# Patient Record
Sex: FEMALE | Race: WHITE | NOT HISPANIC OR LATINO | Employment: FULL TIME | URBAN - NONMETROPOLITAN AREA
[De-identification: names, ages, dates, MRNs, and addresses within clinical notes are randomized per-mention and may not be internally consistent; named-entity substitution may affect disease eponyms.]

---

## 2017-02-13 ENCOUNTER — OFFICE VISIT (OUTPATIENT)
Dept: CARDIAC SURGERY | Facility: CLINIC | Age: 52
End: 2017-02-13

## 2017-02-13 VITALS
WEIGHT: 179.8 LBS | HEART RATE: 61 BPM | TEMPERATURE: 98.2 F | SYSTOLIC BLOOD PRESSURE: 160 MMHG | HEIGHT: 63 IN | DIASTOLIC BLOOD PRESSURE: 114 MMHG | BODY MASS INDEX: 31.86 KG/M2 | OXYGEN SATURATION: 100 %

## 2017-02-13 DIAGNOSIS — Z85.038 H/O COLON CANCER, STAGE II: ICD-10-CM

## 2017-02-13 DIAGNOSIS — I10 ESSENTIAL HYPERTENSION: ICD-10-CM

## 2017-02-13 DIAGNOSIS — F32.4 MAJOR DEPRESSIVE DISORDER WITH SINGLE EPISODE, IN PARTIAL REMISSION (HCC): ICD-10-CM

## 2017-02-13 DIAGNOSIS — R09.89 DECREASED PULSES IN FEET: Primary | ICD-10-CM

## 2017-02-13 DIAGNOSIS — I45.6 WOLFF-PARKINSON-WHITE (WPW) PATTERN: ICD-10-CM

## 2017-02-13 PROCEDURE — 99204 OFFICE O/P NEW MOD 45 MIN: CPT | Performed by: THORACIC SURGERY (CARDIOTHORACIC VASCULAR SURGERY)

## 2017-02-20 NOTE — PROGRESS NOTES
2/13/2017    Pati Vicklps  1965      Chief Complaint:    Chief Complaint   Patient presents with   [facility] F/U     Blue Toes Left Foot       HPI:      PCP:  Garrett Vail MD   Cardiology:  Dr Swenson  Nephrology:  Dr Lopez    51 y.o. female with WPW, HTN, depression, colon CA.  never smoked.  Moderate pain LEFT 5th toe 2 weeks ago, both feet turning blue, cold.  Resolved 2-3 days.  Feet still cold occasionally.  No TIA Stroke, MI, claudication, rest pain, tissue loss.  No other associated signs, symptoms or modifying factors.    2007 WPW ablation, ([facility])  2/13/2017 PAULINE:  RIGHT 1.0 triphasic.  LEFT 1.0 triphasic.    The following portions of the patient's history were reviewed and updated as appropriate: allergies, current medications, past family history, past medical history, past social history, past surgical history and problem list.  Recent images independently reviewed.  Available laboratory values reviewed.    PMH:  Past Medical History   Diagnosis Date   • Abdominal pain      lower, hypo gastrium, s/p surgery      • Anomalous atrioventricular excitation    • C. difficile colitis      Hx   • Cellulitis      right lower buttock      • Conduction disorder of the heart       Supraventricular      • Cough    • Depression    • Encounter for screening for malignant neoplasm of colon    • Essential hypertension    • Essential hypertension    • Family history of malignant neoplasm of digestive organ    • Fever    • Furuncle of buttock    • History of colon polyps    • History of malignant neoplasm of colon    • History of malignant neoplasm of gastrointestinal tract    • Hyperaldosteronism    • Hyperplastic polyp of large intestine    • Hypertensive disorder    • Hypokalemia    • Influenza    • Intramural leiomyoma of uterus    • 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      Folate and B12 ordered      • Menstruation disorder      abnormal bleeding from female genital tract      • MRSA infection      Hx    • Nausea    • Screening for malignant neoplasm of breast    • URI (upper respiratory infection)    • Visit for gynecologic examination    • Vitamin D deficiency    • Vitamin deficiency    • Peter-Parkinson-White (WPW) pattern        ALLERGIES:  Allergies   Allergen Reactions   • Tums [Calcium Carbonate Antacid] Anaphylaxis   • Sulfa Antibiotics Rash         MEDICATIONS:    Current Outpatient Prescriptions:   •  amitriptyline (ELAVIL) 50 MG tablet, Take 1 tablet by mouth every night., Disp: 30 tablet, Rfl: 6  •  amLODIPine (NORVASC) 10 MG tablet, Take 1 tablet by mouth daily., Disp: 30 tablet, Rfl: 6  •  aspirin 81 MG tablet, Take 1 tablet by mouth Daily., Disp: 30 tablet, Rfl: 2  •  lisinopril (PRINIVIL,ZESTRIL) 40 MG tablet, Take 1 tablet by mouth daily., Disp: 30 tablet, Rfl: 5  •  metoprolol tartrate (LOPRESSOR) 50 MG tablet, Take 1 tablet by mouth 2 (two) times a day., Disp: 60 tablet, Rfl: 6  •  potassium chloride (K-DUR,KLOR-CON) 10 MEQ CR tablet, Take 1 tablet by mouth 2 (two) times a day. (Patient taking differently: Take 20 mEq by mouth 2 (Two) Times a Day.), Disp: 60 tablet, Rfl: 5  •  spironolactone (ALDACTONE) 25 MG tablet, Take 1 tablet by mouth daily., Disp: 30 tablet, Rfl: 6    Review of Systems   Review of Systems   Constitution: Negative for malaise/fatigue, night sweats and weight loss.   HENT: Negative for hearing loss, hoarse voice and stridor.    Eyes: Negative for vision loss in left eye, vision loss in right eye and visual disturbance.   Cardiovascular: Positive for leg swelling. Negative for chest pain and palpitations.   Respiratory: Negative for cough, hemoptysis and shortness of breath.    Hematologic/Lymphatic: Negative for adenopathy and bleeding problem. Does not bruise/bleed easily.   Skin: Negative for color change, poor wound healing and rash.   Musculoskeletal: Negative for arthritis, back pain, muscle weakness and neck pain.   Gastrointestinal: Negative for abdominal pain,  dysphagia and heartburn.   Neurological: Negative for dizziness, numbness and seizures.   Psychiatric/Behavioral: Negative for altered mental status, depression and memory loss. The patient is not nervous/anxious.        Physical Exam   Physical Exam   Constitutional: She is oriented to person, place, and time. She is active and cooperative. She does not appear ill. No distress.   HENT:   Head: Atraumatic.   Right Ear: Hearing normal.   Left Ear: Hearing normal.   Nose: No nasal deformity. No epistaxis.   Mouth/Throat: She does not have dentures. Normal dentition.   Eyes: Conjunctivae and lids are normal. Right pupil is round and reactive. Left pupil is round and reactive.   Neck: No JVD present. Carotid bruit is not present. No tracheal deviation present. No thyroid mass and no thyromegaly present.   Cardiovascular: Normal rate and regular rhythm.    No murmur heard.  Pulses:       Carotid pulses are 2+ on the right side, and 2+ on the left side.       Radial pulses are 2+ on the right side, and 2+ on the left side.        Dorsalis pedis pulses are 1+ on the right side, and 1+ on the left side.        Posterior tibial pulses are 1+ on the right side, and 1+ on the left side.   Pulmonary/Chest: Effort normal and breath sounds normal.   Abdominal: Soft. She exhibits no distension and no mass. There is no splenomegaly or hepatomegaly. There is no tenderness.   Musculoskeletal: She exhibits no deformity.   Gait normal.    Lymphadenopathy:     She has no cervical adenopathy.        Right: No supraclavicular adenopathy present.        Left: No supraclavicular adenopathy present.   Neurological: She is alert and oriented to person, place, and time. She has normal strength.   Skin: Skin is warm and dry. No cyanosis or erythema. No pallor.   No venous staining  CR <2sec  Toes Pink, cool to touch   Psychiatric: She has a normal mood and affect. Her speech is normal. Judgment and thought content normal.   Results for KELLY,  PATI ALVARADO (MRN [id number]) as of 2/20/2017 16:05   Ref. Range 12/12/2016 14:03   Creatinine Latest Ref Range: 0.5 - 1.0 mg/dl 0.8   BUN Latest Ref Range: 7 - 21 mg/dl 9       ASSESSMENT:  Pati was seen today for care center f/u.    Diagnoses and all orders for this visit:    Decreased pulses in feet  -     Doppler Ankle Brachial Index Single Level CAR    Peter-Parkinson-White (WPW) pattern    Essential hypertension    Major depressive disorder with single episode, in partial remission    H/O colon cancer, stage II    PLAN:  Detailed discussion with Ms Sotelo regarding situation and options.  Normal perfusion to both feet.  Does not appear to be embolic or vascular etiology to symptoms.  Multiple risk factors with severe comorbidities.  No intervention or additional vascular imaging indicated at this time.   Risks, benefits discussed.  Understands and wishes to proceed with plan.    Return as needed.  Continue Cardiology follow up    Recommended regular physical activity, progressive walking program.  Continue current medications as directed. Aspirin.  Will Obtain relevant old records.    Thank you for the opportunity to participate in this patient's care.    Copy to primary care provider.    EMR Dragon/Transcription disclaimer:   Much of this encounter note is an electronic transcription/translation of spoken language to printed text. The electronic translation of spoken language may permit erroneous, or at times, nonsensical words or phrases to be inadvertently transcribed; Although I have reviewed the note for such errors, some may still exist.

## 2017-02-21 LAB
BH CV LOWER ARTERIAL LEFT ABI RATIO: 1.04
BH CV LOWER ARTERIAL LEFT DORSALIS PEDIS SYS MAX: 175 MMHG
BH CV LOWER ARTERIAL LEFT POST TIBIAL SYS MAX: 177 MMHG
BH CV LOWER ARTERIAL RIGHT ABI RATIO: 1.03
BH CV LOWER ARTERIAL RIGHT DORSALIS PEDIS SYS MAX: 176 MMHG
BH CV LOWER ARTERIAL RIGHT POST TIBIAL SYS MAX: 171 MMHG
UPPER ARTERIAL LEFT ARM BRACHIAL SYS MAX: 171 MMHG
UPPER ARTERIAL RIGHT ARM BRACHIAL SYS MAX: 162 MMHG

## 2017-03-08 ENCOUNTER — APPOINTMENT (OUTPATIENT)
Dept: LAB | Facility: HOSPITAL | Age: 52
End: 2017-03-08

## 2017-03-08 ENCOUNTER — OFFICE VISIT (OUTPATIENT)
Dept: FAMILY MEDICINE CLINIC | Facility: CLINIC | Age: 52
End: 2017-03-08

## 2017-03-08 VITALS
DIASTOLIC BLOOD PRESSURE: 80 MMHG | SYSTOLIC BLOOD PRESSURE: 122 MMHG | OXYGEN SATURATION: 99 % | HEART RATE: 70 BPM | TEMPERATURE: 97.8 F | HEIGHT: 63 IN | WEIGHT: 180.1 LBS | BODY MASS INDEX: 31.91 KG/M2

## 2017-03-08 DIAGNOSIS — Z11.59 NEED FOR HEPATITIS C SCREENING TEST: ICD-10-CM

## 2017-03-08 DIAGNOSIS — I10 ESSENTIAL HYPERTENSION: Primary | ICD-10-CM

## 2017-03-08 DIAGNOSIS — Z76.89 ENCOUNTER TO ESTABLISH CARE: ICD-10-CM

## 2017-03-08 DIAGNOSIS — Z00.00 PREVENTATIVE HEALTH CARE: ICD-10-CM

## 2017-03-08 LAB
ANION GAP SERPL CALCULATED.3IONS-SCNC: 11 MMOL/L (ref 5–15)
ARTICHOKE IGE QN: 126 MG/DL (ref 1–129)
BUN BLD-MCNC: 12 MG/DL (ref 7–21)
BUN/CREAT SERPL: 15 (ref 7–25)
CALCIUM SPEC-SCNC: 9.6 MG/DL (ref 8.4–10.2)
CHLORIDE SERPL-SCNC: 103 MMOL/L (ref 95–110)
CHOLEST SERPL-MCNC: 207 MG/DL (ref 0–199)
CO2 SERPL-SCNC: 24 MMOL/L (ref 22–31)
CREAT BLD-MCNC: 0.8 MG/DL (ref 0.5–1)
GFR SERPL CREATININE-BSD FRML MDRD: 75 ML/MIN/1.73 (ref 51–120)
GLUCOSE BLD-MCNC: 97 MG/DL (ref 60–100)
HDLC SERPL-MCNC: 48 MG/DL (ref 60–200)
LDLC/HDLC SERPL: 2.71 {RATIO} (ref 0–3.22)
POTASSIUM BLD-SCNC: 4 MMOL/L (ref 3.5–5.1)
SODIUM BLD-SCNC: 138 MMOL/L (ref 137–145)
TRIGL SERPL-MCNC: 144 MG/DL (ref 20–199)

## 2017-03-08 PROCEDURE — 36415 COLL VENOUS BLD VENIPUNCTURE: CPT | Performed by: FAMILY MEDICINE

## 2017-03-08 PROCEDURE — 86803 HEPATITIS C AB TEST: CPT | Performed by: FAMILY MEDICINE

## 2017-03-08 PROCEDURE — 99213 OFFICE O/P EST LOW 20 MIN: CPT | Performed by: FAMILY MEDICINE

## 2017-03-08 PROCEDURE — 80048 BASIC METABOLIC PNL TOTAL CA: CPT | Performed by: FAMILY MEDICINE

## 2017-03-08 PROCEDURE — 80061 LIPID PANEL: CPT | Performed by: FAMILY MEDICINE

## 2017-03-08 NOTE — PROGRESS NOTES
Subjective   Chief Complaint   Patient presents with   • Establish Care       Pati Sotelo is a 51 y.o. female who presents for Establish Care     New to [facility] info:  Previous PCP: Genna  Patient Care Team:  Jennifer Hayward DO as PCP - General (Family Medicine)  Adriano Doyle MD as Surgeon (Cardiothoracic Surgery)  Jeovany Downing MD as Consulting Physician (Hematology and Oncology)  Jorge A Sheehan MD as Consulting Physician (Gastroenterology)  John Saha MD as Consulting Physician (Nephrology)  Hemanth Sin MD as Obstetrician (Obstetrics and Gynecology)   History of Present Illness  She has a hx of colon cancer s/p resection and chemo in 2009 (follow up with oncology yearly) and WPW s/p ablation, HTN, Depression. She is here to establish. She is doing well with no concerns or complaints. Blood pressure is doing well. She follow ups with Dr. Doyle for WPW. She is doing well in that regard. No chest pain, SOB, palpitations. She also recently had an PAULINE done that was normal. She has some mild purpura on bilateral lower legs (just proximal to ankles) that she states has been there for a few years and she has seen several doctors for it and told it was nothing to worry about.    The following portions of the patient's history were reviewed and updated as appropriate:    Past Medical History   Diagnosis Date   • Abdominal pain      lower, hypo gastrium, s/p surgery      • Anomalous atrioventricular excitation    • C. 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 The examination was otherwise normal. No specimens collected   • Ecg converted  09/29/2008     Sinus rhythm with short IL. Minimal voltage criteria for LVH, may be normal variant T wave abnormality, consider inferolateral ischemia. Prolonged QT Abnormal ECG   • Cardiac ablation     • Tubal abdominal ligation     • Hernia repair         Family History   Problem Relation Age of Onset   • Depression Other    • Hypertension Other    • Cancer Father      colon   • Cancer Paternal Grandfather        Social History     Social History   • Marital status:      Spouse name: N/A   • Number of children: N/A   • Years of education: N/A     Occupational History   • Not on file.     Social History Main Topics   • Smoking status: Never Smoker   • Smokeless tobacco: Never Used   • Alcohol use No   • Drug use: No   • Sexual activity: Not on file     Other Topics Concern   • Not on file     Social History Narrative   • No narrative on file       Medications:    Current Outpatient Prescriptions:   •  amitriptyline (ELAVIL) 50 MG tablet, Take 1 tablet by mouth every night., Disp: 30 tablet, Rfl: 6  •  amLODIPine (NORVASC) 10 MG tablet, Take 1 tablet by mouth daily., Disp: 30 tablet, Rfl: 6  •  aspirin 81 MG tablet,  "Take 1 tablet by mouth Daily., Disp: 30 tablet, Rfl: 2  •  lisinopril (PRINIVIL,ZESTRIL) 40 MG tablet, Take 1 tablet by mouth daily., Disp: 30 tablet, Rfl: 5  •  metoprolol tartrate (LOPRESSOR) 50 MG tablet, Take 1 tablet by mouth 2 (two) times a day., Disp: 60 tablet, Rfl: 6  •  potassium chloride (K-DUR,KLOR-CON) 10 MEQ CR tablet, Take 1 tablet by mouth 2 (two) times a day. (Patient taking differently: Take 20 mEq by mouth 2 (Two) Times a Day.), Disp: 60 tablet, Rfl: 5  •  spironolactone (ALDACTONE) 25 MG tablet, Take 1 tablet by mouth daily., Disp: 30 tablet, Rfl: 6    Allergies   Allergen Reactions   • Tums [Calcium Carbonate Antacid] Anaphylaxis   • Sulfa Antibiotics Rash       Review of Systems   Constitutional: Negative for fever and unexpected weight change.   Respiratory: Negative for shortness of breath.    Cardiovascular: Negative for chest pain and leg swelling.   Gastrointestinal: Negative for abdominal pain, blood in stool, constipation, diarrhea and vomiting.   Psychiatric/Behavioral: Negative for dysphoric mood.       Objective   Visit Vitals   • /80   • Pulse 70   • Temp 97.8 °F (36.6 °C)   • Ht 63\" (160 cm)   • Wt 180 lb 1.6 oz (81.7 kg)   • SpO2 99%   • BMI 31.9 kg/m2       Physical Exam   Constitutional: She appears well-developed and well-nourished. No distress.   Cardiovascular: Normal rate, regular rhythm and normal heart sounds.  Exam reveals no gallop and no friction rub.    No murmur heard.  Pulmonary/Chest: Effort normal and breath sounds normal. She has no wheezes. She has no rales.   Musculoskeletal: She exhibits no edema.   Neurological: She is alert.   Skin:   Mild purpura bilateral lower extremities just proximal to ankles   Psychiatric: She has a normal mood and affect. Her behavior is normal.   Nursing note and vitals reviewed.      Assessment/Plan   Pati Sotelo is a 51 y.o. female seen today for the followin. Essential hypertension  Well controlled. Purpura " possibly due to ?ASA use but is stable and has been present for years. Recent CBC was normal    2. Encounter to establish care  History/records reviewed    3. Need for hepatitis C screening test    - Hepatitis C Antibody    4. Preventative health care  She will discuss pap smear with gynecology. Last one was in 2014.     - Lipid Panel  - Basic Metabolic Panel    Follow up: Return in about 6 months (around 9/8/2017).          This document has been electronically signed by Jennifer Hayward DO on March 8, 2017 10:43 AM

## 2017-03-10 LAB — HCV AB SER DONR QL: NEGATIVE

## 2017-03-12 RX ORDER — POTASSIUM CHLORIDE 750 MG/1
TABLET, EXTENDED RELEASE ORAL
Qty: 60 TABLET | Refills: 0 | Status: CANCELLED | OUTPATIENT
Start: 2017-03-12

## 2017-03-15 ENCOUNTER — TELEPHONE (OUTPATIENT)
Dept: FAMILY MEDICINE CLINIC | Facility: CLINIC | Age: 52
End: 2017-03-15

## 2017-03-15 RX ORDER — AMITRIPTYLINE HYDROCHLORIDE 50 MG/1
50 TABLET, FILM COATED ORAL NIGHTLY
Qty: 30 TABLET | Refills: 5 | Status: SHIPPED | OUTPATIENT
Start: 2017-03-15 | End: 2017-09-29 | Stop reason: SDUPTHER

## 2017-03-15 RX ORDER — SPIRONOLACTONE 25 MG/1
25 TABLET ORAL DAILY
Qty: 30 TABLET | Refills: 5 | Status: SHIPPED | OUTPATIENT
Start: 2017-03-15 | End: 2017-09-29 | Stop reason: SDUPTHER

## 2017-03-15 RX ORDER — METOPROLOL TARTRATE 50 MG/1
50 TABLET, FILM COATED ORAL 2 TIMES DAILY
Qty: 60 TABLET | Refills: 5 | Status: SHIPPED | OUTPATIENT
Start: 2017-03-15 | End: 2017-09-29 | Stop reason: SDUPTHER

## 2017-03-15 RX ORDER — POTASSIUM CHLORIDE 750 MG/1
20 TABLET, EXTENDED RELEASE ORAL 2 TIMES DAILY
Qty: 60 TABLET | Refills: 5 | Status: SHIPPED | OUTPATIENT
Start: 2017-03-15 | End: 2017-09-09 | Stop reason: SDUPTHER

## 2017-03-15 RX ORDER — AMLODIPINE BESYLATE 10 MG/1
10 TABLET ORAL DAILY
Qty: 30 TABLET | Refills: 5 | Status: SHIPPED | OUTPATIENT
Start: 2017-03-15 | End: 2017-09-29 | Stop reason: SDUPTHER

## 2017-03-15 RX ORDER — LISINOPRIL 40 MG/1
40 TABLET ORAL DAILY
Qty: 30 TABLET | Refills: 5 | Status: SHIPPED | OUTPATIENT
Start: 2017-03-15 | End: 2017-09-29 | Stop reason: SDUPTHER

## 2017-03-15 NOTE — TELEPHONE ENCOUNTER
I sent these in and tried to call her to let her know, the pharmacy will call her when they are ready.

## 2017-03-15 NOTE — TELEPHONE ENCOUNTER
----- Message from Leslie Ge sent at 3/15/2017 10:57 AM CDT -----  Regarding: Med refill  Contact: [phone]  Patient needs a refill on potassium (klor-con), amitripiyline 50 mg, amlodipine 10 mg, linsiopirl 40 mg, metropolol 50 mg, and spironolactone 25 mg at [facility]

## 2017-06-26 ENCOUNTER — LAB (OUTPATIENT)
Dept: LAB | Facility: HOSPITAL | Age: 52
End: 2017-06-26

## 2017-06-26 ENCOUNTER — TRANSCRIBE ORDERS (OUTPATIENT)
Dept: LAB | Facility: HOSPITAL | Age: 52
End: 2017-06-26

## 2017-06-26 DIAGNOSIS — N18.30 CHRONIC KIDNEY DISEASE, STAGE III (MODERATE) (HCC): ICD-10-CM

## 2017-06-26 DIAGNOSIS — N18.30 CHRONIC KIDNEY DISEASE, STAGE III (MODERATE) (HCC): Primary | ICD-10-CM

## 2017-06-26 LAB
ALBUMIN SERPL-MCNC: 4.1 G/DL (ref 3.4–4.8)
ANION GAP SERPL CALCULATED.3IONS-SCNC: 11 MMOL/L (ref 5–15)
BUN BLD-MCNC: 10 MG/DL (ref 7–21)
BUN/CREAT SERPL: 12.3 (ref 7–25)
CALCIUM SPEC-SCNC: 9.3 MG/DL (ref 8.4–10.2)
CHLORIDE SERPL-SCNC: 102 MMOL/L (ref 95–110)
CO2 SERPL-SCNC: 27 MMOL/L (ref 22–31)
CREAT BLD-MCNC: 0.81 MG/DL (ref 0.5–1)
CREAT UR-MCNC: 57.2 MG/DL
GFR SERPL CREATININE-BSD FRML MDRD: 74 ML/MIN/1.73
GLUCOSE BLD-MCNC: 96 MG/DL (ref 60–100)
PHOSPHATE SERPL-MCNC: 3.4 MG/DL (ref 2.4–4.4)
POTASSIUM BLD-SCNC: 3.8 MMOL/L (ref 3.5–5.1)
PROT UR-MCNC: 10.9 MG/DL
PROT/CREAT UR: 190.6 MG/G CREA (ref 0–200)
SODIUM BLD-SCNC: 140 MMOL/L (ref 137–145)

## 2017-06-26 PROCEDURE — 36415 COLL VENOUS BLD VENIPUNCTURE: CPT

## 2017-06-26 PROCEDURE — 80069 RENAL FUNCTION PANEL: CPT | Performed by: INTERNAL MEDICINE

## 2017-06-26 PROCEDURE — 84156 ASSAY OF PROTEIN URINE: CPT | Performed by: INTERNAL MEDICINE

## 2017-06-26 PROCEDURE — 82570 ASSAY OF URINE CREATININE: CPT | Performed by: INTERNAL MEDICINE

## 2017-08-11 ENCOUNTER — TRANSCRIBE ORDERS (OUTPATIENT)
Dept: LAB | Facility: HOSPITAL | Age: 52
End: 2017-08-11

## 2017-08-11 DIAGNOSIS — N18.30 CHRONIC RENAL DISEASE, STAGE III (HCC): Primary | ICD-10-CM

## 2017-09-11 RX ORDER — POTASSIUM CHLORIDE 750 MG/1
TABLET, EXTENDED RELEASE ORAL
Qty: 60 TABLET | Refills: 0 | Status: SHIPPED | OUTPATIENT
Start: 2017-09-11 | End: 2017-09-29

## 2017-09-29 ENCOUNTER — OFFICE VISIT (OUTPATIENT)
Dept: FAMILY MEDICINE CLINIC | Facility: CLINIC | Age: 52
End: 2017-09-29

## 2017-09-29 VITALS
WEIGHT: 175.2 LBS | DIASTOLIC BLOOD PRESSURE: 96 MMHG | HEIGHT: 63 IN | OXYGEN SATURATION: 96 % | SYSTOLIC BLOOD PRESSURE: 138 MMHG | BODY MASS INDEX: 31.04 KG/M2 | HEART RATE: 68 BPM

## 2017-09-29 DIAGNOSIS — Z12.31 ENCOUNTER FOR SCREENING MAMMOGRAM FOR BREAST CANCER: ICD-10-CM

## 2017-09-29 DIAGNOSIS — I10 ESSENTIAL HYPERTENSION: Primary | ICD-10-CM

## 2017-09-29 DIAGNOSIS — Z23 NEED FOR INFLUENZA VACCINATION: ICD-10-CM

## 2017-09-29 PROCEDURE — 90686 IIV4 VACC NO PRSV 0.5 ML IM: CPT | Performed by: FAMILY MEDICINE

## 2017-09-29 PROCEDURE — 90471 IMMUNIZATION ADMIN: CPT | Performed by: FAMILY MEDICINE

## 2017-09-29 PROCEDURE — 99213 OFFICE O/P EST LOW 20 MIN: CPT | Performed by: FAMILY MEDICINE

## 2017-09-29 RX ORDER — METOPROLOL TARTRATE 50 MG/1
50 TABLET, FILM COATED ORAL 2 TIMES DAILY
Qty: 60 TABLET | Refills: 5 | Status: SHIPPED | OUTPATIENT
Start: 2017-09-29 | End: 2018-03-29 | Stop reason: SDUPTHER

## 2017-09-29 RX ORDER — POTASSIUM CHLORIDE 20 MEQ/1
20 TABLET, EXTENDED RELEASE ORAL 2 TIMES DAILY
Qty: 60 TABLET | Refills: 5 | Status: SHIPPED | OUTPATIENT
Start: 2017-09-29 | End: 2018-03-29 | Stop reason: SDUPTHER

## 2017-09-29 RX ORDER — SPIRONOLACTONE 25 MG/1
25 TABLET ORAL DAILY
Qty: 30 TABLET | Refills: 5 | Status: SHIPPED | OUTPATIENT
Start: 2017-09-29 | End: 2018-03-29 | Stop reason: SDUPTHER

## 2017-09-29 RX ORDER — AMLODIPINE BESYLATE 10 MG/1
10 TABLET ORAL DAILY
Qty: 30 TABLET | Refills: 5 | Status: SHIPPED | OUTPATIENT
Start: 2017-09-29 | End: 2018-03-29 | Stop reason: SDUPTHER

## 2017-09-29 RX ORDER — AMITRIPTYLINE HYDROCHLORIDE 50 MG/1
50 TABLET, FILM COATED ORAL NIGHTLY
Qty: 30 TABLET | Refills: 5 | Status: SHIPPED | OUTPATIENT
Start: 2017-09-29 | End: 2018-03-29 | Stop reason: SDUPTHER

## 2017-09-29 RX ORDER — LISINOPRIL 40 MG/1
40 TABLET ORAL DAILY
Qty: 30 TABLET | Refills: 5 | Status: SHIPPED | OUTPATIENT
Start: 2017-09-29 | End: 2018-03-29 | Stop reason: SDUPTHER

## 2017-09-29 NOTE — PROGRESS NOTES
Subjective   Chief Complaint   Patient presents with   • Follow-up     6 mo follow up       Pati Sotelo is a 52 y.o. female who presents for Follow-up (6 mo follow up)   Hypertension   This is a chronic problem. The problem is controlled. Pertinent negatives include no chest pain, headaches, malaise/fatigue, palpitations, peripheral edema or shortness of breath. There are no associated agents to hypertension. There are no compliance problems.        The following portions of the patient's history were reviewed and updated as appropriate:problem list, current medications, allergies, past family history, past medical history, past social history and past surgical history    Past Medical History:   Diagnosis Date   • Abdominal pain     lower, hypo gastrium, s/p surgery      • Anomalous atrioventricular excitation    • C. 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 30 tablet 2   • amitriptyline (ELAVIL) 50 MG tablet Take 1 tablet by mouth Every Night. 30 tablet 5   • amLODIPine (NORVASC) 10 MG tablet Take 1 tablet by mouth Daily. 30 tablet 5   • KLOR-CON 10 MEQ CR tablet TAKE TWO TABLETS BY MOUTH TWICE DAILY 60 tablet 0   • lisinopril (PRINIVIL,ZESTRIL) 40 MG tablet Take 1 tablet by mouth Daily. 30 tablet 5   • metoprolol tartrate (LOPRESSOR) 50 MG tablet Take 1 tablet by mouth 2 (Two) Times a Day. 60 tablet 5   • spironolactone (ALDACTONE) 25 MG tablet Take 1 tablet by mouth Daily. 30 tablet 5     No facility-administered medications prior to visit.        Allergies   Allergen Reactions   • Tums [Calcium Carbonate Antacid] Anaphylaxis   • Sulfa Antibiotics Rash       Review of Systems   Constitutional: Negative for fatigue, fever, malaise/fatigue and unexpected weight change.   HENT: Negative.    Eyes: Negative.    Respiratory: Negative for shortness of breath.    Cardiovascular: Negative for chest pain, palpitations and leg swelling.   Gastrointestinal: Negative for abdominal pain, constipation, diarrhea, nausea and vomiting.   Endocrine: Negative.    Genitourinary: Negative.    Musculoskeletal: Negative.    Skin: Negative.    Neurological: Negative.  Negative for headaches.   Psychiatric/Behavioral: Negative for dysphoric mood and sleep disturbance.       Objective   Visit Vitals   • /96 (BP Location: Left arm, Patient Position: Sitting, Cuff Size: Large Adult)   • Pulse 68   • Ht 63\" (160 cm)   • Wt 175 lb 3.2 oz (79.5 kg)   • LMP 09/08/2017   • SpO2 96%   • BMI 31.04 kg/m2       Physical Exam   Constitutional: She is oriented to person, place, and time. She appears well-developed and well-nourished. No distress.   HENT:   Head: Normocephalic.   Nose: Nose normal.   Eyes: Conjunctivae and EOM are normal. Right eye exhibits no discharge. Left eye exhibits no discharge.   Neck: " Normal range of motion.   Cardiovascular: Normal rate, regular rhythm and normal heart sounds.  Exam reveals no gallop and no friction rub.    No murmur heard.  Pulmonary/Chest: Effort normal and breath sounds normal. She has no wheezes. She has no rales.   Musculoskeletal: She exhibits no edema.   Neurological: She is alert and oriented to person, place, and time. No cranial nerve deficit.   Skin: She is not diaphoretic.   Psychiatric: She has a normal mood and affect. Her behavior is normal.   Nursing note and vitals reviewed.      Assessment/Plan   Pati Sotelo is a 52 y.o. female seen today for the followin. Essential hypertension  meds refilled     - lisinopril (PRINIVIL,ZESTRIL) 40 MG tablet; Take 1 tablet by mouth Daily.  Dispense: 30 tablet; Refill: 5  - metoprolol tartrate (LOPRESSOR) 50 MG tablet; Take 1 tablet by mouth 2 (Two) Times a Day.  Dispense: 60 tablet; Refill: 5  - amLODIPine (NORVASC) 10 MG tablet; Take 1 tablet by mouth Daily.  Dispense: 30 tablet; Refill: 5  - spironolactone (ALDACTONE) 25 MG tablet; Take 1 tablet by mouth Daily.  Dispense: 30 tablet; Refill: 5    2. Need for influenza vaccination    - Flu Vaccine Intradermal Quad 18-64YR    3. Encounter for screening mammogram for breast cancer    - Mammo Screening Digital Tomosynthesis Bilateral With CAD    Follow up: Return in about 6 weeks (around 11/10/2017) for Well Woman Exam with mammogram same day.          This document has been electronically signed by Jennifer Hayward DO on 2017 8:48 AM

## 2017-11-10 ENCOUNTER — PROCEDURE VISIT (OUTPATIENT)
Dept: FAMILY MEDICINE CLINIC | Facility: CLINIC | Age: 52
End: 2017-11-10

## 2017-11-10 VITALS
HEART RATE: 60 BPM | BODY MASS INDEX: 31.11 KG/M2 | SYSTOLIC BLOOD PRESSURE: 132 MMHG | DIASTOLIC BLOOD PRESSURE: 76 MMHG | WEIGHT: 175.6 LBS | HEIGHT: 63 IN | OXYGEN SATURATION: 98 %

## 2017-11-10 DIAGNOSIS — Z12.4 SCREENING FOR CERVICAL CANCER: ICD-10-CM

## 2017-11-10 DIAGNOSIS — Z01.419 WELL WOMAN EXAM WITH ROUTINE GYNECOLOGICAL EXAM: Primary | ICD-10-CM

## 2017-11-10 PROCEDURE — 99396 PREV VISIT EST AGE 40-64: CPT | Performed by: FAMILY MEDICINE

## 2017-11-10 PROCEDURE — 88142 CYTOPATH C/V THIN LAYER: CPT | Performed by: FAMILY MEDICINE

## 2017-11-10 PROCEDURE — 88141 CYTOPATH C/V INTERPRET: CPT | Performed by: PATHOLOGY

## 2017-11-10 NOTE — PROGRESS NOTES
Subjective   Chief Complaint   Patient presents with   • Annual Exam     well woman visit       History of Present Illness   Ptai Sotelo is a 52 y.o. year old presenting to be seen for her annual well woman exam.      Concerns: has no other new concerns    Menstrual History:  No LMP recorded. does not remember last period  Menses: no (postmenopausal)  She denies dysmenorrhea, bloating and heavy bleeding    Gynecologic History:  She is sexually active. In the past 12 months there has not been new sexual partners.  In the past 12 months there have been 1 sexual partners. Condoms are not typically used.  She would not like to be screened for STD's at today's exam.   She is using BTL for contraception.    Hormone use: no.  She denies abnormal vaginal bleeding, vaginal discharge, vaginal itching, vaginal odor, pain with intercourse, vaginal dryness, pelvic pain, hot flashes, breast mass/lump, breast pain/tenderness, nipple discharge, excessive bloating and urinary leakage/incontinence.  Hx of abnormal pap: no.    Preventative:   She exercises regularly: no.  Healthy Diet:yes.  Tobacco use: no..  She has concerns about domestic violence: no.  Last colonoscopy or FIT test: 2016  Last PAP: unsure   Last Mammo: 2015     Family History:  There is family history of colon cancer and cervical cancer.    The following portions of the patient's history were reviewed and updated as appropriate:    Past Medical History:  Past Medical History:   Diagnosis Date   • Abdominal pain     lower, hypo gastrium, s/p surgery      • Anomalous atrioventricular excitation    • C. difficile colitis     Hx   • Conduction disorder of the heart      Supraventricular      • Depression    • Encounter for screening for malignant neoplasm of colon    • Essential hypertension    • Family history of malignant neoplasm of digestive organ    • History of colon polyps    • History of malignant neoplasm of colon     s/p resection in 2009 and chemo   •  History of malignant neoplasm of gastrointestinal tract    • Hyperaldosteronism    • Hyperplastic polyp of large intestine    • Hypertensive disorder    • Hypokalemia    • Intramural leiomyoma of uterus    • 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     Folate and B12 ordered      • Menstruation disorder     abnormal bleeding from female genital tract      • MRSA infection     Hx   • Screening for malignant neoplasm of breast    • Vitamin D deficiency    • Peter-Parkinson-White (WPW) pattern        Past Surgical History:  Past Surgical History:   Procedure Laterality Date   • CARDIAC ABLATION     • CARDIAC CATHETERIZATION  07/02/2007    No significant coronary artery disease with normal left ventricular function   • COLECTOMY PARTIAL / TOTAL  12/23/2009    Mid sigmoid obstructing colon cancer   • ECG CONVERTED  09/29/2008    Sinus rhythm with short ND. Minimal voltage criteria for LVH, may be normal variant T wave abnormality, consider inferolateral ischemia. Prolonged QT Abnormal ECG   • ENDOSCOPY AND COLONOSCOPY  06/27/2016    External and internal hemorrhoids. The examination was otherwise normal. No specimens collected   • HERNIA REPAIR     • TUBAL ABDOMINAL LIGATION         Family History:  Family History   Problem Relation Age of Onset   • Depression Other    • Hypertension Other    • Cancer Father      colon   • Cancer Paternal Grandfather        Social History:  Social History     Social History   • Marital status:      Spouse name: N/A   • Number of children: N/A   • Years of education: N/A     Occupational History   • Not on file.     Social History Main Topics   • Smoking status: Never Smoker   • Smokeless tobacco: Never Used   • Alcohol use No   • Drug use: No   • Sexual activity: Not on file     Other Topics Concern   • Not on file     Social History Narrative     History   Sexual Activity   • Sexual activity: Not on file       Medications:  Outpatient Medications Prior to Visit   Medication Sig Dispense  "Refill   • amitriptyline (ELAVIL) 50 MG tablet Take 1 tablet by mouth Every Night. 30 tablet 5   • amLODIPine (NORVASC) 10 MG tablet Take 1 tablet by mouth Daily. 30 tablet 5   • aspirin 81 MG tablet Take 1 tablet by mouth Daily. 30 tablet 2   • lisinopril (PRINIVIL,ZESTRIL) 40 MG tablet Take 1 tablet by mouth Daily. 30 tablet 5   • metoprolol tartrate (LOPRESSOR) 50 MG tablet Take 1 tablet by mouth 2 (Two) Times a Day. 60 tablet 5   • potassium chloride (KLOR-CON) 20 MEQ CR tablet Take 1 tablet by mouth 2 (Two) Times a Day. 60 tablet 5   • spironolactone (ALDACTONE) 25 MG tablet Take 1 tablet by mouth Daily. 30 tablet 5     No facility-administered medications prior to visit.        Allergies:  Allergies   Allergen Reactions   • Tums [Calcium Carbonate Antacid] Anaphylaxis   • Sulfa Antibiotics Rash       Review of Systems   Constitutional: Negative for fatigue, fever and unexpected weight change.   Gastrointestinal: Negative for abdominal distention, abdominal pain, constipation and diarrhea.   Endocrine: Negative for cold intolerance and heat intolerance.   Genitourinary: Negative for dyspareunia, dysuria, frequency, menstrual problem, pelvic pain, vaginal bleeding, vaginal discharge and vaginal pain.   Psychiatric/Behavioral: Negative for dysphoric mood.   Breast ROS: negative for breast lumps, negative for breast pain/tenderness and negative for nipple discharge     Objective   Visit Vitals   • /76 (BP Location: Left arm, Patient Position: Sitting, Cuff Size: Large Adult)   • Pulse 60   • Ht 63\" (160 cm)   • Wt 175 lb 9.6 oz (79.7 kg)   • SpO2 98%   • BMI 31.11 kg/m2       Physical Exam   Constitutional: She appears well-developed and well-nourished. No distress.   HENT:   Head: Normocephalic.   Pulmonary/Chest: Effort normal. Right breast exhibits no inverted nipple, no mass, no nipple discharge, no skin change and no tenderness. Left breast exhibits no inverted nipple, no mass, no nipple discharge, no " skin change and no tenderness.   Abdominal: Soft. She exhibits no distension and no mass. There is no tenderness. There is no rebound and no guarding. No hernia.   Genitourinary: Vagina normal and uterus normal. No breast tenderness or discharge. There is no rash, tenderness or lesion on the right labia. There is no rash, tenderness or lesion on the left labia. Uterus is not enlarged and not tender. Cervix exhibits friability. Cervix exhibits no motion tenderness and no discharge. Right adnexum displays no mass, no tenderness and no fullness. Left adnexum displays no mass, no tenderness and no fullness. No tenderness or bleeding in the vagina. No vaginal discharge found.   Lymphadenopathy:     She has no axillary adenopathy.   Skin: Skin is warm and dry.   Psychiatric: She has a normal mood and affect. Her behavior is normal.   Nursing note and vitals reviewed.      Assessment/Plan   Pati Sotelo is a 52 y.o. year old No obstetric history on file. seen today for her annual well woman exam:    1. Well woman exam with routine gynecological exam  mammo and pap smear today    - Liquid-based Pap Smear, Screening - ThinPrep Vial, Cervix    2. Screening for cervical cancer    - Liquid-based Pap Smear, Screening - ThinPrep Vial, Cervix           This document has been electronically signed by Jennifer Hayward DO on November 10, 2017 8:59 AM

## 2017-11-17 LAB
LAB AP CASE REPORT: NORMAL
LAB AP GYN ADDITIONAL INFORMATION: NORMAL
LAB AP GYN OTHER FINDINGS: NORMAL
Lab: NORMAL
PATH INTERP SPEC-IMP: NORMAL
STAT OF ADQ CVX/VAG CYTO-IMP: NORMAL

## 2017-11-21 RX ORDER — METRONIDAZOLE 500 MG/1
500 TABLET ORAL 2 TIMES DAILY
Qty: 14 TABLET | Refills: 0 | Status: SHIPPED | OUTPATIENT
Start: 2017-11-21 | End: 2017-11-28

## 2017-11-22 ENCOUNTER — TELEPHONE (OUTPATIENT)
Dept: FAMILY MEDICINE CLINIC | Facility: CLINIC | Age: 52
End: 2017-11-22

## 2017-11-22 NOTE — TELEPHONE ENCOUNTER
Pr Dr. Hayward, MsKerry Sotelo has been called with her recent lab results & recommendations.  Continue her current medications and follow-up as planned or sooner if any problems.      ----- Message from Jennifer Hayward DO sent at 11/21/2017  8:24 AM CST -----  Can you let her know pap smear was negative except did show BV. I sent in rx for flagyl. Should avoid alcohol while taking. Repeat pap smear in 3 years.

## 2017-11-22 NOTE — PROGRESS NOTES
Pr Dr. Hayward, Ms. Deepak has been called with her recent lab results & recommendations.  Continue her current medications and follow-up as planned or sooner if any problems.

## 2017-12-14 DIAGNOSIS — Z12.39 ENCOUNTER FOR SCREENING FOR MALIGNANT NEOPLASM OF BREAST: Primary | ICD-10-CM

## 2017-12-18 ENCOUNTER — LAB (OUTPATIENT)
Dept: ONCOLOGY | Facility: HOSPITAL | Age: 52
End: 2017-12-18

## 2017-12-18 ENCOUNTER — OFFICE VISIT (OUTPATIENT)
Dept: ONCOLOGY | Facility: CLINIC | Age: 52
End: 2017-12-18

## 2017-12-18 VITALS
HEART RATE: 70 BPM | HEIGHT: 63 IN | WEIGHT: 172.18 LBS | TEMPERATURE: 98.7 F | SYSTOLIC BLOOD PRESSURE: 156 MMHG | BODY MASS INDEX: 30.51 KG/M2 | RESPIRATION RATE: 16 BRPM | DIASTOLIC BLOOD PRESSURE: 100 MMHG

## 2017-12-18 DIAGNOSIS — Z85.038 HISTORY OF COLON CANCER: Primary | ICD-10-CM

## 2017-12-18 DIAGNOSIS — Z12.39 ENCOUNTER FOR SCREENING FOR MALIGNANT NEOPLASM OF BREAST: ICD-10-CM

## 2017-12-18 LAB
ALBUMIN SERPL-MCNC: 4.2 G/DL (ref 3.4–4.8)
ALBUMIN/GLOB SERPL: 1.4 G/DL (ref 1.1–1.8)
ALP SERPL-CCNC: 58 U/L (ref 38–126)
ALT SERPL W P-5'-P-CCNC: 31 U/L (ref 9–52)
ANION GAP SERPL CALCULATED.3IONS-SCNC: 12 MMOL/L (ref 5–15)
AST SERPL-CCNC: 23 U/L (ref 14–36)
BASOPHILS # BLD AUTO: 0.03 10*3/MM3 (ref 0–0.2)
BASOPHILS NFR BLD AUTO: 0.5 % (ref 0–2)
BILIRUB SERPL-MCNC: 0.3 MG/DL (ref 0.2–1.3)
BUN BLD-MCNC: 10 MG/DL (ref 7–21)
BUN/CREAT SERPL: 10.8 (ref 7–25)
CALCIUM SPEC-SCNC: 9.5 MG/DL (ref 8.4–10.2)
CHLORIDE SERPL-SCNC: 105 MMOL/L (ref 95–110)
CO2 SERPL-SCNC: 22 MMOL/L (ref 22–31)
CREAT BLD-MCNC: 0.93 MG/DL (ref 0.5–1)
DEPRECATED RDW RBC AUTO: 40.8 FL (ref 36.4–46.3)
EOSINOPHIL # BLD AUTO: 0.1 10*3/MM3 (ref 0–0.7)
EOSINOPHIL NFR BLD AUTO: 1.5 % (ref 0–7)
ERYTHROCYTE [DISTWIDTH] IN BLOOD BY AUTOMATED COUNT: 12.8 % (ref 11.5–14.5)
GFR SERPL CREATININE-BSD FRML MDRD: 63 ML/MIN/1.73 (ref 51–120)
GLOBULIN UR ELPH-MCNC: 3.1 GM/DL (ref 2.3–3.5)
GLUCOSE BLD-MCNC: 91 MG/DL (ref 60–100)
HCT VFR BLD AUTO: 39.1 % (ref 35–45)
HGB BLD-MCNC: 13.7 G/DL (ref 12–15.5)
IMM GRANULOCYTES # BLD: 0.01 10*3/MM3 (ref 0–0.02)
IMM GRANULOCYTES NFR BLD: 0.2 % (ref 0–0.5)
LYMPHOCYTES # BLD AUTO: 1.45 10*3/MM3 (ref 0.6–4.2)
LYMPHOCYTES NFR BLD AUTO: 21.9 % (ref 10–50)
MCH RBC QN AUTO: 30.4 PG (ref 26.5–34)
MCHC RBC AUTO-ENTMCNC: 35 G/DL (ref 31.4–36)
MCV RBC AUTO: 86.7 FL (ref 80–98)
MONOCYTES # BLD AUTO: 0.5 10*3/MM3 (ref 0–0.9)
MONOCYTES NFR BLD AUTO: 7.6 % (ref 0–12)
NEUTROPHILS # BLD AUTO: 4.52 10*3/MM3 (ref 2–8.6)
NEUTROPHILS NFR BLD AUTO: 68.3 % (ref 37–80)
PLATELET # BLD AUTO: 275 10*3/MM3 (ref 150–450)
PMV BLD AUTO: 10.2 FL (ref 8–12)
POTASSIUM BLD-SCNC: 3.9 MMOL/L (ref 3.5–5.1)
PROT SERPL-MCNC: 7.3 G/DL (ref 6.3–8.6)
RBC # BLD AUTO: 4.51 10*6/MM3 (ref 3.77–5.16)
SODIUM BLD-SCNC: 139 MMOL/L (ref 137–145)
WBC NRBC COR # BLD: 6.61 10*3/MM3 (ref 3.2–9.8)

## 2017-12-18 PROCEDURE — 99214 OFFICE O/P EST MOD 30 MIN: CPT | Performed by: NURSE PRACTITIONER

## 2017-12-18 PROCEDURE — 85025 COMPLETE CBC W/AUTO DIFF WBC: CPT

## 2017-12-18 PROCEDURE — 80053 COMPREHEN METABOLIC PANEL: CPT

## 2017-12-18 PROCEDURE — 36415 COLL VENOUS BLD VENIPUNCTURE: CPT | Performed by: NURSE PRACTITIONER

## 2017-12-18 NOTE — PROGRESS NOTES
DATE OF VISIT: 12/18/2017    REASON FOR VISIT:  Yearly  Follow-up    HISTORY OF PRESENT ILLNESS:      52 yr old female diagnosed with Stage II colon cancer in 2009, [id number]. She completed 6 months of Xeloda chemotherapy in the adjuvant setting.SHe is being followed yearly. She denies any changes with her bowels, denies any blood in stools.    PAST MEDICAL HISTORY:    Past Medical History:   Diagnosis Date   • Abdominal pain     lower, hypo gastrium, s/p surgery      • Anomalous atrioventricular excitation    • C. difficile colitis     Hx   • Colon cancer    • Conduction disorder of the heart      Supraventricular      • Depression    • Encounter for screening for malignant neoplasm of colon    • Essential hypertension    • Family history of malignant neoplasm of digestive organ    • History of colon polyps    • History of malignant neoplasm of colon     s/p resection in 2009 and chemo   • History of malignant neoplasm of gastrointestinal tract    • Hyperaldosteronism    • Hyperplastic polyp of large intestine    • Hypertensive disorder    • Hypokalemia    • Intramural leiomyoma of uterus    • 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     Folate and B12 ordered      • Menstruation disorder     abnormal bleeding from female genital tract      • MRSA infection     Hx   • Screening for malignant neoplasm of breast    • Vitamin D deficiency    • Peter-Parkinson-White (WPW) pattern        SOCIAL HISTORY:    Social History   Substance Use Topics   • Smoking status: Never Smoker   • Smokeless tobacco: Never Used   • Alcohol use No       Surgical History :  Past Surgical History:   Procedure Laterality Date   • CARDIAC ABLATION     • CARDIAC CATHETERIZATION  07/02/2007    No significant coronary artery disease with normal left ventricular function   • COLECTOMY PARTIAL / TOTAL  12/23/2009    Mid sigmoid obstructing colon cancer   • ECG CONVERTED  09/29/2008    Sinus rhythm with short NJ. Minimal voltage criteria for LVH, may be  "normal variant T wave abnormality, consider inferolateral ischemia. Prolonged QT Abnormal ECG   • ENDOSCOPY AND COLONOSCOPY  06/27/2016    External and internal hemorrhoids. The examination was otherwise normal. No specimens collected   • HERNIA REPAIR     • TUBAL ABDOMINAL LIGATION         ALLERGIES:    Allergies   Allergen Reactions   • Tums [Calcium Carbonate Antacid] Anaphylaxis   • Sulfa Antibiotics Rash       REVIEW OF SYSTEMS:      CONSTITUTIONAL:  No fever, chills, or night sweats.     HEENT:  No epistaxis, mouth sores, or difficulty swallowing.    RESPIRATORY:  No new shortness of breath or cough at present.    CARDIOVASCULAR:  No chest pain or palpitations.    GASTROINTESTINAL:  No abdominal pain, nausea, vomiting, or blood in the stool.    GENITOURINARY:  No dysuria or hematuria.    MUSCULOSKELETAL:  No any new back pain or arthralgias.     NEUROLOGICAL:  No tingling or numbness. No new headache or dizziness.     LYMPHATICS:  Denies any abnormal swollen and anywhere in the body.    SKIN:  Denies any new skin rash.    PHYSICAL EXAMINATION:      VITAL SIGNS:  /100  Pulse 70  Temp 98.7 °F (37.1 °C) (Temporal Artery )   Resp 16  Ht 160 cm (62.99\")  Wt 78.1 kg (172 lb 2.9 oz)  BMI 30.51 kg/m2    GENERAL:  Not in any distress.    HEENT:  Normocephalic, Atraumatic.Mild Conjunctival pallor. No icterus.  No Facial Asymmetry noted.    NECK:  No adenopathy. No JVD.    RESPIRATORY:  Fair air entry bilateral. No rhonchi or wheezing.    CARDIOVASCULAR:  S1, S2. Regular rate and rhythm. No murmur or gallop appreciated.    ABDOMEN:  Soft, obese, nontender. Bowel sounds present in all four quadrants.  No organomegaly appreciated.    EXTREMITIES:  No edema.No Calf Tenderness.    NEUROLOGIC:  Alert, awake and oriented ×3.      SKIN : No new skin lesion identified  DIAGNOSTIC DATA:    Glucose   Date Value Ref Range Status   12/18/2017 91 60 - 100 mg/dL Final     Sodium   Date Value Ref Range Status   12/18/2017 " 139 137 - 145 mmol/L Final     Potassium   Date Value Ref Range Status   12/18/2017 3.9 3.5 - 5.1 mmol/L Final     CO2   Date Value Ref Range Status   12/18/2017 22.0 22.0 - 31.0 mmol/L Final     Chloride   Date Value Ref Range Status   12/18/2017 105 95 - 110 mmol/L Final     Anion Gap   Date Value Ref Range Status   12/18/2017 12.0 5.0 - 15.0 mmol/L Final     Creatinine   Date Value Ref Range Status   12/18/2017 0.93 0.50 - 1.00 mg/dL Final     BUN   Date Value Ref Range Status   12/18/2017 10 7 - 21 mg/dL Final     BUN/Creatinine Ratio   Date Value Ref Range Status   12/18/2017 10.8 7.0 - 25.0 Final     Calcium   Date Value Ref Range Status   12/18/2017 9.5 8.4 - 10.2 mg/dL Final     eGFR Non  Amer   Date Value Ref Range Status   12/18/2017 63 51 - 120 mL/min/1.73 Final     Alkaline Phosphatase   Date Value Ref Range Status   12/18/2017 58 38 - 126 U/L Final     Total Protein   Date Value Ref Range Status   12/18/2017 7.3 6.3 - 8.6 g/dL Final     ALT (SGPT)   Date Value Ref Range Status   12/18/2017 31 9 - 52 U/L Final     AST (SGOT)   Date Value Ref Range Status   12/18/2017 23 14 - 36 U/L Final     Total Bilirubin   Date Value Ref Range Status   12/18/2017 0.3 0.2 - 1.3 mg/dL Final     Albumin   Date Value Ref Range Status   12/18/2017 4.20 3.40 - 4.80 g/dL Final     Globulin   Date Value Ref Range Status   12/18/2017 3.1 2.3 - 3.5 gm/dL Final     A/G Ratio   Date Value Ref Range Status   12/18/2017 1.4 1.1 - 1.8 g/dL Final     Lab Results   Component Value Date    WBC 6.61 12/18/2017    HGB 13.7 12/18/2017    HCT 39.1 12/18/2017    MCV 86.7 12/18/2017     12/18/2017     Lab Results   Component Value Date    NEUTROABS 4.52 12/18/2017     Lab Results   Component Value Date    CEA 0.9 12/04/2015   ]        ASSESSMENT AND PLAN:      1. Stage II colon cancer, diagnosed in 2009, status post surgery and 6 months adjuvant Xeloda chemotherapy. She had a colonoscopy in 2016. She is on clinical surveillance  at this point. Will return to clinic in one yr with repeat labs.    2. Health maintenance, she does not smoke, she has yearly mammograms.    3. Hypertension, BP today is 156/100       This document has been signed by YONY Vazquez on December 18, 2017 2:04 PM

## 2018-02-13 ENCOUNTER — HOSPITAL ENCOUNTER (EMERGENCY)
Facility: HOSPITAL | Age: 53
Discharge: HOME | End: 2018-02-14
Attending: EMERGENCY MEDICINE | Admitting: EMERGENCY MEDICINE

## 2018-02-13 ENCOUNTER — APPOINTMENT (OUTPATIENT)
Dept: GENERAL RADIOLOGY | Facility: HOSPITAL | Age: 53
End: 2018-02-13

## 2018-02-13 DIAGNOSIS — R07.89 CHEST WALL PAIN: Primary | ICD-10-CM

## 2018-02-13 PROCEDURE — 71046 X-RAY EXAM CHEST 2 VIEWS: CPT

## 2018-02-13 PROCEDURE — 99284 EMERGENCY DEPT VISIT MOD MDM: CPT

## 2018-02-13 RX ORDER — IBUPROFEN 600 MG/1
600 TABLET ORAL EVERY 6 HOURS PRN
Qty: 15 TABLET | Refills: 0 | Status: SHIPPED | OUTPATIENT
Start: 2018-02-13 | End: 2018-03-29

## 2018-02-13 RX ADMIN — HYDROCODONE BITARTRATE AND ACETAMINOPHEN 1 TABLET: 5; 325 TABLET ORAL at 23:50

## 2018-02-13 RX ADMIN — ONDANSETRON 4 MG: 4 TABLET, ORALLY DISINTEGRATING ORAL at 23:51

## 2018-02-14 VITALS
HEART RATE: 86 BPM | DIASTOLIC BLOOD PRESSURE: 100 MMHG | WEIGHT: 170 LBS | RESPIRATION RATE: 20 BRPM | OXYGEN SATURATION: 100 % | SYSTOLIC BLOOD PRESSURE: 160 MMHG | HEIGHT: 63 IN | BODY MASS INDEX: 30.12 KG/M2 | TEMPERATURE: 98.2 F

## 2018-02-14 RX ORDER — HYDROCODONE BITARTRATE AND ACETAMINOPHEN 5; 325 MG/1; MG/1
1 TABLET ORAL ONCE
Status: COMPLETED | OUTPATIENT
Start: 2018-02-14 | End: 2018-02-13

## 2018-02-14 RX ORDER — CLONIDINE HYDROCHLORIDE 0.1 MG/1
0.1 TABLET ORAL ONCE
Status: COMPLETED | OUTPATIENT
Start: 2018-02-14 | End: 2018-02-14

## 2018-02-14 RX ORDER — ONDANSETRON 4 MG/1
4 TABLET, ORALLY DISINTEGRATING ORAL ONCE
Status: COMPLETED | OUTPATIENT
Start: 2018-02-14 | End: 2018-02-13

## 2018-02-14 RX ADMIN — CLONIDINE HYDROCHLORIDE 0.1 MG: 0.1 TABLET ORAL at 00:56

## 2018-02-14 RX ADMIN — METOPROLOL TARTRATE 50 MG: 25 TABLET ORAL at 00:06

## 2018-02-14 NOTE — DISCHARGE INSTRUCTIONS
Chest Wall Pain  Chest wall pain is pain in or around the bones and muscles of your chest. Sometimes, an injury causes this pain. Sometimes, the cause may not be known. This pain may take several weeks or longer to get better.  Follow these instructions at home:  Pay attention to any changes in your symptoms. Take these actions to help with your pain:  · Rest as told by your doctor.  · Avoid activities that cause pain. Try not to use your chest, belly (abdominal), or side muscles to lift heavy things.  · If directed, apply ice to the painful area:  ¨ Put ice in a plastic bag.  ¨ Place a towel between your skin and the bag.  ¨ Leave the ice on for 20 minutes, 2-3 times per day.  · Take over-the-counter and prescription medicines only as told by your doctor.  · Do not use tobacco products, including cigarettes, chewing tobacco, and e-cigarettes. If you need help quitting, ask your doctor.  · Keep all follow-up visits as told by your doctor. This is important.  Contact a doctor if:  · You have a fever.  · Your chest pain gets worse.  · You have new symptoms.  Get help right away if:  · You feel sick to your stomach (nauseous) or you throw up (vomit).  · You feel sweaty or light-headed.  · You have a cough with phlegm (sputum) or you cough up blood.  · You are short of breath.  This information is not intended to replace advice given to you by your health care provider. Make sure you discuss any questions you have with your health care provider.  Document Released: 06/05/2009 Document Revised: 05/25/2017 Document Reviewed: 03/14/2016  [employer] Interactive Patient Education © 2017 [employer]      Hypertension  Hypertension, commonly called high blood pressure, is when the force of blood pumping through the arteries is too strong. The arteries are the blood vessels that carry blood from the heart throughout the body. Hypertension forces the heart to work harder to pump blood and may cause arteries to become narrow  "or stiff. Having untreated or uncontrolled hypertension can cause heart attacks, strokes, kidney disease, and other problems.  A blood pressure reading consists of a higher number over a lower number. Ideally, your blood pressure should be below 120/80. The first (\"top\") number is called the systolic pressure. It is a measure of the pressure in your arteries as your heart beats. The second (\"bottom\") number is called the diastolic pressure. It is a measure of the pressure in your arteries as the heart relaxes.  What are the causes?  The cause of this condition is not known.  What increases the risk?  Some risk factors for high blood pressure are under your control. Others are not.  Factors you can change   · Smoking.  · Having type 2 diabetes mellitus, high cholesterol, or both.  · Not getting enough exercise or physical activity.  · Being overweight.  · Having too much fat, sugar, calories, or salt (sodium) in your diet.  · Drinking too much alcohol.  Factors that are difficult or impossible to change   · Having chronic kidney disease.  · Having a family history of high blood pressure.  · Age. Risk increases with age.  · Race. You may be at higher risk if you are -American.  · Gender. Men are at higher risk than women before age 45. After age 65, women are at higher risk than men.  · Having obstructive sleep apnea.  · Stress.  What are the signs or symptoms?  Extremely high blood pressure (hypertensive crisis) may cause:  · Headache.  · Anxiety.  · Shortness of breath.  · Nosebleed.  · Nausea and vomiting.  · Severe chest pain.  · Jerky movements you cannot control (seizures).  How is this diagnosed?  This condition is diagnosed by measuring your blood pressure while you are seated, with your arm resting on a surface. The cuff of the blood pressure monitor will be placed directly against the skin of your upper arm at the level of your heart. It should be measured at least twice using the same arm. Certain " conditions can cause a difference in blood pressure between your right and left arms.  Certain factors can cause blood pressure readings to be lower or higher than normal (elevated) for a short period of time:  · When your blood pressure is higher when you are in a health care provider's office than when you are at home, this is called white coat hypertension. Most people with this condition do not need medicines.  · When your blood pressure is higher at home than when you are in a health care provider's office, this is called masked hypertension. Most people with this condition may need medicines to control blood pressure.  If you have a high blood pressure reading during one visit or you have normal blood pressure with other risk factors:  · You may be asked to return on a different day to have your blood pressure checked again.  · You may be asked to monitor your blood pressure at home for 1 week or longer.  If you are diagnosed with hypertension, you may have other blood or imaging tests to help your health care provider understand your overall risk for other conditions.  How is this treated?  This condition is treated by making healthy lifestyle changes, such as eating healthy foods, exercising more, and reducing your alcohol intake. Your health care provider may prescribe medicine if lifestyle changes are not enough to get your blood pressure under control, and if:  · Your systolic blood pressure is above 130.  · Your diastolic blood pressure is above 80.  Your personal target blood pressure may vary depending on your medical conditions, your age, and other factors.  Follow these instructions at home:  Eating and drinking   · Eat a diet that is high in fiber and potassium, and low in sodium, added sugar, and fat. An example eating plan is called the DASH (Dietary Approaches to Stop Hypertension) diet. To eat this way:  ¨ Eat plenty of fresh fruits and vegetables. Try to fill half of your plate at each meal  with fruits and vegetables.  ¨ Eat whole grains, such as whole wheat pasta, brown rice, or whole grain bread. Fill about one quarter of your plate with whole grains.  ¨ Eat or drink low-fat dairy products, such as skim milk or low-fat yogurt.  ¨ Avoid fatty cuts of meat, processed or cured meats, and poultry with skin. Fill about one quarter of your plate with lean proteins, such as fish, chicken without skin, beans, eggs, and tofu.  ¨ Avoid premade and processed foods. These tend to be higher in sodium, added sugar, and fat.  · Reduce your daily sodium intake. Most people with hypertension should eat less than 1,500 mg of sodium a day.  · Limit alcohol intake to no more than 1 drink a day for nonpregnant women and 2 drinks a day for men. One drink equals 12 oz of beer, 5 oz of wine, or 1½ oz of hard liquor.  Lifestyle   · Work with your health care provider to maintain a healthy body weight or to lose weight. Ask what an ideal weight is for you.  · Get at least 30 minutes of exercise that causes your heart to beat faster (aerobic exercise) most days of the week. Activities may include walking, swimming, or biking.  · Include exercise to strengthen your muscles (resistance exercise), such as pilates or lifting weights, as part of your weekly exercise routine. Try to do these types of exercises for 30 minutes at least 3 days a week.  · Do not use any products that contain nicotine or tobacco, such as cigarettes and e-cigarettes. If you need help quitting, ask your health care provider.  · Monitor your blood pressure at home as told by your health care provider.  · Keep all follow-up visits as told by your health care provider. This is important.  Medicines   · Take over-the-counter and prescription medicines only as told by your health care provider. Follow directions carefully. Blood pressure medicines must be taken as prescribed.  · Do not skip doses of blood pressure medicine. Doing this puts you at risk for  problems and can make the medicine less effective.  · Ask your health care provider about side effects or reactions to medicines that you should watch for.  Contact a health care provider if:  · You think you are having a reaction to a medicine you are taking.  · You have headaches that keep coming back (recurring).  · You feel dizzy.  · You have swelling in your ankles.  · You have trouble with your vision.  Get help right away if:  · You develop a severe headache or confusion.  · You have unusual weakness or numbness.  · You feel faint.  · You have severe pain in your chest or abdomen.  · You vomit repeatedly.  · You have trouble breathing.  Summary  · Hypertension is when the force of blood pumping through your arteries is too strong. If this condition is not controlled, it may put you at risk for serious complications.  · Your personal target blood pressure may vary depending on your medical conditions, your age, and other factors. For most people, a normal blood pressure is less than 120/80.  · Hypertension is treated with lifestyle changes, medicines, or a combination of both. Lifestyle changes include weight loss, eating a healthy, low-sodium diet, exercising more, and limiting alcohol.  This information is not intended to replace advice given to you by your health care provider. Make sure you discuss any questions you have with your health care provider.  Document Released: 12/18/2006 Document Revised: 11/15/2017 Document Reviewed: 11/15/2017  [email or web address] Interactive Patient Education © 2017 [employer]
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
## 2018-03-29 ENCOUNTER — OFFICE VISIT (OUTPATIENT)
Dept: FAMILY MEDICINE CLINIC | Facility: CLINIC | Age: 53
End: 2018-03-29

## 2018-03-29 VITALS
HEIGHT: 63 IN | WEIGHT: 179 LBS | BODY MASS INDEX: 31.71 KG/M2 | SYSTOLIC BLOOD PRESSURE: 138 MMHG | DIASTOLIC BLOOD PRESSURE: 80 MMHG

## 2018-03-29 DIAGNOSIS — I45.6 WOLFF-PARKINSON-WHITE (WPW) PATTERN: ICD-10-CM

## 2018-03-29 DIAGNOSIS — I75.023 BLUE TOE SYNDROME, BILATERAL (HCC): ICD-10-CM

## 2018-03-29 DIAGNOSIS — L72.0 INCLUSION CYST: ICD-10-CM

## 2018-03-29 DIAGNOSIS — I10 ESSENTIAL HYPERTENSION: Primary | ICD-10-CM

## 2018-03-29 DIAGNOSIS — F32.4 MAJOR DEPRESSIVE DISORDER WITH SINGLE EPISODE, IN PARTIAL REMISSION (HCC): ICD-10-CM

## 2018-03-29 PROCEDURE — 99214 OFFICE O/P EST MOD 30 MIN: CPT | Performed by: FAMILY MEDICINE

## 2018-03-29 RX ORDER — POTASSIUM CHLORIDE 20 MEQ/1
20 TABLET, EXTENDED RELEASE ORAL 2 TIMES DAILY
Qty: 180 TABLET | Refills: 3 | Status: SHIPPED | OUTPATIENT
Start: 2018-03-29 | End: 2019-04-05 | Stop reason: SDUPTHER

## 2018-03-29 RX ORDER — LISINOPRIL 40 MG/1
40 TABLET ORAL DAILY
Qty: 90 TABLET | Refills: 3 | Status: SHIPPED | OUTPATIENT
Start: 2018-03-29 | End: 2019-04-05 | Stop reason: SDUPTHER

## 2018-03-29 RX ORDER — AMLODIPINE BESYLATE 10 MG/1
10 TABLET ORAL DAILY
Qty: 30 TABLET | Refills: 5 | Status: SHIPPED | OUTPATIENT
Start: 2018-03-29 | End: 2018-11-04 | Stop reason: SDUPTHER

## 2018-03-29 RX ORDER — AMITRIPTYLINE HYDROCHLORIDE 50 MG/1
50 TABLET, FILM COATED ORAL NIGHTLY
Qty: 90 TABLET | Refills: 3 | Status: SHIPPED | OUTPATIENT
Start: 2018-03-29 | End: 2019-04-05 | Stop reason: SDUPTHER

## 2018-03-29 RX ORDER — SPIRONOLACTONE 25 MG/1
25 TABLET ORAL DAILY
Qty: 90 TABLET | Refills: 3 | Status: SHIPPED | OUTPATIENT
Start: 2018-03-29 | End: 2019-04-05 | Stop reason: SDUPTHER

## 2018-03-29 RX ORDER — IBUPROFEN 600 MG/1
600 TABLET ORAL EVERY 6 HOURS PRN
Qty: 15 TABLET | Refills: 1 | Status: SHIPPED | OUTPATIENT
Start: 2018-03-29 | End: 2019-03-28

## 2018-03-29 RX ORDER — METOPROLOL TARTRATE 50 MG/1
50 TABLET, FILM COATED ORAL 2 TIMES DAILY
Qty: 180 TABLET | Refills: 3 | Status: SHIPPED | OUTPATIENT
Start: 2018-03-29 | End: 2019-04-05 | Stop reason: SDUPTHER

## 2018-03-29 NOTE — PROGRESS NOTES
Subjective   Pati Sotelo is a 52 y.o. female.     History of Present Illness   reevaluation by Sjogren's office.  Diagnosis hypertension Peter-Parkinson-White history of colon carcinoma history of chronic hypokalemia.  In the interim last lab work was acceptable.  Has developed inclusion cyst on the left middle finger.  Overall stable.    The following portions of the patient's history were reviewed and updated as appropriate: allergies, current medications, past family history, past medical history, past social history, past surgical history and problem list.    Review of Systems   Constitutional: Negative for activity change, appetite change, fatigue and unexpected weight change.   HENT: Negative for trouble swallowing and voice change.    Eyes: Negative for redness and visual disturbance.   Respiratory: Negative for cough and wheezing.    Cardiovascular: Negative for chest pain and palpitations.   Gastrointestinal: Negative for abdominal pain, constipation, diarrhea, nausea and vomiting.   Genitourinary: Negative for urgency.   Musculoskeletal: Negative for joint swelling.   Neurological: Negative for syncope and headaches.   Hematological: Negative for adenopathy.   Psychiatric/Behavioral: Negative for sleep disturbance.       Objective   Physical Exam   Constitutional: She is oriented to person, place, and time. She appears well-developed.   HENT:   Head: Normocephalic.   Right Ear: External ear normal.   Nose: Nose normal.   Mouth/Throat: Oropharynx is clear and moist.   Eyes: Pupils are equal, round, and reactive to light.   Neck: Normal range of motion. No thyromegaly present.   Cardiovascular: Normal rate, regular rhythm, normal heart sounds and intact distal pulses.  Exam reveals no gallop and no friction rub.    No murmur heard.  Pulmonary/Chest: Breath sounds normal.   Abdominal: Soft. She exhibits no distension and no mass. There is no tenderness.   Musculoskeletal: Normal range of motion.    Middle finger left at the base of the nailbed shows an inclusion cyst clear approximately half a centimeter or so in size small.  The middle that care tinnitus.  Can be treated locally.   Neurological: She is alert and oriented to person, place, and time. She has normal reflexes.   Skin: Skin is warm and dry.   Psychiatric: She has a normal mood and affect.       Assessment/Plan   Problems Addressed this Visit        Cardiovascular and Mediastinum    Peter-Parkinson-White (WPW) pattern (Chronic)    Relevant Medications    amLODIPine (NORVASC) 10 MG tablet    metoprolol tartrate (LOPRESSOR) 50 MG tablet    Essential hypertension - Primary (Chronic)    Relevant Medications    amLODIPine (NORVASC) 10 MG tablet    lisinopril (PRINIVIL,ZESTRIL) 40 MG tablet    metoprolol tartrate (LOPRESSOR) 50 MG tablet    potassium chloride (KLOR-CON) 20 MEQ CR tablet    spironolactone (ALDACTONE) 25 MG tablet       Other    Depression (Chronic)    Relevant Medications    amitriptyline (ELAVIL) 50 MG tablet    BMI 31.0-31.9,adult      Other Visit Diagnoses     Inclusion cyst        finger      Blue toe syndrome, bilateral        Relevant Medications    aspirin 81 MG tablet       on wt loss measures and programs   on exfoliation no manipulation of the finger.  Refilled medications recheck 6 months betime for lab    Body mass index is 31.71 kg/m².

## 2018-06-26 ENCOUNTER — TRANSCRIBE ORDERS (OUTPATIENT)
Dept: LAB | Facility: HOSPITAL | Age: 53
End: 2018-06-26

## 2018-06-26 DIAGNOSIS — N18.2 CHRONIC KIDNEY DISEASE, STAGE II (MILD): Primary | ICD-10-CM

## 2018-06-27 ENCOUNTER — APPOINTMENT (OUTPATIENT)
Dept: LAB | Facility: HOSPITAL | Age: 53
End: 2018-06-27

## 2018-06-27 LAB
25(OH)D3 SERPL-MCNC: 34.1 NG/ML (ref 30–100)
ALBUMIN SERPL-MCNC: 4.5 G/DL (ref 3.4–4.8)
ANION GAP SERPL CALCULATED.3IONS-SCNC: 11 MMOL/L (ref 5–15)
BUN BLD-MCNC: 10 MG/DL (ref 7–21)
BUN/CREAT SERPL: 11.6 (ref 7–25)
CALCIUM SPEC-SCNC: 9.3 MG/DL (ref 8.4–10.2)
CHLORIDE SERPL-SCNC: 102 MMOL/L (ref 95–110)
CO2 SERPL-SCNC: 27 MMOL/L (ref 22–31)
CREAT BLD-MCNC: 0.86 MG/DL (ref 0.5–1)
GFR SERPL CREATININE-BSD FRML MDRD: 69 ML/MIN/1.73 (ref 51–120)
GLUCOSE BLD-MCNC: 97 MG/DL (ref 60–100)
PHOSPHATE SERPL-MCNC: 3.5 MG/DL (ref 2.4–4.4)
POTASSIUM BLD-SCNC: 3.9 MMOL/L (ref 3.5–5.1)
SODIUM BLD-SCNC: 140 MMOL/L (ref 137–145)

## 2018-06-27 PROCEDURE — 36415 COLL VENOUS BLD VENIPUNCTURE: CPT | Performed by: INTERNAL MEDICINE

## 2018-06-27 PROCEDURE — 82306 VITAMIN D 25 HYDROXY: CPT | Performed by: INTERNAL MEDICINE

## 2018-06-27 PROCEDURE — 80069 RENAL FUNCTION PANEL: CPT | Performed by: INTERNAL MEDICINE

## 2018-09-28 ENCOUNTER — OFFICE VISIT (OUTPATIENT)
Dept: FAMILY MEDICINE CLINIC | Facility: CLINIC | Age: 53
End: 2018-09-28

## 2018-09-28 VITALS
DIASTOLIC BLOOD PRESSURE: 82 MMHG | OXYGEN SATURATION: 97 % | SYSTOLIC BLOOD PRESSURE: 130 MMHG | HEIGHT: 63 IN | WEIGHT: 176.6 LBS | HEART RATE: 59 BPM | BODY MASS INDEX: 31.29 KG/M2

## 2018-09-28 DIAGNOSIS — Z23 NEED FOR IMMUNIZATION AGAINST INFLUENZA: ICD-10-CM

## 2018-09-28 DIAGNOSIS — I10 ESSENTIAL HYPERTENSION: Primary | Chronic | ICD-10-CM

## 2018-09-28 DIAGNOSIS — F32.4 MAJOR DEPRESSIVE DISORDER WITH SINGLE EPISODE, IN PARTIAL REMISSION (HCC): Chronic | ICD-10-CM

## 2018-09-28 DIAGNOSIS — Z12.31 VISIT FOR SCREENING MAMMOGRAM: ICD-10-CM

## 2018-09-28 PROCEDURE — 99213 OFFICE O/P EST LOW 20 MIN: CPT | Performed by: FAMILY MEDICINE

## 2018-09-28 PROCEDURE — 90471 IMMUNIZATION ADMIN: CPT | Performed by: FAMILY MEDICINE

## 2018-09-28 PROCEDURE — 90674 CCIIV4 VAC NO PRSV 0.5 ML IM: CPT | Performed by: FAMILY MEDICINE

## 2018-09-28 NOTE — PROGRESS NOTES
Subjective   Pati Sotelo is a 53 y.o. female.     History of Present Illness   follow-up hypertension obesity history of colon carcinoma etc.  In interim last lab work was acceptable.  Does need a mammogram a flu vaccine.  Does use also help with weight loss.  Overall feels well however.  The following portions of the patient's history were reviewed and updated as appropriate: allergies, current medications, past family history, past medical history, past social history, past surgical history and problem list.    Review of Systems   Constitutional: Negative for activity change, appetite change, fatigue and unexpected weight change.   HENT: Negative for trouble swallowing and voice change.    Eyes: Negative for redness and visual disturbance.   Respiratory: Negative for cough and wheezing.    Cardiovascular: Negative for chest pain and palpitations.   Gastrointestinal: Negative for abdominal pain, constipation, diarrhea, nausea and vomiting.   Genitourinary: Negative for urgency.   Musculoskeletal: Negative for joint swelling.   Neurological: Negative for syncope and headaches.   Hematological: Negative for adenopathy.   Psychiatric/Behavioral: Negative for sleep disturbance.       Objective   Physical Exam   Constitutional: She is oriented to person, place, and time. She appears well-developed.   HENT:   Head: Normocephalic.   Nose: Nose normal.   Eyes: Pupils are equal, round, and reactive to light.   Neck: Normal range of motion. No thyromegaly present.   Cardiovascular: Normal rate, regular rhythm, normal heart sounds and intact distal pulses.  Exam reveals no gallop and no friction rub.    No murmur heard.  Pulmonary/Chest: Breath sounds normal.   Abdominal: Soft. She exhibits no distension and no mass. There is no tenderness.   Musculoskeletal: Normal range of motion.   Neurological: She is alert and oriented to person, place, and time. She has normal reflexes.   Skin: Skin is warm and dry.   Psychiatric:  She has a normal mood and affect.       Assessment/Plan   Pati was seen today for 6 month recheck.    Diagnoses and all orders for this visit:    Essential hypertension  -     Ambulatory Referral to Nutrition Services    Major depressive disorder with single episode, in partial remission (CMS/[facility])    BMI 31.0-31.9,adult  -     Ambulatory Referral to Nutrition Services    Need for immunization against influenza  -     Flu Vaccine Quad PF 3YR+    Visit for screening mammogram  -     Mammo Screening Digital Tomosynthesis Bilateral With CAD       on lifestyle measures recheck 6 months

## 2018-10-15 ENCOUNTER — TELEPHONE (OUTPATIENT)
Dept: FAMILY MEDICINE CLINIC | Facility: CLINIC | Age: 53
End: 2018-10-15

## 2018-11-04 DIAGNOSIS — I10 ESSENTIAL HYPERTENSION: ICD-10-CM

## 2018-11-05 RX ORDER — AMLODIPINE BESYLATE 10 MG/1
TABLET ORAL
Qty: 30 TABLET | Refills: 5 | Status: SHIPPED | OUTPATIENT
Start: 2018-11-05 | End: 2019-04-05 | Stop reason: SDUPTHER

## 2018-11-09 ENCOUNTER — HOSPITAL ENCOUNTER (OUTPATIENT)
Dept: NUTRITION | Facility: HOSPITAL | Age: 53
Discharge: HOME | End: 2018-11-09
Admitting: DIETITIAN, REGISTERED

## 2018-11-09 PROCEDURE — 97802 MEDICAL NUTRITION INDIV IN: CPT | Performed by: DIETITIAN, REGISTERED

## 2018-11-09 NOTE — PROGRESS NOTES
"Adult Outpatient Nutrition  Assessment    Patient Name:  Pati Sotelo  YOB: 1965  MRN: [id number]    Assessment Date:  11/9/2018    Comments:  Pt was referred by George Ortiz MD, for weight mgt and essential HTN.  Pt has history of colon cancer and lost a significant amount of weight. She was told to snack to gain her weight back and she has gained. She is interested in weight loss at this time and she is familiar with the low sodium diet since her  was on dialysis and she helped him with his diet.  Reviewed the low sodium diet with materials for home use and the 1400 calorie diet using carb counting. Provided food list and made a menu with the foods the patient likes. It was difficult to determine if the patient had a good understanding of the meal plan, but she agreed this discussion was helpful to her. She plans to minimize her excessive snacking in an effort to lose weight. Pt agreed to a follow up appt within one month. Education will continue at that time.           General Info     Row Name 11/09/18 1127       Today's Session    Person(s) attending today's session Patient       General Information    Lives With alone              Physical Findings     Row Name 11/09/18 1127          Physical Findings    Overall Physical Appearance overweight               Nutritional Info/Activity     Row Name 11/09/18 1128       Nutritional Information    Have you had weight changes? Yes    Describe weight changes lost weight with cancer treatments and has gained her weight back by snacking\"they told me to snack\"    What is your desired body weight? 70.3 kg (155 lb)    What is your motivation to lose weight? feel better    Functional Status able to prepare meals;able to purchase food;ambulatory    Food Behaviors Continuous snacking;Boredom eater;Stress eater;Comfort eater    What is the biggest challenge you have with your diet? Portions;Knowledge;Poor choices       Eating Environment    Eating " environment Alone       Physical Activity    Are you currently involved in an activity/exercise program?  No    Reasons for Inactivity --   works as a  in a school/limited in walking sometimes            Home Nutrition Report     Row Name 11/09/18 1137          Home Nutrition Report    Typical Food/Fluid Intake eats 3 meals per day-snacks after work and eats in the middle of the night IF she wakes up-3-4 times per week.     Food Preferences drinks sweet tea, likes fruits and vegetables and doesn't cook much since she lives alone             Estimated/Assessed Needs     Row Name 11/09/18 1139          Estimated/Assessed Needs    Additional Documentation Calorie Requirements (Group)        Calorie Requirements    Estimated Calorie Requirement (kcal/day) 1400   for weight mgt               Labs/Tests/Procedures/Meds     Row Name 11/09/18 1140          Labs/Procedures/Meds    Lab Results Reviewed reviewed        Medications    Pertinent Medications Reviewed reviewed           Electronically signed by:  Joanne Oakes RD  11/09/18 11:46 AM

## 2018-12-06 ENCOUNTER — HOSPITAL ENCOUNTER (OUTPATIENT)
Dept: NUTRITION | Facility: HOSPITAL | Age: 53
Discharge: HOME | End: 2018-12-06
Admitting: DIETITIAN, REGISTERED

## 2018-12-06 PROCEDURE — 97803 MED NUTRITION INDIV SUBSEQ: CPT | Performed by: DIETITIAN, REGISTERED

## 2018-12-06 NOTE — PROGRESS NOTES
Adult Outpatient Nutrition  Assessment    Patient Name:  Pati Sotelo  YOB: 1965  MRN: [id number]    Assessment Date:  12/6/2018    Comments:  Pt returned for a follow up visit. Has made many changes. Didn't offer much info but is pleased with her progress. Didn't have many questions and didn't want a review of the diet. Said she has a good understanding of the carb counting method. Pt said she would keep RDN posted of her progress. A follow up appt can be scheduled in the new year.    General Info     Row Name 12/06/18 1554       Today's Session    Person(s) attending today's session  Patient       General Information    Lives With  alone        Physical Findings     Row Name 12/06/18 1554          Physical Findings    Overall Physical Appearance  overweight           Nutritional Info/Activity     Row Name 12/06/18 1558       Nutritional Information    Have you had weight changes?  Yes    Describe weight changes  lost 2 lbs    Functional Status  able to prepare meals;able to purchase food;ambulatory       Eating Environment    Eating environment  Alone        Home Nutrition Report     Row Name 12/06/18 1556          Home Nutrition Report    Typical Food/Fluid Intake  pt said she is trying to avoid sugar added to foods. trying to eat less in the night, noticing the sodium content of foods while reading labels, wants to lose weight slowly, feeling more energetic and drinking more milk.             Labs/Tests/Procedures/Meds     Row Name 12/06/18 1600          Labs/Procedures/Meds    Lab Results Reviewed  reviewed        Medications    Pertinent Medications Reviewed  reviewed             Electronically signed by:  Joanne Oakes RD  12/06/18 4:06 PM

## 2018-12-18 ENCOUNTER — LAB (OUTPATIENT)
Dept: ONCOLOGY | Facility: HOSPITAL | Age: 53
End: 2018-12-18

## 2018-12-18 ENCOUNTER — OFFICE VISIT (OUTPATIENT)
Dept: ONCOLOGY | Facility: CLINIC | Age: 53
End: 2018-12-18

## 2018-12-18 VITALS
OXYGEN SATURATION: 97 % | SYSTOLIC BLOOD PRESSURE: 149 MMHG | HEART RATE: 59 BPM | TEMPERATURE: 98.7 F | RESPIRATION RATE: 18 BRPM | BODY MASS INDEX: 32.21 KG/M2 | HEIGHT: 63 IN | DIASTOLIC BLOOD PRESSURE: 96 MMHG | WEIGHT: 181.8 LBS

## 2018-12-18 DIAGNOSIS — Z85.038 H/O COLON CANCER, STAGE II: Chronic | ICD-10-CM

## 2018-12-18 DIAGNOSIS — Z85.038 HISTORY OF COLON CANCER: ICD-10-CM

## 2018-12-18 LAB
ALBUMIN SERPL-MCNC: 4.4 G/DL (ref 3.4–4.8)
ALBUMIN/GLOB SERPL: 1.4 G/DL (ref 1.1–1.8)
ALP SERPL-CCNC: 89 U/L (ref 38–126)
ALT SERPL W P-5'-P-CCNC: 28 U/L (ref 9–52)
ANION GAP SERPL CALCULATED.3IONS-SCNC: 11 MMOL/L (ref 5–15)
AST SERPL-CCNC: 32 U/L (ref 14–36)
BASOPHILS # BLD AUTO: 0.02 10*3/MM3 (ref 0–0.2)
BASOPHILS NFR BLD AUTO: 0.3 % (ref 0–2)
BILIRUB SERPL-MCNC: 0.3 MG/DL (ref 0.2–1.3)
BUN BLD-MCNC: 13 MG/DL (ref 7–21)
BUN/CREAT SERPL: 12.3 (ref 7–25)
CALCIUM SPEC-SCNC: 10 MG/DL (ref 8.4–10.2)
CHLORIDE SERPL-SCNC: 104 MMOL/L (ref 95–110)
CO2 SERPL-SCNC: 26 MMOL/L (ref 22–31)
CREAT BLD-MCNC: 1.06 MG/DL (ref 0.5–1)
DEPRECATED RDW RBC AUTO: 38.9 FL (ref 36.4–46.3)
EOSINOPHIL # BLD AUTO: 0.23 10*3/MM3 (ref 0–0.7)
EOSINOPHIL NFR BLD AUTO: 3.5 % (ref 0–7)
ERYTHROCYTE [DISTWIDTH] IN BLOOD BY AUTOMATED COUNT: 12.3 % (ref 11.5–14.5)
GFR SERPL CREATININE-BSD FRML MDRD: 54 ML/MIN/1.73 (ref 51–120)
GLOBULIN UR ELPH-MCNC: 3.2 GM/DL (ref 2.3–3.5)
GLUCOSE BLD-MCNC: 82 MG/DL (ref 60–100)
HCT VFR BLD AUTO: 40.2 % (ref 35–45)
HGB BLD-MCNC: 14.4 G/DL (ref 12–15.5)
IMM GRANULOCYTES # BLD: 0.01 10*3/MM3 (ref 0–0.02)
IMM GRANULOCYTES NFR BLD: 0.2 % (ref 0–0.5)
LYMPHOCYTES # BLD AUTO: 1.91 10*3/MM3 (ref 0.6–4.2)
LYMPHOCYTES NFR BLD AUTO: 29.1 % (ref 10–50)
MCH RBC QN AUTO: 31 PG (ref 26.5–34)
MCHC RBC AUTO-ENTMCNC: 35.8 G/DL (ref 31.4–36)
MCV RBC AUTO: 86.5 FL (ref 80–98)
MONOCYTES # BLD AUTO: 0.49 10*3/MM3 (ref 0–0.9)
MONOCYTES NFR BLD AUTO: 7.5 % (ref 0–12)
NEUTROPHILS # BLD AUTO: 3.9 10*3/MM3 (ref 2–8.6)
NEUTROPHILS NFR BLD AUTO: 59.4 % (ref 37–80)
PLATELET # BLD AUTO: 301 10*3/MM3 (ref 150–450)
PMV BLD AUTO: 9.7 FL (ref 8–12)
POTASSIUM BLD-SCNC: 4 MMOL/L (ref 3.5–5.1)
PROT SERPL-MCNC: 7.6 G/DL (ref 6.3–8.6)
RBC # BLD AUTO: 4.65 10*6/MM3 (ref 3.77–5.16)
SODIUM BLD-SCNC: 141 MMOL/L (ref 137–145)
WBC NRBC COR # BLD: 6.56 10*3/MM3 (ref 3.2–9.8)

## 2018-12-18 PROCEDURE — 36415 COLL VENOUS BLD VENIPUNCTURE: CPT | Performed by: NURSE PRACTITIONER

## 2018-12-18 PROCEDURE — 85025 COMPLETE CBC W/AUTO DIFF WBC: CPT

## 2018-12-18 PROCEDURE — 99214 OFFICE O/P EST MOD 30 MIN: CPT | Performed by: NURSE PRACTITIONER

## 2018-12-18 PROCEDURE — 80053 COMPREHEN METABOLIC PANEL: CPT

## 2018-12-18 NOTE — PROGRESS NOTES
DATE OF VISIT: 12/18/2018    REASON FOR VISIT:  Yearly follow-up for colon cancer    HISTORY OF PRESENT ILLNESS:  53 yr old female diagnosed with Stage II colon cancer in 2009, [id number]. She completed 6 months of Xeloda chemotherapy in the adjuvant setting.She is being followed yearly. She denies any changes with her bowels, denies any blood in stools. Her last colonoscopy was in 2016; due for repeat in 2021.           PAST MEDICAL HISTORY:    Past Medical History:   Diagnosis Date   • Abdominal pain     lower, hypo gastrium, s/p surgery      • Anomalous atrioventricular excitation    • C. difficile colitis     Hx   • Colon cancer (CMS/HCC)    • Conduction disorder of the heart      Supraventricular      • Depression    • Drug therapy    • Encounter for screening for malignant neoplasm of colon    • Essential hypertension    • Family history of malignant neoplasm of digestive organ    • History of colon polyps    • History of malignant neoplasm of colon     s/p resection in 2009 and chemo   • History of malignant neoplasm of gastrointestinal tract    • Hyperaldosteronism (CMS/HCC)    • Hyperplastic polyp of large intestine    • Hypertensive disorder    • Hypokalemia    • Intramural leiomyoma of uterus    • 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     Folate and B12 ordered      • Menstruation disorder     abnormal bleeding from female genital tract      • MRSA infection     Hx   • Screening for malignant neoplasm of breast    • Vitamin D deficiency    • Peter-Parkinson-White (WPW) pattern        SOCIAL HISTORY:    Social History     Tobacco Use   • Smoking status: Never Smoker   • Smokeless tobacco: Never Used   Substance Use Topics   • Alcohol use: No   • Drug use: No       Surgical History :  Past Surgical History:   Procedure Laterality Date   • CARDIAC ABLATION     • CARDIAC CATHETERIZATION  07/02/2007    No significant coronary artery disease with normal left ventricular function   • COLECTOMY PARTIAL / TOTAL   "12/23/2009    Mid sigmoid obstructing colon cancer   • ECG CONVERTED  09/29/2008    Sinus rhythm with short SC. Minimal voltage criteria for LVH, may be normal variant T wave abnormality, consider inferolateral ischemia. Prolonged QT Abnormal ECG   • ENDOSCOPY AND COLONOSCOPY  06/27/2016    External and internal hemorrhoids. The examination was otherwise normal. No specimens collected   • HERNIA REPAIR     • TUBAL ABDOMINAL LIGATION         ALLERGIES:    Allergies   Allergen Reactions   • Tums [Calcium Carbonate Antacid] Anaphylaxis   • Sulfa Antibiotics Rash       REVIEW OF SYSTEMS:      CONSTITUTIONAL:  No fever, chills, or night sweats.     HEENT:  No epistaxis, mouth sores, or difficulty swallowing.    RESPIRATORY:  No new shortness of breath or cough at present.    CARDIOVASCULAR:  No chest pain or palpitations.    GASTROINTESTINAL:  No abdominal pain, nausea, vomiting, or blood in the stool.    GENITOURINARY:  No dysuria or hematuria.    MUSCULOSKELETAL:  No any new back pain or arthralgias.     NEUROLOGICAL:  No tingling or numbness. No new headache or dizziness.     LYMPHATICS:  Denies any abnormal swollen and anywhere in the body.    SKIN:  Denies any new skin rash.    PHYSICAL EXAMINATION:      VITAL SIGNS:  /96   Pulse 59   Temp 98.7 °F (37.1 °C) (Temporal)   Resp 18   Ht 160 cm (63\")   Wt 82.5 kg (181 lb 12.8 oz)   SpO2 97%   BMI 32.20 kg/m²     GENERAL:  Not in any distress.    HEENT:  Normocephalic, Atraumatic.Mild Conjunctival pallor. No icterus. No Facial Asymmetry noted.    NECK:  No adenopathy. No JVD.    RESPIRATORY:  Fair air entry bilateral. No rhonchi or wheezing.    CARDIOVASCULAR:  S1, S2. Regular rate and rhythm. No murmur or gallop appreciated.    ABDOMEN:  Soft, obese, nontender. Bowel sounds present in all four quadrants.  No organomegaly appreciated.    EXTREMITIES:  No edema.No Calf Tenderness.    NEUROLOGIC:  Alert, awake and oriented ×3.      DIAGNOSTIC DATA:    Glucose "   Date Value Ref Range Status   06/27/2018 97 60 - 100 mg/dL Final     Sodium   Date Value Ref Range Status   06/27/2018 140 137 - 145 mmol/L Final     Potassium   Date Value Ref Range Status   06/27/2018 3.9 3.5 - 5.1 mmol/L Final     CO2   Date Value Ref Range Status   06/27/2018 27.0 22.0 - 31.0 mmol/L Final     Chloride   Date Value Ref Range Status   06/27/2018 102 95 - 110 mmol/L Final     Anion Gap   Date Value Ref Range Status   06/27/2018 11.0 5.0 - 15.0 mmol/L Final     Creatinine   Date Value Ref Range Status   06/27/2018 0.86 0.50 - 1.00 mg/dL Final     BUN   Date Value Ref Range Status   06/27/2018 10 7 - 21 mg/dL Final     BUN/Creatinine Ratio   Date Value Ref Range Status   06/27/2018 11.6 7.0 - 25.0 Final     Calcium   Date Value Ref Range Status   06/27/2018 9.3 8.4 - 10.2 mg/dL Final     eGFR Non  Amer   Date Value Ref Range Status   06/27/2018 69 51 - 120 mL/min/1.73 Final     Alkaline Phosphatase   Date Value Ref Range Status   12/18/2017 58 38 - 126 U/L Final     Total Protein   Date Value Ref Range Status   12/18/2017 7.3 6.3 - 8.6 g/dL Final     ALT (SGPT)   Date Value Ref Range Status   12/18/2017 31 9 - 52 U/L Final     AST (SGOT)   Date Value Ref Range Status   12/18/2017 23 14 - 36 U/L Final     Total Bilirubin   Date Value Ref Range Status   12/18/2017 0.3 0.2 - 1.3 mg/dL Final     Albumin   Date Value Ref Range Status   06/27/2018 4.50 3.40 - 4.80 g/dL Final     Globulin   Date Value Ref Range Status   12/18/2017 3.1 2.3 - 3.5 gm/dL Final     Lab Results   Component Value Date    WBC 6.56 12/18/2018    HGB 14.4 12/18/2018    HCT 40.2 12/18/2018    MCV 86.5 12/18/2018     12/18/2018     Lab Results   Component Value Date    NEUTROABS 3.90 12/18/2018     Lab Results   Component Value Date    CEA 0.9 12/04/2015   ]        ASSESSMENT AND PLAN:       1. Stage II colon cancer, diagnosed in 2009, status post surgery and 6 months adjuvant Xeloda chemotherapy. She had a colonoscopy  in 2016. She is on clinical surveillance at this point. Will return to clinic in one yr with repeat labs.     2. Health maintenance, she does not smoke, she has yearly mammograms.     3. Hypertension, BP today is 149/96.                    This document has been signed by YONY Vazquez on December 18, 2018 1:47 PM

## 2019-03-28 ENCOUNTER — OFFICE VISIT (OUTPATIENT)
Dept: FAMILY MEDICINE CLINIC | Facility: CLINIC | Age: 54
End: 2019-03-28

## 2019-03-28 VITALS
SYSTOLIC BLOOD PRESSURE: 126 MMHG | BODY MASS INDEX: 32.18 KG/M2 | DIASTOLIC BLOOD PRESSURE: 78 MMHG | HEIGHT: 63 IN | WEIGHT: 181.6 LBS

## 2019-03-28 DIAGNOSIS — I10 ESSENTIAL HYPERTENSION: Primary | Chronic | ICD-10-CM

## 2019-03-28 DIAGNOSIS — F32.4 MAJOR DEPRESSIVE DISORDER WITH SINGLE EPISODE, IN PARTIAL REMISSION (HCC): Chronic | ICD-10-CM

## 2019-03-28 DIAGNOSIS — Z23 NEED FOR VACCINATION: ICD-10-CM

## 2019-03-28 PROCEDURE — 90471 IMMUNIZATION ADMIN: CPT | Performed by: FAMILY MEDICINE

## 2019-03-28 PROCEDURE — 90750 HZV VACC RECOMBINANT IM: CPT | Performed by: FAMILY MEDICINE

## 2019-03-28 PROCEDURE — 99213 OFFICE O/P EST LOW 20 MIN: CPT | Performed by: FAMILY MEDICINE

## 2019-03-28 NOTE — PROGRESS NOTES
Subjective   Pati Sotelo is a 53 y.o. female.     History of Present Illness   evaluation hypertension obesity mild depressive disorder history of colon cancer.  Interim maintain diet and exercise started joining a gym.  Weights not gone down but not gone up.  Blood pressures holding last lab work within normal limits.    The following portions of the patient's history were reviewed and updated as appropriate: allergies, current medications, past family history, past medical history, past social history, past surgical history and problem list.    Review of Systems   Constitutional: Negative for activity change, appetite change, fatigue and unexpected weight change.   HENT: Negative for trouble swallowing and voice change.    Eyes: Negative for redness and visual disturbance.   Respiratory: Negative for cough and wheezing.    Cardiovascular: Negative for chest pain and palpitations.   Gastrointestinal: Negative for abdominal pain, constipation, diarrhea, nausea and vomiting.   Genitourinary: Negative for urgency.   Musculoskeletal: Negative for joint swelling.   Neurological: Negative for syncope and headaches.   Hematological: Negative for adenopathy.   Psychiatric/Behavioral: Negative for sleep disturbance.       Objective   Physical Exam   Constitutional: She is oriented to person, place, and time. She appears well-developed.   HENT:   Head: Normocephalic.   Nose: Nose normal.   Eyes: Pupils are equal, round, and reactive to light.   Neck: Normal range of motion. No thyromegaly present.   Cardiovascular: Normal rate, regular rhythm, normal heart sounds and intact distal pulses. Exam reveals no gallop and no friction rub.   No murmur heard.  Pulmonary/Chest: Breath sounds normal.   Abdominal: Soft. She exhibits no distension and no mass. There is no tenderness.   Musculoskeletal: Normal range of motion.   Neurological: She is alert and oriented to person, place, and time. She has normal reflexes.   Skin: Skin  is warm and dry.   Psychiatric: Her speech is normal and behavior is normal. Her affect is blunt.       Assessment/Plan   Pati was seen today for hypertension.    Diagnoses and all orders for this visit:    Essential hypertension    Need for vaccination  -     Shingrix Vaccine    BMI 31.0-31.9,adult    Major depressive disorder with single episode, in partial remission (CMS/[facility])       on wt loss measures and programs   lifestyle measures summertime hydration start shingles series recheck 6 months

## 2019-04-05 DIAGNOSIS — I10 ESSENTIAL HYPERTENSION: ICD-10-CM

## 2019-04-08 RX ORDER — LISINOPRIL 40 MG/1
40 TABLET ORAL DAILY
Qty: 90 TABLET | Refills: 3 | Status: SHIPPED | OUTPATIENT
Start: 2019-04-08 | End: 2020-04-23 | Stop reason: SDUPTHER

## 2019-04-08 RX ORDER — POTASSIUM CHLORIDE 20 MEQ/1
20 TABLET, EXTENDED RELEASE ORAL 2 TIMES DAILY
Qty: 180 TABLET | Refills: 3 | Status: SHIPPED | OUTPATIENT
Start: 2019-04-08 | End: 2020-03-26 | Stop reason: SDUPTHER

## 2019-04-08 RX ORDER — AMITRIPTYLINE HYDROCHLORIDE 50 MG/1
50 TABLET, FILM COATED ORAL NIGHTLY
Qty: 90 TABLET | Refills: 3 | Status: SHIPPED | OUTPATIENT
Start: 2019-04-08 | End: 2020-03-26 | Stop reason: SDUPTHER

## 2019-04-08 RX ORDER — METOPROLOL TARTRATE 50 MG/1
50 TABLET, FILM COATED ORAL 2 TIMES DAILY
Qty: 180 TABLET | Refills: 3 | Status: SHIPPED | OUTPATIENT
Start: 2019-04-08 | End: 2020-03-26 | Stop reason: SDUPTHER

## 2019-04-08 RX ORDER — METOPROLOL TARTRATE 50 MG/1
TABLET, FILM COATED ORAL
Qty: 180 TABLET | Refills: 3 | Status: SHIPPED | OUTPATIENT
Start: 2019-04-08 | End: 2019-09-30

## 2019-04-08 RX ORDER — AMLODIPINE BESYLATE 10 MG/1
10 TABLET ORAL DAILY
Qty: 30 TABLET | Refills: 5 | Status: SHIPPED | OUTPATIENT
Start: 2019-04-08 | End: 2019-10-26 | Stop reason: SDUPTHER

## 2019-04-08 RX ORDER — POTASSIUM CHLORIDE 1500 MG/1
TABLET, EXTENDED RELEASE ORAL
Qty: 180 TABLET | Refills: 3 | Status: SHIPPED | OUTPATIENT
Start: 2019-04-08 | End: 2020-05-07 | Stop reason: SDUPTHER

## 2019-04-08 RX ORDER — SPIRONOLACTONE 25 MG/1
25 TABLET ORAL DAILY
Qty: 90 TABLET | Refills: 3 | Status: SHIPPED | OUTPATIENT
Start: 2019-04-08 | End: 2020-04-23 | Stop reason: SDUPTHER

## 2019-06-27 ENCOUNTER — APPOINTMENT (OUTPATIENT)
Dept: LAB | Facility: HOSPITAL | Age: 54
End: 2019-06-27

## 2019-06-27 ENCOUNTER — TRANSCRIBE ORDERS (OUTPATIENT)
Dept: LAB | Facility: HOSPITAL | Age: 54
End: 2019-06-27

## 2019-06-27 DIAGNOSIS — N18.2 CHRONIC KIDNEY DISEASE, STAGE II (MILD): Primary | ICD-10-CM

## 2019-06-27 LAB
25(OH)D3 SERPL-MCNC: 21.7 NG/ML (ref 30–100)
ALBUMIN SERPL-MCNC: 4.2 G/DL (ref 3.5–5.2)
ANION GAP SERPL CALCULATED.3IONS-SCNC: 12.2 MMOL/L (ref 5–15)
BUN BLD-MCNC: 17 MG/DL (ref 6–20)
BUN/CREAT SERPL: 19.5 (ref 7–25)
CALCIUM SPEC-SCNC: 10.2 MG/DL (ref 8.6–10.5)
CHLORIDE SERPL-SCNC: 104 MMOL/L (ref 98–107)
CO2 SERPL-SCNC: 24.8 MMOL/L (ref 22–29)
CREAT BLD-MCNC: 0.87 MG/DL (ref 0.57–1)
CREAT UR-MCNC: 80.6 MG/DL
GFR SERPL CREATININE-BSD FRML MDRD: 68 ML/MIN/1.73
GLUCOSE BLD-MCNC: 75 MG/DL (ref 65–99)
PHOSPHATE SERPL-MCNC: 3.3 MG/DL (ref 2.5–4.5)
POTASSIUM BLD-SCNC: 4 MMOL/L (ref 3.5–5.2)
PROT UR-MCNC: 12 MG/DL
PROT/CREAT UR: 148.9 MG/G CREA (ref 0–200)
SODIUM BLD-SCNC: 141 MMOL/L (ref 136–145)

## 2019-06-27 PROCEDURE — 84156 ASSAY OF PROTEIN URINE: CPT | Performed by: INTERNAL MEDICINE

## 2019-06-27 PROCEDURE — 82570 ASSAY OF URINE CREATININE: CPT | Performed by: INTERNAL MEDICINE

## 2019-06-27 PROCEDURE — 82306 VITAMIN D 25 HYDROXY: CPT | Performed by: INTERNAL MEDICINE

## 2019-06-27 PROCEDURE — 80069 RENAL FUNCTION PANEL: CPT | Performed by: INTERNAL MEDICINE

## 2019-06-27 PROCEDURE — 36415 COLL VENOUS BLD VENIPUNCTURE: CPT | Performed by: INTERNAL MEDICINE

## 2019-07-01 ENCOUNTER — HOSPITAL ENCOUNTER (OUTPATIENT)
Dept: GENERAL RADIOLOGY | Facility: HOSPITAL | Age: 54
Discharge: HOME | End: 2019-07-01
Admitting: INTERNAL MEDICINE

## 2019-07-01 ENCOUNTER — HOSPITAL ENCOUNTER (OUTPATIENT)
Dept: GENERAL RADIOLOGY | Facility: HOSPITAL | Age: 54
Discharge: HOME | End: 2019-07-01

## 2019-07-01 DIAGNOSIS — M79.671 RIGHT FOOT PAIN: ICD-10-CM

## 2019-07-01 PROCEDURE — 73630 X-RAY EXAM OF FOOT: CPT

## 2019-07-01 PROCEDURE — 73610 X-RAY EXAM OF ANKLE: CPT

## 2019-07-02 ENCOUNTER — TRANSCRIBE ORDERS (OUTPATIENT)
Dept: LAB | Facility: HOSPITAL | Age: 54
End: 2019-07-02

## 2019-07-02 DIAGNOSIS — N18.2 CHRONIC KIDNEY DISEASE, STAGE II (MILD): Primary | ICD-10-CM

## 2019-09-30 ENCOUNTER — OFFICE VISIT (OUTPATIENT)
Dept: FAMILY MEDICINE CLINIC | Facility: CLINIC | Age: 54
End: 2019-09-30

## 2019-09-30 VITALS
HEIGHT: 63 IN | SYSTOLIC BLOOD PRESSURE: 136 MMHG | BODY MASS INDEX: 32.44 KG/M2 | DIASTOLIC BLOOD PRESSURE: 84 MMHG | WEIGHT: 183.1 LBS

## 2019-09-30 DIAGNOSIS — Z12.31 VISIT FOR SCREENING MAMMOGRAM: ICD-10-CM

## 2019-09-30 DIAGNOSIS — Z23 NEED FOR VACCINATION: ICD-10-CM

## 2019-09-30 DIAGNOSIS — F32.4 MAJOR DEPRESSIVE DISORDER WITH SINGLE EPISODE, IN PARTIAL REMISSION (HCC): Chronic | ICD-10-CM

## 2019-09-30 DIAGNOSIS — I10 ESSENTIAL HYPERTENSION: Primary | Chronic | ICD-10-CM

## 2019-09-30 PROCEDURE — 99213 OFFICE O/P EST LOW 20 MIN: CPT | Performed by: FAMILY MEDICINE

## 2019-09-30 PROCEDURE — 90674 CCIIV4 VAC NO PRSV 0.5 ML IM: CPT | Performed by: FAMILY MEDICINE

## 2019-09-30 PROCEDURE — 90471 IMMUNIZATION ADMIN: CPT | Performed by: FAMILY MEDICINE

## 2019-09-30 NOTE — PROGRESS NOTES
Subjective   Pati Sotelo is a 54 y.o. female.     History of Present Illness   valuation hypertension history of Peter-Parkinson-White history of colonic carcinoma obesity.  Interim is maintained weight has gained any had not lost any.  Last renal functions at request will return to normal limits.  Medicines have remained the same.  Is due for mammogram next month.  Pap smear next year.:  Per her specialist.  Shingles vaccine at a later date #2.  Amitriptyline hold on depression  HPI    The following portions of the patient's history were reviewed and updated as appropriate: allergies, current medications, past family history, past medical history, past social history, past surgical history and problem list.    Review of Systems  Review of Systems   Constitutional: Negative for activity change, appetite change, fatigue and unexpected weight change.   HENT: Negative for trouble swallowing and voice change.    Eyes: Negative for redness and visual disturbance.   Respiratory: Negative for cough and wheezing.    Cardiovascular: Negative for chest pain and palpitations.   Gastrointestinal: Negative for abdominal pain, constipation, diarrhea, nausea and vomiting.   Genitourinary: Negative for urgency.   Musculoskeletal: Negative for joint swelling.   Neurological: Negative for syncope and headaches.   Hematological: Negative for adenopathy.   Psychiatric/Behavioral: Negative for sleep disturbance.       Objective   Physical Exam  Physical Exam   Constitutional: She is oriented to person, place, and time. She appears well-developed.   HENT:   Head: Normocephalic.   Right Ear: External ear normal.   Nose: Nose normal.   Eyes: Pupils are equal, round, and reactive to light.   Neck: Normal range of motion. No thyromegaly present.   Cardiovascular: Normal rate, regular rhythm, normal heart sounds and intact distal pulses. Exam reveals no gallop and no friction rub.   No murmur heard.  Pulmonary/Chest: Breath sounds  "normal.   Abdominal: Soft. She exhibits no distension and no mass. There is no tenderness.   Musculoskeletal: Normal range of motion.   No peripheral edema 2+ pulses gait normal   Neurological: She is alert and oriented to person, place, and time. She has normal reflexes.   Skin: Skin is warm and dry.   Psychiatric: She has a normal mood and affect.         Visit Vitals  /84 (BP Location: Left arm, Patient Position: Sitting, Cuff Size: Large Adult)   Ht 160 cm (63\")   Wt 83.1 kg (183 lb 1.6 oz)   BMI 32.43 kg/m²     Body mass index is 32.43 kg/m².      Assessment/Plan   Pati was seen today for follow-up.    Diagnoses and all orders for this visit:    Essential hypertension    Major depressive disorder with single episode, in partial remission (CMS/HCC)    Need for vaccination  -     Flu Vaccine Quad PF 3YR+    Visit for screening mammogram  -     Mammo Screening Digital Tomosynthesis Bilateral With CAD     on wt loss measures and programs  Lifestyle measures discussed recheck 6 months  "

## 2019-10-26 DIAGNOSIS — I10 ESSENTIAL HYPERTENSION: ICD-10-CM

## 2019-10-28 RX ORDER — AMLODIPINE BESYLATE 10 MG/1
10 TABLET ORAL DAILY
Qty: 30 TABLET | Refills: 5 | Status: SHIPPED | OUTPATIENT
Start: 2019-10-28 | End: 2020-04-23 | Stop reason: SDUPTHER

## 2019-12-12 DIAGNOSIS — Z85.038 HISTORY OF COLON CANCER: Primary | ICD-10-CM

## 2019-12-18 ENCOUNTER — LAB (OUTPATIENT)
Dept: ONCOLOGY | Facility: HOSPITAL | Age: 54
End: 2019-12-18

## 2019-12-18 ENCOUNTER — OFFICE VISIT (OUTPATIENT)
Dept: ONCOLOGY | Facility: CLINIC | Age: 54
End: 2019-12-18

## 2019-12-18 VITALS
HEART RATE: 58 BPM | RESPIRATION RATE: 18 BRPM | SYSTOLIC BLOOD PRESSURE: 169 MMHG | TEMPERATURE: 97.6 F | HEIGHT: 63 IN | WEIGHT: 185.6 LBS | BODY MASS INDEX: 32.89 KG/M2 | DIASTOLIC BLOOD PRESSURE: 93 MMHG

## 2019-12-18 DIAGNOSIS — Z85.038 H/O COLON CANCER, STAGE II: Primary | Chronic | ICD-10-CM

## 2019-12-18 DIAGNOSIS — Z85.038 HISTORY OF COLON CANCER: ICD-10-CM

## 2019-12-18 LAB
ALBUMIN SERPL-MCNC: 4.2 G/DL (ref 3.5–5.2)
ALBUMIN/GLOB SERPL: 1.4 G/DL
ALP SERPL-CCNC: 109 U/L (ref 39–117)
ALT SERPL W P-5'-P-CCNC: 27 U/L (ref 1–33)
ANION GAP SERPL CALCULATED.3IONS-SCNC: 12 MMOL/L (ref 5–15)
AST SERPL-CCNC: 26 U/L (ref 1–32)
BASOPHILS # BLD AUTO: 0.04 10*3/MM3 (ref 0–0.2)
BASOPHILS NFR BLD AUTO: 0.7 % (ref 0–1.5)
BILIRUB SERPL-MCNC: 0.3 MG/DL (ref 0.2–1.2)
BUN BLD-MCNC: 11 MG/DL (ref 6–20)
BUN/CREAT SERPL: 12.5 (ref 7–25)
CALCIUM SPEC-SCNC: 9.9 MG/DL (ref 8.6–10.5)
CEA SERPL-MCNC: 1.57 NG/ML
CHLORIDE SERPL-SCNC: 106 MMOL/L (ref 98–107)
CO2 SERPL-SCNC: 24 MMOL/L (ref 22–29)
CREAT BLD-MCNC: 0.88 MG/DL (ref 0.57–1)
DEPRECATED RDW RBC AUTO: 37.5 FL (ref 37–54)
EOSINOPHIL # BLD AUTO: 0.17 10*3/MM3 (ref 0–0.4)
EOSINOPHIL NFR BLD AUTO: 2.9 % (ref 0.3–6.2)
ERYTHROCYTE [DISTWIDTH] IN BLOOD BY AUTOMATED COUNT: 12 % (ref 12.3–15.4)
GFR SERPL CREATININE-BSD FRML MDRD: 67 ML/MIN/1.73
GLOBULIN UR ELPH-MCNC: 2.9 GM/DL
GLUCOSE BLD-MCNC: 91 MG/DL (ref 65–99)
HCT VFR BLD AUTO: 40.7 % (ref 34–46.6)
HGB BLD-MCNC: 14.2 G/DL (ref 12–15.9)
IMM GRANULOCYTES # BLD AUTO: 0.02 10*3/MM3 (ref 0–0.05)
IMM GRANULOCYTES NFR BLD AUTO: 0.3 % (ref 0–0.5)
LYMPHOCYTES # BLD AUTO: 1.76 10*3/MM3 (ref 0.7–3.1)
LYMPHOCYTES NFR BLD AUTO: 30 % (ref 19.6–45.3)
MCH RBC QN AUTO: 29.9 PG (ref 26.6–33)
MCHC RBC AUTO-ENTMCNC: 34.9 G/DL (ref 31.5–35.7)
MCV RBC AUTO: 85.7 FL (ref 79–97)
MONOCYTES # BLD AUTO: 0.49 10*3/MM3 (ref 0.1–0.9)
MONOCYTES NFR BLD AUTO: 8.3 % (ref 5–12)
NEUTROPHILS # BLD AUTO: 3.39 10*3/MM3 (ref 1.7–7)
NEUTROPHILS NFR BLD AUTO: 57.8 % (ref 42.7–76)
NRBC BLD AUTO-RTO: 0 /100 WBC (ref 0–0.2)
PLATELET # BLD AUTO: 328 10*3/MM3 (ref 140–450)
PMV BLD AUTO: 9.8 FL (ref 6–12)
POTASSIUM BLD-SCNC: 4.3 MMOL/L (ref 3.5–5.2)
PROT SERPL-MCNC: 7.1 G/DL (ref 6–8.5)
RBC # BLD AUTO: 4.75 10*6/MM3 (ref 3.77–5.28)
SODIUM BLD-SCNC: 142 MMOL/L (ref 136–145)
WBC NRBC COR # BLD: 5.87 10*3/MM3 (ref 3.4–10.8)

## 2019-12-18 PROCEDURE — 80053 COMPREHEN METABOLIC PANEL: CPT | Performed by: NURSE PRACTITIONER

## 2019-12-18 PROCEDURE — 82378 CARCINOEMBRYONIC ANTIGEN: CPT | Performed by: NURSE PRACTITIONER

## 2019-12-18 PROCEDURE — 85025 COMPLETE CBC W/AUTO DIFF WBC: CPT | Performed by: NURSE PRACTITIONER

## 2019-12-18 PROCEDURE — 99213 OFFICE O/P EST LOW 20 MIN: CPT | Performed by: NURSE PRACTITIONER

## 2019-12-18 PROCEDURE — G0463 HOSPITAL OUTPT CLINIC VISIT: HCPCS | Performed by: NURSE PRACTITIONER

## 2019-12-18 NOTE — PROGRESS NOTES
DATE OF VISIT: 12/18/2019    REASON FOR VISIT:  Yearly follow-up for Stage II colon cancer    HISTORY OF PRESENT ILLNESS:  54 yr old female diagnosed with Stage II colon cancer in 2009, [id number]. She completed 6 months of Xeloda chemotherapy in the adjuvant setting.She is being followed yearly. She denies any changes with her bowels, denies any blood in stools. Her last colonoscopy was in 2016; due for repeat in 2021.       PAST MEDICAL HISTORY:    Past Medical History:   Diagnosis Date   • Abdominal pain     lower, hypo gastrium, s/p surgery      • Anomalous atrioventricular excitation    • C. difficile colitis     Hx   • Colon cancer (CMS/HCC)    • Conduction disorder of the heart      Supraventricular      • Depression    • Drug therapy    • Encounter for screening for malignant neoplasm of colon    • Essential hypertension    • Family history of malignant neoplasm of digestive organ    • History of colon polyps    • History of malignant neoplasm of colon     s/p resection in 2009 and chemo   • History of malignant neoplasm of gastrointestinal tract    • Hyperaldosteronism (CMS/HCC)    • Hyperplastic polyp of large intestine    • Hypertensive disorder    • Hypokalemia    • Intramural leiomyoma of uterus    • 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     Folate and B12 ordered      • Menstruation disorder     abnormal bleeding from female genital tract      • MRSA infection     Hx   • Screening for malignant neoplasm of breast    • Vitamin D deficiency    • Peter-Parkinson-White (WPW) pattern        SOCIAL HISTORY:    Social History     Tobacco Use   • Smoking status: Never Smoker   • Smokeless tobacco: Never Used   Substance Use Topics   • Alcohol use: No   • Drug use: No       Surgical History :  Past Surgical History:   Procedure Laterality Date   • CARDIAC ABLATION     • CARDIAC CATHETERIZATION  07/02/2007    No significant coronary artery disease with normal left ventricular function   • COLECTOMY PARTIAL / TOTAL   "12/23/2009    Mid sigmoid obstructing colon cancer   • ECG CONVERTED  09/29/2008    Sinus rhythm with short AL. Minimal voltage criteria for LVH, may be normal variant T wave abnormality, consider inferolateral ischemia. Prolonged QT Abnormal ECG   • ENDOSCOPY AND COLONOSCOPY  06/27/2016    External and internal hemorrhoids. The examination was otherwise normal. No specimens collected   • HERNIA REPAIR     • TUBAL ABDOMINAL LIGATION         ALLERGIES:    Allergies   Allergen Reactions   • Tums [Calcium Carbonate Antacid] Anaphylaxis   • Sulfa Antibiotics Rash       REVIEW OF SYSTEMS:      CONSTITUTIONAL:  No fever, chills, or night sweats.     HEENT:  No epistaxis, mouth sores, or difficulty swallowing.    RESPIRATORY:  No new shortness of breath or cough at present.    CARDIOVASCULAR:  No chest pain or palpitations.    GASTROINTESTINAL:  No abdominal pain, nausea, vomiting, or blood in the stool.    GENITOURINARY:  No dysuria or hematuria.    MUSCULOSKELETAL:  No any new back pain or arthralgias.     NEUROLOGICAL:  No tingling or numbness. No new headache or dizziness.     LYMPHATICS:  Denies any abnormal swollen and anywhere in the body.    SKIN:  Denies any new skin rash.    PHYSICAL EXAMINATION:      VITAL SIGNS:  /93   Pulse 58   Temp 97.6 °F (36.4 °C) (Temporal)   Resp 18   Ht 160 cm (62.99\")   Wt 84.2 kg (185 lb 9.6 oz)   BMI 32.89 kg/m²     GENERAL:  Not in any distress.    HEENT:  Normocephalic, Atraumatic.Mild Conjunctival pallor. No icterus. Extraocular Movements Intact. No Facial Asymmetry noted.    NECK:  No adenopathy. No JVD.    RESPIRATORY:  Fair air entry bilateral. No rhonchi or wheezing.    CARDIOVASCULAR:  S1, S2. Regular rate and rhythm. No murmur or gallop appreciated.    ABDOMEN:  Soft, obese, nontender. Bowel sounds present in all four quadrants.  No organomegaly appreciated.    EXTREMITIES:  No edema.No Calf Tenderness.    NEUROLOGIC:  Alert, awake and oriented ×3.  No  Motor or " sensory deficit appreciated. Cranial Nerves 2-12 grossly intact.    SKIN : No new skin lesion identified  DIAGNOSTIC DATA:    Glucose   Date Value Ref Range Status   12/18/2019 91 65 - 99 mg/dL Final     Sodium   Date Value Ref Range Status   12/18/2019 142 136 - 145 mmol/L Final     Potassium   Date Value Ref Range Status   12/18/2019 4.3 3.5 - 5.2 mmol/L Final     CO2   Date Value Ref Range Status   12/18/2019 24.0 22.0 - 29.0 mmol/L Final     Chloride   Date Value Ref Range Status   12/18/2019 106 98 - 107 mmol/L Final     Anion Gap   Date Value Ref Range Status   12/18/2019 12.0 5.0 - 15.0 mmol/L Final     Creatinine   Date Value Ref Range Status   12/18/2019 0.88 0.57 - 1.00 mg/dL Final     BUN   Date Value Ref Range Status   12/18/2019 11 6 - 20 mg/dL Final     BUN/Creatinine Ratio   Date Value Ref Range Status   12/18/2019 12.5 7.0 - 25.0 Final     Calcium   Date Value Ref Range Status   12/18/2019 9.9 8.6 - 10.5 mg/dL Final     eGFR Non  Amer   Date Value Ref Range Status   12/18/2019 67 >60 mL/min/1.73 Final     Alkaline Phosphatase   Date Value Ref Range Status   12/18/2019 109 39 - 117 U/L Final     Total Protein   Date Value Ref Range Status   12/18/2019 7.1 6.0 - 8.5 g/dL Final     ALT (SGPT)   Date Value Ref Range Status   12/18/2019 27 1 - 33 U/L Final     AST (SGOT)   Date Value Ref Range Status   12/18/2019 26 1 - 32 U/L Final     Total Bilirubin   Date Value Ref Range Status   12/18/2019 0.3 0.2 - 1.2 mg/dL Final     Albumin   Date Value Ref Range Status   12/18/2019 4.20 3.50 - 5.20 g/dL Final     Globulin   Date Value Ref Range Status   12/18/2019 2.9 gm/dL Final     Lab Results   Component Value Date    WBC 5.87 12/18/2019    HGB 14.2 12/18/2019    HCT 40.7 12/18/2019    MCV 85.7 12/18/2019     12/18/2019     Lab Results   Component Value Date    NEUTROABS 3.39 12/18/2019     Lab Results   Component Value Date    CEA 0.9 12/04/2015   ]          ASSESSMENT AND PLAN:    1. Stage II  colon cancer, diagnosed in 2009, status post surgery and 6 months adjuvant Xeloda chemotherapy. She had a colonoscopy in 2016. She is on clinical surveillance at this point. Will return to clinic in one yr with repeat labs.     2. Health maintenance, she does not smoke, she has yearly mammograms.     3. Hypertension, BP today is 169/93    Patient's Body mass index is 32.89 kg/m². BMI is above normal parameters. Recommendations include: exercise counseling and nutrition counseling.               This document has been signed by YONY Vazquez on December 18, 2019 2:41 PM

## 2020-03-26 DIAGNOSIS — I10 ESSENTIAL HYPERTENSION: ICD-10-CM

## 2020-03-27 RX ORDER — AMITRIPTYLINE HYDROCHLORIDE 50 MG/1
50 TABLET, FILM COATED ORAL NIGHTLY
Qty: 90 TABLET | Refills: 3 | Status: SHIPPED | OUTPATIENT
Start: 2020-03-27 | End: 2021-03-15 | Stop reason: SDUPTHER

## 2020-03-27 RX ORDER — POTASSIUM CHLORIDE 20 MEQ/1
20 TABLET, EXTENDED RELEASE ORAL 2 TIMES DAILY
Qty: 180 TABLET | Refills: 3 | Status: SHIPPED | OUTPATIENT
Start: 2020-03-27 | End: 2020-05-07

## 2020-03-27 RX ORDER — METOPROLOL TARTRATE 50 MG/1
50 TABLET, FILM COATED ORAL 2 TIMES DAILY
Qty: 180 TABLET | Refills: 3 | Status: SHIPPED | OUTPATIENT
Start: 2020-03-27 | End: 2021-03-15 | Stop reason: SDUPTHER

## 2020-04-23 DIAGNOSIS — I10 ESSENTIAL HYPERTENSION: ICD-10-CM

## 2020-04-24 RX ORDER — SPIRONOLACTONE 25 MG/1
25 TABLET ORAL DAILY
Qty: 90 TABLET | Refills: 0 | Status: SHIPPED | OUTPATIENT
Start: 2020-04-24 | End: 2020-05-07 | Stop reason: SDUPTHER

## 2020-04-24 RX ORDER — AMLODIPINE BESYLATE 10 MG/1
10 TABLET ORAL DAILY
Qty: 30 TABLET | Refills: 0 | Status: SHIPPED | OUTPATIENT
Start: 2020-04-24 | End: 2020-05-07 | Stop reason: SDUPTHER

## 2020-04-24 RX ORDER — LISINOPRIL 40 MG/1
40 TABLET ORAL DAILY
Qty: 90 TABLET | Refills: 0 | Status: SHIPPED | OUTPATIENT
Start: 2020-04-24 | End: 2020-05-07 | Stop reason: SDUPTHER

## 2020-05-07 ENCOUNTER — OFFICE VISIT (OUTPATIENT)
Dept: FAMILY MEDICINE CLINIC | Facility: CLINIC | Age: 55
End: 2020-05-07

## 2020-05-07 VITALS
DIASTOLIC BLOOD PRESSURE: 84 MMHG | SYSTOLIC BLOOD PRESSURE: 126 MMHG | WEIGHT: 186.2 LBS | HEIGHT: 62 IN | BODY MASS INDEX: 34.27 KG/M2

## 2020-05-07 DIAGNOSIS — I10 ESSENTIAL HYPERTENSION: Primary | Chronic | ICD-10-CM

## 2020-05-07 DIAGNOSIS — Z23 NEED FOR VACCINATION: ICD-10-CM

## 2020-05-07 DIAGNOSIS — Z85.038 H/O COLON CANCER, STAGE II: Chronic | ICD-10-CM

## 2020-05-07 DIAGNOSIS — F32.4 MAJOR DEPRESSIVE DISORDER WITH SINGLE EPISODE, IN PARTIAL REMISSION (HCC): Chronic | ICD-10-CM

## 2020-05-07 DIAGNOSIS — I45.6 WOLFF-PARKINSON-WHITE (WPW) PATTERN: Chronic | ICD-10-CM

## 2020-05-07 DIAGNOSIS — Z13.220 SCREENING CHOLESTEROL LEVEL: ICD-10-CM

## 2020-05-07 PROCEDURE — 90471 IMMUNIZATION ADMIN: CPT | Performed by: FAMILY MEDICINE

## 2020-05-07 PROCEDURE — 90750 HZV VACC RECOMBINANT IM: CPT | Performed by: FAMILY MEDICINE

## 2020-05-07 PROCEDURE — 99213 OFFICE O/P EST LOW 20 MIN: CPT | Performed by: FAMILY MEDICINE

## 2020-05-07 RX ORDER — POTASSIUM CHLORIDE 20 MEQ/1
20 TABLET, EXTENDED RELEASE ORAL 2 TIMES DAILY
Qty: 180 TABLET | Refills: 3 | Status: SHIPPED | OUTPATIENT
Start: 2020-05-07 | End: 2021-06-14 | Stop reason: SDUPTHER

## 2020-05-07 RX ORDER — AMLODIPINE BESYLATE 10 MG/1
10 TABLET ORAL DAILY
Qty: 90 TABLET | Refills: 3 | Status: SHIPPED | OUTPATIENT
Start: 2020-05-07 | End: 2021-04-11 | Stop reason: SDUPTHER

## 2020-05-07 RX ORDER — SPIRONOLACTONE 25 MG/1
25 TABLET ORAL DAILY
Qty: 90 TABLET | Refills: 3 | Status: SHIPPED | OUTPATIENT
Start: 2020-05-07 | End: 2021-07-18 | Stop reason: SDUPTHER

## 2020-05-07 RX ORDER — LISINOPRIL 40 MG/1
40 TABLET ORAL DAILY
Qty: 90 TABLET | Refills: 3 | Status: SHIPPED | OUTPATIENT
Start: 2020-05-07 | End: 2021-07-18 | Stop reason: SDUPTHER

## 2020-05-07 NOTE — PROGRESS NOTES
Subjective   Pati Sotelo is a 54 y.o. female.  Reevaluation hypertension obesity history of colon carcinoma history of WPW.  In the interim is working trying to exercise enforces since being at home and eating is gained little weight blood pressures holding however.  Lab work done per cancer center is within normal limits.  Is due for second and final shingles vaccine today.  Feels well otherwise.    History of Present Illness   HPI    The following portions of the patient's history were reviewed and updated as appropriate: allergies, current medications, past family history, past medical history, past social history, past surgical history and problem list.    Review of Systems  Review of Systems   Constitutional: Positive for unexpected weight change (As above).       Objective   Physical Exam  Physical Exam   Constitutional: She is oriented to person, place, and time. She appears well-developed.   HENT:   Head: Normocephalic.   Eyes: Pupils are equal, round, and reactive to light.   Neck: Normal range of motion. No thyromegaly present.   Cardiovascular: Normal rate, regular rhythm, normal heart sounds and intact distal pulses. Exam reveals no gallop and no friction rub.   No murmur heard.  Pulmonary/Chest: Breath sounds normal.   Abdominal: Soft. She exhibits no distension and no mass. There is no tenderness.   Musculoskeletal: Normal range of motion.   No peripheral edema   Neurological: She is alert and oriented to person, place, and time. She has normal reflexes.   Skin: Skin is warm and dry.   Psychiatric: She has a normal mood and affect. Her speech is normal and behavior is normal.   Normal affect     Results for orders placed or performed in visit on 12/18/19   Comprehensive Metabolic Panel   Result Value Ref Range    Glucose 91 65 - 99 mg/dL    BUN 11 6 - 20 mg/dL    Creatinine 0.88 0.57 - 1.00 mg/dL    Sodium 142 136 - 145 mmol/L    Potassium 4.3 3.5 - 5.2 mmol/L    Chloride 106 98 - 107 mmol/L     "CO2 24.0 22.0 - 29.0 mmol/L    Calcium 9.9 8.6 - 10.5 mg/dL    Total Protein 7.1 6.0 - 8.5 g/dL    Albumin 4.20 3.50 - 5.20 g/dL    ALT (SGPT) 27 1 - 33 U/L    AST (SGOT) 26 1 - 32 U/L    Alkaline Phosphatase 109 39 - 117 U/L    Total Bilirubin 0.3 0.2 - 1.2 mg/dL    eGFR Non African Amer 67 >60 mL/min/1.73    Globulin 2.9 gm/dL    A/G Ratio 1.4 g/dL    BUN/Creatinine Ratio 12.5 7.0 - 25.0    Anion Gap 12.0 5.0 - 15.0 mmol/L   CEA   Result Value Ref Range    CEA 1.57 ng/mL   CBC Auto Differential   Result Value Ref Range    WBC 5.87 3.40 - 10.80 10*3/mm3    RBC 4.75 3.77 - 5.28 10*6/mm3    Hemoglobin 14.2 12.0 - 15.9 g/dL    Hematocrit 40.7 34.0 - 46.6 %    MCV 85.7 79.0 - 97.0 fL    MCH 29.9 26.6 - 33.0 pg    MCHC 34.9 31.5 - 35.7 g/dL    RDW 12.0 (L) 12.3 - 15.4 %    RDW-SD 37.5 37.0 - 54.0 fl    MPV 9.8 6.0 - 12.0 fL    Platelets 328 140 - 450 10*3/mm3    Neutrophil % 57.8 42.7 - 76.0 %    Lymphocyte % 30.0 19.6 - 45.3 %    Monocyte % 8.3 5.0 - 12.0 %    Eosinophil % 2.9 0.3 - 6.2 %    Basophil % 0.7 0.0 - 1.5 %    Immature Grans % 0.3 0.0 - 0.5 %    Neutrophils, Absolute 3.39 1.70 - 7.00 10*3/mm3    Lymphocytes, Absolute 1.76 0.70 - 3.10 10*3/mm3    Monocytes, Absolute 0.49 0.10 - 0.90 10*3/mm3    Eosinophils, Absolute 0.17 0.00 - 0.40 10*3/mm3    Basophils, Absolute 0.04 0.00 - 0.20 10*3/mm3    Immature Grans, Absolute 0.02 0.00 - 0.05 10*3/mm3    nRBC 0.0 0.0 - 0.2 /100 WBC           Visit Vitals  /84   Ht 157.5 cm (62\")   Wt 84.5 kg (186 lb 3.2 oz)   BMI 34.06 kg/m²     Body mass index is 34.06 kg/m².      Assessment/Plan   Pait was seen today for hypertension.    Diagnoses and all orders for this visit:    Essential hypertension  -     potassium chloride (KLOR-CON) 20 MEQ CR tablet; Take 1 tablet by mouth 2 (Two) Times a Day.  -     amLODIPine (NORVASC) 10 MG tablet; Take 1 tablet by mouth Daily.  -     spironolactone (ALDACTONE) 25 MG tablet; Take 1 tablet by mouth Daily.  -     lisinopril " (PRINIVIL,ZESTRIL) 40 MG tablet; Take 1 tablet by mouth Daily.    Peter-Parkinson-White (WPW) pattern    BMI 31.0-31.9,adult    H/O colon cancer, stage II    Major depressive disorder with single episode, in partial remission (CMS/HCC)    Need for vaccination  -     Shingrix Vaccine    Screening cholesterol level  -     Lipid Panel With LDL / HDL Ratio; Future     on wt loss measures and programs  Infection prevention continue lifestyle changes recheck again in 6 months with lab prior will be time for Pap smear

## 2020-06-23 ENCOUNTER — TRANSCRIBE ORDERS (OUTPATIENT)
Dept: LAB | Facility: HOSPITAL | Age: 55
End: 2020-06-23

## 2020-06-23 ENCOUNTER — LAB (OUTPATIENT)
Dept: LAB | Facility: HOSPITAL | Age: 55
End: 2020-06-23

## 2020-06-23 DIAGNOSIS — E87.6 HYPOKALEMIC SYNDROME: Primary | ICD-10-CM

## 2020-06-23 DIAGNOSIS — E87.6 HYPOKALEMIA: ICD-10-CM

## 2020-06-23 DIAGNOSIS — E87.6 HYPOKALEMIA: Primary | ICD-10-CM

## 2020-06-23 LAB
25(OH)D3 SERPL-MCNC: 31.9 NG/ML (ref 30–100)
ANION GAP SERPL CALCULATED.3IONS-SCNC: 13.4 MMOL/L (ref 5–15)
BUN BLD-MCNC: 10 MG/DL (ref 6–20)
BUN/CREAT SERPL: 12.2 (ref 7–25)
CALCIUM SPEC-SCNC: 9.9 MG/DL (ref 8.6–10.5)
CHLORIDE SERPL-SCNC: 105 MMOL/L (ref 98–107)
CO2 SERPL-SCNC: 21.6 MMOL/L (ref 22–29)
CREAT BLD-MCNC: 0.82 MG/DL (ref 0.57–1)
CREAT UR-MCNC: 34 MG/DL
GFR SERPL CREATININE-BSD FRML MDRD: 72 ML/MIN/1.73
GLUCOSE BLD-MCNC: 84 MG/DL (ref 65–99)
POTASSIUM BLD-SCNC: 4.5 MMOL/L (ref 3.5–5.2)
POTASSIUM UR-SCNC: 27.3 MMOL/L
SODIUM BLD-SCNC: 140 MMOL/L (ref 136–145)
SODIUM UR-SCNC: <20 MMOL/L

## 2020-06-23 PROCEDURE — 82306 VITAMIN D 25 HYDROXY: CPT | Performed by: INTERNAL MEDICINE

## 2020-06-23 PROCEDURE — 84300 ASSAY OF URINE SODIUM: CPT

## 2020-06-23 PROCEDURE — 84133 ASSAY OF URINE POTASSIUM: CPT | Performed by: INTERNAL MEDICINE

## 2020-06-23 PROCEDURE — 36415 COLL VENOUS BLD VENIPUNCTURE: CPT | Performed by: INTERNAL MEDICINE

## 2020-06-23 PROCEDURE — 80048 BASIC METABOLIC PNL TOTAL CA: CPT | Performed by: INTERNAL MEDICINE

## 2020-06-23 PROCEDURE — 82570 ASSAY OF URINE CREATININE: CPT | Performed by: INTERNAL MEDICINE

## 2020-07-07 ENCOUNTER — TRANSCRIBE ORDERS (OUTPATIENT)
Dept: LAB | Facility: HOSPITAL | Age: 55
End: 2020-07-07

## 2020-07-07 DIAGNOSIS — E87.6 HYPOKALEMIA: Primary | ICD-10-CM

## 2020-11-09 ENCOUNTER — OFFICE VISIT (OUTPATIENT)
Dept: FAMILY MEDICINE CLINIC | Facility: CLINIC | Age: 55
End: 2020-11-09

## 2020-11-09 VITALS
WEIGHT: 183.5 LBS | SYSTOLIC BLOOD PRESSURE: 136 MMHG | HEIGHT: 63 IN | DIASTOLIC BLOOD PRESSURE: 82 MMHG | BODY MASS INDEX: 32.51 KG/M2

## 2020-11-09 DIAGNOSIS — Z12.31 BREAST CANCER SCREENING BY MAMMOGRAM: ICD-10-CM

## 2020-11-09 DIAGNOSIS — I10 ESSENTIAL HYPERTENSION: Primary | Chronic | ICD-10-CM

## 2020-11-09 DIAGNOSIS — Z23 NEED FOR IMMUNIZATION AGAINST INFLUENZA: ICD-10-CM

## 2020-11-09 DIAGNOSIS — S01.81XD LACERATION OF FOREHEAD, SUBSEQUENT ENCOUNTER: ICD-10-CM

## 2020-11-09 DIAGNOSIS — E66.09 CLASS 1 OBESITY DUE TO EXCESS CALORIES WITH SERIOUS COMORBIDITY AND BODY MASS INDEX (BMI) OF 32.0 TO 32.9 IN ADULT: ICD-10-CM

## 2020-11-09 DIAGNOSIS — I45.6 WOLFF-PARKINSON-WHITE (WPW) PATTERN: Chronic | ICD-10-CM

## 2020-11-09 DIAGNOSIS — Z01.419 WELL WOMAN EXAM: ICD-10-CM

## 2020-11-09 PROCEDURE — 99213 OFFICE O/P EST LOW 20 MIN: CPT | Performed by: FAMILY MEDICINE

## 2020-11-09 PROCEDURE — 90686 IIV4 VACC NO PRSV 0.5 ML IM: CPT | Performed by: FAMILY MEDICINE

## 2020-11-09 PROCEDURE — 90471 IMMUNIZATION ADMIN: CPT | Performed by: FAMILY MEDICINE

## 2020-11-09 PROCEDURE — 99396 PREV VISIT EST AGE 40-64: CPT | Performed by: FAMILY MEDICINE

## 2020-11-09 NOTE — PROGRESS NOTES
Subjective   Pati Sotelo is a 55 y.o. female.  Well woman exam.  Patient is both postmenopausal x2 to 3 years.  Is in need of Pap smear and mammogram.  Previous well woman exam parameters noted.  Does need flu vaccine as well.    History of Present Illness     The following portions of the patient's history were reviewed and updated as appropriate: allergies, current medications, past family history, past medical history, past social history, past surgical history and problem list.    Review of Systems   Constitutional: Negative for activity change, appetite change, fatigue and unexpected weight change.   HENT: Negative for trouble swallowing and voice change.    Eyes: Negative for redness and visual disturbance.   Respiratory: Negative for cough and wheezing.    Cardiovascular: Negative for chest pain and palpitations.   Gastrointestinal: Negative for abdominal pain, constipation, diarrhea, nausea and vomiting.   Genitourinary: Negative for urgency.   Musculoskeletal: Negative for joint swelling.   Neurological: Negative for syncope and headaches.   Hematological: Negative for adenopathy.   Psychiatric/Behavioral: Negative for sleep disturbance.       Objective   Physical Exam  Constitutional:       Appearance: She is well-developed.   HENT:      Head: Normocephalic.   Eyes:      Pupils: Pupils are equal, round, and reactive to light.   Neck:      Musculoskeletal: Normal range of motion.      Thyroid: No thyromegaly.   Cardiovascular:      Rate and Rhythm: Normal rate and regular rhythm.      Heart sounds: Normal heart sounds. No murmur. No friction rub. No gallop.    Pulmonary:      Breath sounds: Normal breath sounds.   Abdominal:      General: There is no distension.      Palpations: Abdomen is soft. There is no mass.      Tenderness: There is no abdominal tenderness.      Hernia: There is no hernia in the left inguinal area or right inguinal area.   Genitourinary:     Exam position: Lithotomy position.       Pubic Area: No rash or pubic lice.       Labia:         Right: No rash, tenderness, lesion or injury.         Left: No rash, tenderness, lesion or injury.       Urethra: No prolapse.      Comments: Elongated cervix.  No obvious lesion.  Pap smear obtained no gross mass bimanual palpation  Musculoskeletal: Normal range of motion.   Skin:     General: Skin is warm and dry.   Neurological:      Mental Status: She is alert and oriented to person, place, and time.      Deep Tendon Reflexes: Reflexes are normal and symmetric.   Psychiatric:         Attention and Perception: Attention normal.         Mood and Affect: Mood normal.         Speech: Speech normal.         Assessment/Plan   Diagnoses and all orders for this visit:    1. Essential hypertension (Primary)    2. Peter-Parkinson-White (WPW) pattern    3. Well woman exam  -     Mammo Screening Digital Tomosynthesis Bilateral With CAD  -     Liquid-based Pap Smear, Screening    4. Breast cancer screening by mammogram  -     Mammo Screening Digital Tomosynthesis Bilateral With CAD    5. Need for immunization against influenza  -     Fluarix/Fluzone/Afluria Quad>6 Months    6. Class 1 obesity due to excess calories with serious comorbidity and body mass index (BMI) of 32.0 to 32.9 in adult    7. Laceration of forehead, subsequent encounter          Mammogram Pap smear ordered counseled on Paps every 3 years till age 65 mammogram every 1 to 2 years breast self-exam discussed.  Bone density done by age 60.  Recheck same

## 2020-11-09 NOTE — PROGRESS NOTES
Subjective   Pati Sotelo is a 55 y.o. female.  Reevaluation hypertension WPW mild obesity diagnoses below.  Interim last lab work is acceptable cholesterol within normal limits.'s had a fall laceration forehead the stitches are now not red to be out yet.  Other than that patient feels well.    History of Present Illness   HPI    The following portions of the patient's history were reviewed and updated as appropriate: allergies, current medications, past family history, past medical history, past social history, past surgical history and problem list.    Review of Systems  Review of Systems   Constitutional: Negative for activity change, appetite change, fatigue and unexpected weight change.   HENT: Negative for trouble swallowing and voice change.    Eyes: Negative for redness and visual disturbance.   Respiratory: Negative for cough and wheezing.    Cardiovascular: Negative for chest pain and palpitations.   Gastrointestinal: Negative for abdominal pain, constipation, diarrhea, nausea and vomiting.   Genitourinary: Negative for urgency.   Musculoskeletal: Negative for joint swelling.   Skin: Positive for wound.   Neurological: Negative for syncope and headaches.   Hematological: Negative for adenopathy.   Psychiatric/Behavioral: Negative for sleep disturbance.       Objective   Physical Exam  Physical Exam  Constitutional:       Appearance: She is well-developed.   HENT:      Head: Normocephalic.   Eyes:      Pupils: Pupils are equal, round, and reactive to light.   Neck:      Musculoskeletal: Normal range of motion.      Thyroid: No thyromegaly.   Cardiovascular:      Rate and Rhythm: Normal rate and regular rhythm.      Heart sounds: Normal heart sounds. No murmur. No friction rub. No gallop.    Pulmonary:      Breath sounds: Normal breath sounds.   Abdominal:      General: There is no distension.      Palpations: Abdomen is soft. There is no mass.      Tenderness: There is no abdominal tenderness.  "  Musculoskeletal: Normal range of motion.   Skin:     General: Skin is warm and dry.      Comments: Upper forehead middle horizontal laceration least 3-1/2 cm a stitch is gone another 2 to 3 days   Neurological:      Mental Status: She is alert and oriented to person, place, and time.      Deep Tendon Reflexes: Reflexes are normal and symmetric.       Results for orders placed or performed in visit on 06/23/20   Sodium, Urine, Random - Urine, Clean Catch    Specimen: Urine, Clean Catch   Result Value Ref Range    Sodium, Urine <20 mmol/L           Visit Vitals  /82   Ht 160 cm (63\")   Wt 83.2 kg (183 lb 8 oz)   BMI 32.51 kg/m²     Body mass index is 32.51 kg/m².      Assessment/Plan   Diagnoses and all orders for this visit:    1. Essential hypertension (Primary)    2. Peter-Parkinson-White (WPW) pattern    3. Well woman exam  -     Mammo Screening Digital Tomosynthesis Bilateral With CAD  -     Liquid-based Pap Smear, Screening    4. Breast cancer screening by mammogram  -     Mammo Screening Digital Tomosynthesis Bilateral With CAD    5. Need for immunization against influenza  -     Fluarix/Fluzone/Afluria Quad>6 Months    6. Class 1 obesity due to excess calories with serious comorbidity and body mass index (BMI) of 32.0 to 32.9 in adult    7. Laceration of forehead, subsequent encounter     on wt loss measures and programs  Counseled mainly on lifestyle measures infection prevention wound care continue medicines as outlined keep all follow-ups recheck 6 months  "

## 2020-11-13 LAB
LAB AP CASE REPORT: NORMAL
PATH INTERP SPEC-IMP: NORMAL

## 2020-12-11 ENCOUNTER — LAB (OUTPATIENT)
Dept: LAB | Facility: HOSPITAL | Age: 55
End: 2020-12-11

## 2020-12-11 DIAGNOSIS — Z85.038 H/O COLON CANCER, STAGE II: ICD-10-CM

## 2020-12-11 LAB
ALBUMIN SERPL-MCNC: 4.1 G/DL (ref 3.5–5.2)
ALBUMIN/GLOB SERPL: 1.6 G/DL
ALP SERPL-CCNC: 97 U/L (ref 39–117)
ALT SERPL W P-5'-P-CCNC: 31 U/L (ref 1–33)
ANION GAP SERPL CALCULATED.3IONS-SCNC: 9 MMOL/L (ref 5–15)
AST SERPL-CCNC: 32 U/L (ref 1–32)
BASOPHILS # BLD AUTO: 0.05 10*3/MM3 (ref 0–0.2)
BASOPHILS NFR BLD AUTO: 0.8 % (ref 0–1.5)
BILIRUB SERPL-MCNC: 0.4 MG/DL (ref 0–1.2)
BUN SERPL-MCNC: 13 MG/DL (ref 6–20)
BUN/CREAT SERPL: 12.3 (ref 7–25)
CALCIUM SPEC-SCNC: 9.9 MG/DL (ref 8.6–10.5)
CEA SERPL-MCNC: 1.41 NG/ML
CHLORIDE SERPL-SCNC: 105 MMOL/L (ref 98–107)
CO2 SERPL-SCNC: 24 MMOL/L (ref 22–29)
CREAT SERPL-MCNC: 1.06 MG/DL (ref 0.57–1)
DEPRECATED RDW RBC AUTO: 38.3 FL (ref 37–54)
EOSINOPHIL # BLD AUTO: 0.11 10*3/MM3 (ref 0–0.4)
EOSINOPHIL NFR BLD AUTO: 1.8 % (ref 0.3–6.2)
ERYTHROCYTE [DISTWIDTH] IN BLOOD BY AUTOMATED COUNT: 12 % (ref 12.3–15.4)
GFR SERPL CREATININE-BSD FRML MDRD: 54 ML/MIN/1.73
GLOBULIN UR ELPH-MCNC: 2.6 GM/DL
GLUCOSE SERPL-MCNC: 96 MG/DL (ref 65–99)
HCT VFR BLD AUTO: 43.1 % (ref 34–46.6)
HGB BLD-MCNC: 14.7 G/DL (ref 12–15.9)
IMM GRANULOCYTES # BLD AUTO: 0.02 10*3/MM3 (ref 0–0.05)
IMM GRANULOCYTES NFR BLD AUTO: 0.3 % (ref 0–0.5)
LYMPHOCYTES # BLD AUTO: 1.47 10*3/MM3 (ref 0.7–3.1)
LYMPHOCYTES NFR BLD AUTO: 24.1 % (ref 19.6–45.3)
MCH RBC QN AUTO: 29.8 PG (ref 26.6–33)
MCHC RBC AUTO-ENTMCNC: 34.1 G/DL (ref 31.5–35.7)
MCV RBC AUTO: 87.2 FL (ref 79–97)
MONOCYTES # BLD AUTO: 0.45 10*3/MM3 (ref 0.1–0.9)
MONOCYTES NFR BLD AUTO: 7.4 % (ref 5–12)
NEUTROPHILS NFR BLD AUTO: 3.99 10*3/MM3 (ref 1.7–7)
NEUTROPHILS NFR BLD AUTO: 65.6 % (ref 42.7–76)
NRBC BLD AUTO-RTO: 0 /100 WBC (ref 0–0.2)
PLATELET # BLD AUTO: 350 10*3/MM3 (ref 140–450)
PMV BLD AUTO: 10.1 FL (ref 6–12)
POTASSIUM SERPL-SCNC: 4.6 MMOL/L (ref 3.5–5.2)
PROT SERPL-MCNC: 6.7 G/DL (ref 6–8.5)
RBC # BLD AUTO: 4.94 10*6/MM3 (ref 3.77–5.28)
SODIUM SERPL-SCNC: 138 MMOL/L (ref 136–145)
WBC # BLD AUTO: 6.09 10*3/MM3 (ref 3.4–10.8)

## 2020-12-11 PROCEDURE — 82378 CARCINOEMBRYONIC ANTIGEN: CPT

## 2020-12-11 PROCEDURE — 85025 COMPLETE CBC W/AUTO DIFF WBC: CPT

## 2020-12-11 PROCEDURE — 80053 COMPREHEN METABOLIC PANEL: CPT

## 2020-12-11 PROCEDURE — 36415 COLL VENOUS BLD VENIPUNCTURE: CPT

## 2020-12-16 ENCOUNTER — OFFICE VISIT (OUTPATIENT)
Dept: ONCOLOGY | Facility: CLINIC | Age: 55
End: 2020-12-16

## 2020-12-16 ENCOUNTER — APPOINTMENT (OUTPATIENT)
Dept: ONCOLOGY | Facility: HOSPITAL | Age: 55
End: 2020-12-16

## 2020-12-16 DIAGNOSIS — Z85.038 H/O COLON CANCER, STAGE II: Primary | Chronic | ICD-10-CM

## 2020-12-16 PROCEDURE — 99213 OFFICE O/P EST LOW 20 MIN: CPT | Performed by: NURSE PRACTITIONER

## 2020-12-16 NOTE — PROGRESS NOTES
12/16/2020    You have chosen to receive care through a telephone visit. Do you consent to use a telephone visit for your medical care today? Yes    This visit has been rescheduled as a phone visit to comply with patient safety concerns in accordance with CDC recommendations. Total time of discussion was 10 minutes.    Diagnosis:  Stage II colon cancer     HISTORY OF PRESENT ILLNESS:  55 yr old female diagnosed with Stage II colon cancer in 2009, [id number]. She completed 6 months of Xeloda chemotherapy in the adjuvant setting.She is being followed yearly. She denies any changes with her bowels, denies any blood in stools. Her last colonoscopy was in 2016; due for repeat in 2021.    Review of Systems   Constitutional: Negative.    HENT: Negative.    Eyes: Negative.    Respiratory: Negative.    Cardiovascular: Negative.    Gastrointestinal: Negative.    Endocrine: Negative.    Genitourinary: Negative.    Musculoskeletal: Negative.    Allergic/Immunologic: Negative.    Neurological: Negative.    Hematological: Negative.    Psychiatric/Behavioral: Negative.      Component      Latest Ref Rng & Units 12/11/2020          10:24 AM   WBC      3.40 - 10.80 10*3/mm3 6.09   RBC      3.77 - 5.28 10*6/mm3 4.94   Hemoglobin      12.0 - 15.9 g/dL 14.7   Hematocrit      34.0 - 46.6 % 43.1   MCV      79.0 - 97.0 fL 87.2   MCH      26.6 - 33.0 pg 29.8   MCHC      31.5 - 35.7 g/dL 34.1   RDW      12.3 - 15.4 % 12.0 (L)   RDW-SD      37.0 - 54.0 fl 38.3   MPV      6.0 - 12.0 fL 10.1   Platelets      140 - 450 10*3/mm3 350   Neutrophil Rel %      42.7 - 76.0 % 65.6   Lymphocyte Rel %      19.6 - 45.3 % 24.1   Monocyte Rel %      5.0 - 12.0 % 7.4   Eosinophil Rel %      0.3 - 6.2 % 1.8   Basophil Rel %      0.0 - 1.5 % 0.8   Immature Granulocyte Rel %      0.0 - 0.5 % 0.3   Neutrophils Absolute      1.70 - 7.00 10*3/mm3 3.99   Lymphocytes Absolute      0.70 - 3.10 10*3/mm3 1.47   Monocytes Absolute      0.10 - 0.90 10*3/mm3 0.45    Eosinophils Absolute      0.00 - 0.40 10*3/mm3 0.11   Basophils Absolute      0.00 - 0.20 10*3/mm3 0.05         Component      Latest Ref Rng & Units 12/11/2020          10:24 AM   CEA      ng/mL 1.41       ASSESSMENT AND PLAN:     Stage II colon cancer, diagnosed in 2009, status post surgery and 6 months adjuvant Xeloda chemotherapy. She had a colonoscopy in 2016. She is on clinical surveillance at this point. CBC CMP and CEA reviewed today and WNL; she will be do for colonoscopy in 2021.  She is not a smoker and has yearly mammograms

## 2021-03-15 DIAGNOSIS — I10 ESSENTIAL HYPERTENSION: ICD-10-CM

## 2021-03-15 RX ORDER — AMITRIPTYLINE HYDROCHLORIDE 50 MG/1
50 TABLET, FILM COATED ORAL NIGHTLY
Qty: 90 TABLET | Refills: 3 | Status: SHIPPED | OUTPATIENT
Start: 2021-03-15 | End: 2022-03-11 | Stop reason: SDUPTHER

## 2021-03-15 RX ORDER — METOPROLOL TARTRATE 50 MG/1
50 TABLET, FILM COATED ORAL 2 TIMES DAILY
Qty: 180 TABLET | Refills: 3 | Status: SHIPPED | OUTPATIENT
Start: 2021-03-15 | End: 2022-03-11 | Stop reason: SDUPTHER

## 2021-04-11 DIAGNOSIS — I10 ESSENTIAL HYPERTENSION: Chronic | ICD-10-CM

## 2021-04-12 RX ORDER — AMLODIPINE BESYLATE 10 MG/1
10 TABLET ORAL DAILY
Qty: 90 TABLET | Refills: 3 | Status: SHIPPED | OUTPATIENT
Start: 2021-04-12 | End: 2022-04-11 | Stop reason: SDUPTHER

## 2021-04-29 ENCOUNTER — OFFICE VISIT (OUTPATIENT)
Dept: FAMILY MEDICINE CLINIC | Facility: CLINIC | Age: 56
End: 2021-04-29

## 2021-04-29 VITALS
HEIGHT: 63 IN | DIASTOLIC BLOOD PRESSURE: 84 MMHG | SYSTOLIC BLOOD PRESSURE: 138 MMHG | WEIGHT: 184.2 LBS | BODY MASS INDEX: 32.64 KG/M2

## 2021-04-29 DIAGNOSIS — M25.562 CHRONIC PAIN OF BOTH KNEES: ICD-10-CM

## 2021-04-29 DIAGNOSIS — F32.4 MAJOR DEPRESSIVE DISORDER WITH SINGLE EPISODE, IN PARTIAL REMISSION (HCC): Chronic | ICD-10-CM

## 2021-04-29 DIAGNOSIS — M25.561 CHRONIC PAIN OF BOTH KNEES: ICD-10-CM

## 2021-04-29 DIAGNOSIS — I45.6 WOLFF-PARKINSON-WHITE (WPW) PATTERN: Chronic | ICD-10-CM

## 2021-04-29 DIAGNOSIS — Z13.220 SCREENING CHOLESTEROL LEVEL: ICD-10-CM

## 2021-04-29 DIAGNOSIS — E66.09 CLASS 1 OBESITY DUE TO EXCESS CALORIES WITH SERIOUS COMORBIDITY AND BODY MASS INDEX (BMI) OF 32.0 TO 32.9 IN ADULT: Chronic | ICD-10-CM

## 2021-04-29 DIAGNOSIS — Z28.21 COVID-19 VACCINATION DECLINED: ICD-10-CM

## 2021-04-29 DIAGNOSIS — G89.29 CHRONIC PAIN OF BOTH KNEES: ICD-10-CM

## 2021-04-29 DIAGNOSIS — Z85.038 H/O COLON CANCER, STAGE II: Chronic | ICD-10-CM

## 2021-04-29 DIAGNOSIS — I10 ESSENTIAL HYPERTENSION: Primary | Chronic | ICD-10-CM

## 2021-04-29 PROCEDURE — 99214 OFFICE O/P EST MOD 30 MIN: CPT | Performed by: FAMILY MEDICINE

## 2021-04-29 RX ORDER — NAPROXEN 500 MG/1
TABLET ORAL
Qty: 330 TABLET | Refills: 2 | Status: SHIPPED | OUTPATIENT
Start: 2021-04-29 | End: 2022-02-10 | Stop reason: SDUPTHER

## 2021-04-29 NOTE — PROGRESS NOTES
Answers for HPI/ROS submitted by the patient on 4/27/2021  What is the primary reason for your visit?: High Blood Pressure    Subjective   Pati Sotelo is a 55 y.o. female.  Reevaluation hypertension history of colon cancer history of WPW diagnoses listed below.  Interim continues on medications.  Last lab work acceptable.  Is due for cholesterol screen soon.  Also is due to see GI again for reevaluation of her colon.  Is having some problems with bilateral knee pain which is chronic.  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  Mild crepitance pain at the end of the shift etc.  Other medicines have remained the same.  Clines Covid vaccine at this time    History of Present Illness   HPI    The following portions of the patient's history were reviewed and updated as appropriate: allergies, current medications, past family history, past medical history, past social history, past surgical history and problem list.    Review of Systems  Review of Systems   Constitutional: Negative for activity change, appetite change, fatigue and unexpected weight change.   HENT: Negative for trouble swallowing and voice change.    Eyes: Negative for redness and visual disturbance.   Respiratory: Negative for cough and wheezing.    Cardiovascular: Negative for chest pain and palpitations.   Gastrointestinal: Negative for abdominal pain, constipation, diarrhea, nausea and vomiting.   Genitourinary: Negative for urgency.   Musculoskeletal: Positive for arthralgias. Negative for joint swelling.   Neurological: Negative for syncope and headaches.   Hematological: Negative for adenopathy.   Psychiatric/Behavioral: Negative for sleep disturbance.       Objective   Physical Exam  Physical Exam  Constitutional:       Appearance: She is well-developed.   HENT:      Head: Normocephalic.   Eyes:      Pupils: Pupils are equal, round, and reactive to light.   Neck:      Thyroid: No thyromegaly.  "  Cardiovascular:      Rate and Rhythm: Normal rate and regular rhythm.      Heart sounds: Normal heart sounds. No murmur heard.   No friction rub. No gallop.    Pulmonary:      Breath sounds: Normal breath sounds.   Abdominal:      General: There is no distension.      Palpations: Abdomen is soft. There is no mass.      Tenderness: There is no abdominal tenderness.   Musculoskeletal:         General: Normal range of motion.      Cervical back: Normal range of motion.      Right knee: Tenderness present.      Left knee: Tenderness present.      Comments: Knees bilaterally minimally tender anteriorly minimal crepitance normal range of motion no swelling negative drawer pull.  Get up and go 3 to 5 seconds gait normal no peripheral edema   Skin:     General: Skin is warm and dry.   Neurological:      Mental Status: She is alert and oriented to person, place, and time.      Deep Tendon Reflexes: Reflexes are normal and symmetric.   Psychiatric:         Attention and Perception: Attention normal.         Mood and Affect: Mood normal.         Speech: Speech normal.         Behavior: Behavior normal.         Thought Content: Thought content normal.         Cognition and Memory: Cognition normal.           Visit Vitals  /84   Ht 160 cm (63\")   Wt 83.6 kg (184 lb 3.2 oz)   BMI 32.63 kg/m²     Body mass index is 32.63 kg/m².      Assessment/Plan   Diagnoses and all orders for this visit:    1. Essential hypertension (Primary)    2. Peter-Parkinson-White (WPW) pattern    3. Major depressive disorder with single episode, in partial remission (CMS/HCC)    4. Screening cholesterol level  -     Lipid Panel With LDL / HDL Ratio; Future    5. Class 1 obesity due to excess calories with serious comorbidity and body mass index (BMI) of 32.0 to 32.9 in adult    6. Chronic pain of both knees  -     Ambulatory Referral to Physical Therapy Evaluate and treat  -     naproxen (NAPROSYN) 500 MG tablet; 1 twice daily with food as needed " knee pain only  Dispense: 330 tablet; Refill: 2    7. H/O colon cancer, stage II  -     Ambulatory Referral to Gastroenterology    8. COVID-19 vaccination declined     on wt loss measures and programs  Counseled mainly on lifestyle measures diet exercise association continuing all medication following up with cancer center.  For the knees counseled on stretching heat physical therapy for thigh muscle strengthening intermittent anti-inflammatory use only not chronic lifestyle measures discussed.  Orders as above recheck 6 months

## 2021-05-07 ENCOUNTER — HOSPITAL ENCOUNTER (OUTPATIENT)
Dept: PHYSICAL THERAPY | Facility: HOSPITAL | Age: 56
Setting detail: THERAPIES SERIES
Discharge: HOME | End: 2021-05-07

## 2021-05-07 DIAGNOSIS — G89.29 BILATERAL CHRONIC KNEE PAIN: Primary | ICD-10-CM

## 2021-05-07 DIAGNOSIS — M25.561 BILATERAL CHRONIC KNEE PAIN: Primary | ICD-10-CM

## 2021-05-07 DIAGNOSIS — M25.562 BILATERAL CHRONIC KNEE PAIN: Primary | ICD-10-CM

## 2021-05-07 PROCEDURE — 97110 THERAPEUTIC EXERCISES: CPT | Performed by: PHYSICAL THERAPIST

## 2021-05-07 PROCEDURE — 97162 PT EVAL MOD COMPLEX 30 MIN: CPT | Performed by: PHYSICAL THERAPIST

## 2021-05-14 ENCOUNTER — HOSPITAL ENCOUNTER (OUTPATIENT)
Dept: PHYSICAL THERAPY | Facility: HOSPITAL | Age: 56
Setting detail: THERAPIES SERIES
Discharge: HOME | End: 2021-05-14

## 2021-05-14 DIAGNOSIS — M25.561 BILATERAL CHRONIC KNEE PAIN: Primary | ICD-10-CM

## 2021-05-14 DIAGNOSIS — M25.562 BILATERAL CHRONIC KNEE PAIN: Primary | ICD-10-CM

## 2021-05-14 DIAGNOSIS — G89.29 BILATERAL CHRONIC KNEE PAIN: Primary | ICD-10-CM

## 2021-05-14 PROCEDURE — 97110 THERAPEUTIC EXERCISES: CPT | Performed by: PHYSICAL THERAPIST

## 2021-05-14 PROCEDURE — 97112 NEUROMUSCULAR REEDUCATION: CPT | Performed by: PHYSICAL THERAPIST

## 2021-05-18 ENCOUNTER — HOSPITAL ENCOUNTER (OUTPATIENT)
Dept: PHYSICAL THERAPY | Facility: HOSPITAL | Age: 56
Setting detail: THERAPIES SERIES
Discharge: HOME | End: 2021-05-18

## 2021-05-18 DIAGNOSIS — G89.29 BILATERAL CHRONIC KNEE PAIN: Primary | ICD-10-CM

## 2021-05-18 DIAGNOSIS — M25.562 BILATERAL CHRONIC KNEE PAIN: Primary | ICD-10-CM

## 2021-05-18 DIAGNOSIS — M25.561 BILATERAL CHRONIC KNEE PAIN: Primary | ICD-10-CM

## 2021-05-18 NOTE — THERAPY TREATMENT NOTE
Outpatient Physical Therapy Ortho Treatment Note  [facility]     Patient Name: Pati Sotelo  : 1965  MRN: [id number]  Today's Date: 2021      Visit Date: 2021  Subjective Improvement: 80%  MD visit: RADHA  Visit Number: 3/3  Total Approved:20 a year with high co-pay  Recert Date: 21  Visit Dx:    ICD-10-CM ICD-9-CM   1. Bilateral chronic knee pain  M25.561 719.46    M25.562 338.29    G89.29        Patient Active Problem List   Diagnosis   • Peter-Parkinson-White (WPW) pattern   • Essential hypertension   • Depression   • H/O colon cancer, stage II   • Class 1 obesity due to excess calories with serious comorbidity and body mass index (BMI) of 32.0 to 32.9 in adult        Past Medical History:   Diagnosis Date   • Abdominal pain     lower, hypo gastrium, s/p surgery      • Anomalous atrioventricular excitation    • C. difficile colitis     Hx   • Colon cancer (CMS/HCC)    • Conduction disorder of the heart      Supraventricular      • Depression    • Drug therapy    • Encounter for screening for malignant neoplasm of colon    • Essential hypertension    • Family history of malignant neoplasm of digestive organ    • History of colon polyps    • History of malignant neoplasm of colon     s/p resection in 2009 and chemo   • History of malignant neoplasm of gastrointestinal tract    • Hyperaldosteronism (CMS/HCC)    • Hyperplastic polyp of large intestine    • Hypertensive disorder    • Hypokalemia    • Intramural leiomyoma of uterus    • 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 Minimal voltage criteria for LVH, may be normal variant T wave abnormality, consider inferolateral ischemia. Prolonged QT Abnormal ECG   • ENDOSCOPY AND COLONOSCOPY  06/27/2016    External and internal hemorrhoids. The examination was otherwise normal. No specimens collected   • HERNIA REPAIR     • TUBAL ABDOMINAL LIGATION         PT Ortho     Row Name 05/18/21 1000       Subjective Comments    Subjective Comments  pt reports that she is 80% improved.  -TL       Precautions and Contraindications    Precautions  h/o colon cancer  -TL       Subjective Pain    Able to rate subjective pain?  yes  -TL    Pre-Treatment Pain Level  1  -TL      User Key  (r) = Recorded By, (t) = Taken By, (c) = Cosigned By    Initials Name Provider Type    Gracia Cervantes PTA Physical Therapy Assistant                      PT Assessment/Plan     Row Name 05/18/21 1100          PT Assessment    Assessment Comments  PTA observe weak quads with SLR. Pt requried cues on proper techigue. Pt also needed cues on not to hyperextend with walking or standing. Pt verbaliized understanding and able to correct with cues. Pt tolerated step fwd/step down. Pt reports that she is 80% improved. PTA noticed pitting edema around ankles. Pt reports pain remains the same at the end of the day. Pain not any better with working.  -TL        PT Plan    PT Frequency  1x/week  -TL     Predicted Duration of Therapy Intervention (PT)  2-3 weeks then to be determine  -TL     PT Plan Comments  recert next visit or d/c.  -TL       User Key  (r) = Recorded By, (t) = Taken By, (c) = Cosigned By    Initials Name Provider Type    Gracia Cervantes PTA Physical Therapy Assistant          Modalities     Row Name 05/18/21 1000             Ice    Patient denies application of Ice  Yes  -TL         User Key  (r) = Recorded By, (t) = Taken By, (c) = Cosigned By    Initials Name Provider Type    TL Gracia Cobian PTA Physical Therapy Assistant        OP Exercises     Row Name 05/18/21 1000             Subjective Comments    Subjective Comments  pt reports that she is 80% improved.  -TL         Subjective Pain    Able to rate subjective pain?  yes  -TL      Pre-Treatment Pain Level  1  -TL      Post-Treatment Pain Level  0  -TL         Total Minutes    91686 - PT Therapeutic Exercise Minutes  38  -TL      74922 - PT Therapeutic Activity Minutes  8  -TL         Exercise 1    Exercise Name 1  pro ll legs for strengthening  -TL      Time 1  10 mins  -TL      Additional Comments  level 2.5  -TL         Exercise 2    Exercise Name 2  St ham S  -TL      Reps 2  2  -TL      Time 2  30  -TL         Exercise 3    Exercise Name 3  step up fwd 6  -TL      Sets 3  2  -TL      Reps 3  10  -TL      Additional Comments  both   -TL         Exercise 4    Exercise Name 4  step downs 4' step  -TL      Sets 4  1  -TL      Reps 4  10  -TL         Exercise 5    Exercise Name 5  st mini squats  -TL      Sets 5  2  -TL      Reps 5  10  -TL         Exercise 6    Exercise Name 6  SLR  -TL      Sets 6  2  -TL      Reps 6  10  -TL         Exercise 7    Exercise Name 7  seated ham curls  -TL      Sets 7  2  -TL      Reps 7  10  -TL      Additional Comments  RTB  -TL         Exercise 8    Exercise Name 8  seated LAQ  -TL      Sets 8  1  -TL      Reps 8  10  -TL      Additional Comments  RTB  -TL        User Key  (r) = Recorded By, (t) = Taken By, (c) = Cosigned By    Initials Name Provider Type    TL Gracia Cobian PTA Physical Therapy Assistant                       PT OP Goals     Row Name 05/18/21 1000          PT Short Term Goals    STG Date to Achieve  05/28/21  -TL     STG 1  Pt independent with HEP  -TL     STG 1 Progress  Ongoing;Progressing  -TL     STG 2  Improve B hamstrings flexibility by 10-15°  -TL     STG 2 Progress   Ongoing  -TL     STG 3  Improve B hip MMT to 4+/5 (all planes)  -TL     STG 3 Progress  Ongoing  -TL     STG 4  Improve B knee ext (quads) MMT to 5/5  -TL     STG 4 Progress  Ongoing  -TL     STG 5  Reduce B knee pain by 50% with standing and work tasks by the end of the end of her work day  -TL     STG 5 Progress  Ongoing;Not Met  -TL     STG 5 Progress Comments  pt reports same amount of pain at the end of work day. pt report that her pain has not reduce by 50%.  -TL        Time Calculation    PT Goal Re-Cert Due Date  05/28/21  -TL       User Key  (r) = Recorded By, (t) = Taken By, (c) = Cosigned By    Initials Name Provider Type    Gracia Cervantes, [place] Physical Therapy Assistant          Therapy Education  Education Details: st mini squats, seated ham curls RTB, seated LAQ RTB, step up fwds, step down ecc , education on not to hyperextend both knees., ice  Given: HEP, Symptoms/condition management, Pain management, Posture/body mechanics  Program: New, Reinforced  How Provided: Verbal, Demonstration, Written  Provided to: Patient  Level of Understanding: Verbalized, Demonstrated, Teach back education performed              Time Calculation:   Start Time: 1013  Stop Time: 1059  Time Calculation (min): 46 min  Total Timed Code Minutes- PT: 46 minute(s)  Timed Charges  77682 - PT Therapeutic Exercise Minutes: 38  86499 - PT Therapeutic Activity Minutes: 8  Total Minutes  Timed Charges Total Minutes: 46   Total Minutes: 46                Gracia Cobian PTA  5/18/2021

## 2021-05-20 ENCOUNTER — APPOINTMENT (OUTPATIENT)
Dept: PHYSICAL THERAPY | Facility: HOSPITAL | Age: 56
End: 2021-05-20

## 2021-05-25 ENCOUNTER — APPOINTMENT (OUTPATIENT)
Dept: PHYSICAL THERAPY | Facility: HOSPITAL | Age: 56
End: 2021-05-25

## 2021-05-27 ENCOUNTER — APPOINTMENT (OUTPATIENT)
Dept: PHYSICAL THERAPY | Facility: HOSPITAL | Age: 56
End: 2021-05-27

## 2021-05-28 ENCOUNTER — OFFICE VISIT (OUTPATIENT)
Dept: GASTROENTEROLOGY | Facility: CLINIC | Age: 56
End: 2021-05-28

## 2021-05-28 ENCOUNTER — HOSPITAL ENCOUNTER (OUTPATIENT)
Dept: PHYSICAL THERAPY | Facility: HOSPITAL | Age: 56
Setting detail: THERAPIES SERIES
Discharge: HOME | End: 2021-05-28

## 2021-05-28 VITALS
BODY MASS INDEX: 32.82 KG/M2 | SYSTOLIC BLOOD PRESSURE: 153 MMHG | WEIGHT: 185.2 LBS | HEIGHT: 63 IN | HEART RATE: 76 BPM | DIASTOLIC BLOOD PRESSURE: 105 MMHG

## 2021-05-28 DIAGNOSIS — M25.562 BILATERAL CHRONIC KNEE PAIN: Primary | ICD-10-CM

## 2021-05-28 DIAGNOSIS — G89.29 BILATERAL CHRONIC KNEE PAIN: Primary | ICD-10-CM

## 2021-05-28 DIAGNOSIS — Z85.038 PERSONAL HISTORY OF COLON CANCER: Primary | ICD-10-CM

## 2021-05-28 DIAGNOSIS — M25.561 BILATERAL CHRONIC KNEE PAIN: Primary | ICD-10-CM

## 2021-05-28 PROCEDURE — S0285 CNSLT BEFORE SCREEN COLONOSC: HCPCS | Performed by: INTERNAL MEDICINE

## 2021-05-28 PROCEDURE — 97110 THERAPEUTIC EXERCISES: CPT | Performed by: PHYSICAL THERAPIST

## 2021-05-28 RX ORDER — SODIUM, POTASSIUM,MAG SULFATES 17.5-3.13G
1 SOLUTION, RECONSTITUTED, ORAL ORAL EVERY 12 HOURS
Qty: 1 ML | Refills: 0 | Status: SHIPPED | OUTPATIENT
Start: 2021-05-28 | End: 2021-07-08

## 2021-05-28 RX ORDER — DEXTROSE AND SODIUM CHLORIDE 5; .45 G/100ML; G/100ML
30 INJECTION, SOLUTION INTRAVENOUS CONTINUOUS PRN
Status: CANCELLED | OUTPATIENT
Start: 2021-06-29

## 2021-05-28 NOTE — THERAPY PROGRESS REPORT/RE-CERT
Outpatient Physical Therapy Ortho Progress Note  [facility]     Patient Name: Pati Sotelo  : 1965  MRN: [id number]  Today's Date: 2021      Visit Date: 2021    Attendance:  (20/yr)  Subjective Improvement: 85%  Next MD Appt: 10/29/21  Recert Date: 21    Changes in Medications: none noted  Changes in MD Orders: none noted  Number of Work Days Lost: none       Past Medical History:   Diagnosis Date   • Abdominal pain     lower, hypo gastrium, s/p surgery      • Anomalous atrioventricular excitation    • C. difficile colitis     Hx   • Colon cancer (CMS/HCC)    • Conduction disorder of the heart      Supraventricular      • Depression    • Drug therapy    • Encounter for screening for malignant neoplasm of colon    • Essential hypertension    • Family history of malignant neoplasm of digestive organ    • History of colon polyps    • History of malignant neoplasm of colon     s/p resection in 2009 and chemo   • History of malignant neoplasm of gastrointestinal tract    • Hyperaldosteronism (CMS/HCC)    • Hyperplastic polyp of large intestine    • Hypertensive disorder    • Hypokalemia    • Intramural leiomyoma of uterus    • 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 Minimal voltage criteria for LVH, may be normal variant T wave abnormality, consider inferolateral ischemia. Prolonged QT Abnormal ECG   •  "ENDOSCOPY AND COLONOSCOPY  06/27/2016    External and internal hemorrhoids. The examination was otherwise normal. No specimens collected   • HERNIA REPAIR     • TUBAL ABDOMINAL LIGATION     • UPPER GASTROINTESTINAL ENDOSCOPY  12/23/2009       Visit Dx:     ICD-10-CM ICD-9-CM   1. Bilateral chronic knee pain  M25.561 719.46    M25.562 338.29    G89.29              PT Ortho     Row Name 05/28/21 1300       Subjective Comments    Subjective Comments  Knees \"are doing a lot better.\" Pain is improved. Better able to clean her house. Thinks she can be done with P.T. this date. Continued pain with prolonged standing. No medication changes. 85% subjective improvement.  -SS       Precautions and Contraindications    Precautions  h/o colon cancer  -SS       Subjective Pain    Able to rate subjective pain?  yes  -SS    Pre-Treatment Pain Level  1  -SS       Right Lower Ext    Rt Knee Extension/Flexion AROM  0-135 deg  -SS       Left Lower Ext    Lt Knee Extension/Flexion AROM  0-125 deg  -SS       MMT (Manual Muscle Testing)    Rt Lower Ext  Rt Hip Flexion;Rt Hip Extension;Rt Hip ABduction;Rt Hip ADduction;Rt Hip Internal (Medial) Rotation;Rt Hip External (Lateral) Rotation;Rt Knee Extension;Rt Knee Flexion  -SS    Lt Lower Ext  Lt Hip Flexion;Lt Hip Extension;Lt Hip ABduction;Lt Hip ADduction;Lt Hip Internal (Medial) Rotation;Lt Hip External (Lateral) Rotation;Lt Knee Extension;Lt Knee Flexion  -SS       MMT Right Lower Ext    Rt Hip Flexion MMT, Gross Movement  (5/5) normal  -SS    Rt Hip Extension MMT, Gross Movement  (5/5) normal  -SS    Rt Hip ABduction MMT, Gross Movement  (5/5) normal  -SS    Rt Hip ADduction MMT, Gross Movement  (5/5) normal  -SS    Rt Hip Internal (Medial) Rotation MMT, Gross Movement  (5/5) normal  -SS    Rt Hip External (Lateral) Rotation MMT, Gross Movement  (5/5) normal  -SS    Rt Knee Extension MMT, Gross Movement  (5/5) normal  -SS    Rt Knee Flexion MMT, Gross Movement  (5/5) normal  -SS       " MMT Left Lower Ext    Lt Hip Flexion MMT, Gross Movement  (5/5) normal  -SS    Lt Hip Extension MMT, Gross Movement  (5/5) normal  -SS    Lt Hip ABduction MMT, Gross Movement  (4/5) good  -SS    Lt Hip ADduction MMT, Gross Movement  (5/5) normal  -SS    Lt Hip Internal (Medial) Rotation MMT, Gross Movement  (5/5) normal  -SS    Lt Hip External (Lateral) Rotation MMT, Gross Movement  (5/5) normal  -SS    Lt Knee Extension MMT, Gross Movement  (5/5) normal  -SS    Lt Knee Flexion MMT, Gross Movement  (5/5) normal  -SS       Lower Extremity Flexibility    Hamstrings  -- R 35 deg, L 45 deg  -      User Key  (r) = Recorded By, (t) = Taken By, (c) = Cosigned By    Initials Name Provider Type    SS Jono Morales, PT DPT Physical Therapist              Therapy Education  Education Details: seated HS stretch, SLR - flexion & abduction  Given: HEP  Program: Progressed  How Provided: Verbal, Written  Provided to: Patient  Level of Understanding: Verbalized, Demonstrated     PT OP Goals     Row Name 05/28/21 1300          PT Short Term Goals    STG Date to Achieve  06/18/21  -     STG 1  Pt independent with HEP  -     STG 1 Progress  Ongoing  -     STG 2  Improve B hamstrings flexibility by 10-15°  -     STG 2 Progress  Not Met;Ongoing  -     STG 3  Improve B hip MMT to 4+/5 (all planes)  -     STG 3 Progress  Partially Met;Ongoing Met for all but L hip abduction  -     STG 4  Improve B knee ext (quads) MMT to 5/5  -     STG 4 Progress  Met  -     STG 5  Reduce B knee pain by 50% with standing and work tasks by the end of the end of her work day  -     STG 5 Progress  Met  -        Time Calculation    PT Goal Re-Cert Due Date  06/18/21  -       User Key  (r) = Recorded By, (t) = Taken By, (c) = Cosigned By    Initials Name Provider Type    Jono Aleman, PT DPT Physical Therapist          PT Assessment/Plan     Row Name 05/28/21 1300          PT Assessment    Functional Limitations  " Impaired gait;Performance in work activities;Performance in leisure activities  -     Impairments  Balance;Gait;Endurance;Impaired flexibility;Pain;Muscle strength  -     Assessment Comments  Patient agreeable to 2 more therapy sessions. Would benefit from additional LE strengthening, particularly quads.   -     Rehab Potential  Good  -SS     Patient/caregiver participated in establishment of treatment plan and goals  Yes  -SS     Patient would benefit from skilled therapy intervention  Yes  -SS        PT Plan    PT Frequency  1x/week  -SS     Predicted Duration of Therapy Intervention (PT)  2 additional visits  -SS     PT Plan Comments  Stretching, LE strengthening. Cybex LE strengthening next.   -       User Key  (r) = Recorded By, (t) = Taken By, (c) = Cosigned By    Initials Name Provider Type     Jono Morales, PT DPT Physical Therapist            OP Exercises     Row Name 05/28/21 1300             Subjective Comments    Subjective Comments  Knees \"are doing a lot better.\" Pain is improved. Better able to clean her house. Thinks she can be done with P.T. this date. Continued pain with prolonged standing. No medication changes. 85% subjective improvement.  -SS         Subjective Pain    Able to rate subjective pain?  yes  -SS      Pre-Treatment Pain Level  1  -SS         Exercise 1    Exercise Name 1  Recheck  -SS         Exercise 2    Exercise Name 2  Pro2, Seat 7, LE, strength/endurance  -SS      Cueing 2  Verbal  -SS      Time 2  8 mins  -SS      Additional Comments  Level 3  -SS         Exercise 3    Exercise Name 3  Seated HS stretch  -SS      Cueing 3  Verbal;Demo  -SS      Sets 3  3  -SS      Time 3  30 sec hold  -SS         Exercise 4    Exercise Name 4  Wall sits ~25%  -SS      Cueing 4  Verbal;Demo  -SS         Exercise 5    Exercise Name 5  SLR - flexion  -SS      Cueing 5  Verbal  -SS      Sets 5  1  -SS      Reps 5  5 B  -SS      Time 5  5 sec hold  -SS         Exercise 6    " Exercise Name 6  SLR - abduction  -SS      Cueing 6  Verbal  -SS      Sets 6  1  -SS      Reps 6  5 B  -SS      Time 6  5 sec hold  -SS        User Key  (r) = Recorded By, (t) = Taken By, (c) = Cosigned By    Initials Name Provider Type    Jono Aleman, PT DPT Physical Therapist           Outcome Measure Options: Lower Extremity Functional Scale (LEFS)  Lower Extremity Functional Index  Any of your usual work, housework or school activities: A little bit of difficulty  Your usual hobbies, recreational or sporting activities: A little bit of difficulty  Getting into or out of the bath: Moderate difficulty  Walking between rooms: No difficulty  Putting on your shoes or socks: Moderate difficulty  Squatting: Quite a bit of difficulty  Lifting an object, like a bag of groceries from the floor: A little bit of difficulty  Performing light activities around your home: No difficulty  Performing heavy activities around your home: Moderate difficulty  Getting into or out of a car: Moderate difficulty  Walking 2 blocks: Moderate difficulty  Walking a mile: A little bit of difficulty  Going up or down 10 stairs (about 1 flight of stairs): Moderate difficulty  Standing for 1 hour: Quite a bit of difficulty  Sitting for 1 hour: Quite a bit of difficulty  Running on even ground: Quite a bit of difficulty  Running on uneven ground: Quite a bit of difficulty  Making sharp turns while running fast: Extreme difficulty or unable to perform activity  Hopping: Extreme difficulty or unable to perform activity  Rolling over in bed: No difficulty  Total: 41      Time Calculation:     Start Time: 1306  Stop Time: 1346  Time Calculation (min): 40 min     Therapy Charges for Today     Code Description Service Date Service Provider Modifiers Qty    [id number] HC PT THER PROC EA 15 MIN 5/28/2021 Jono Morales, PT DPT GP 3                   Jono Morales, PT, DPT, CHT  5/28/2021

## 2021-05-28 NOTE — PROGRESS NOTES
[facility]      Chief Complaint:   Chief Complaint   Patient presents with   • Colon Cancer Screening       Subjective     HPI:   Patient with personal history of colon cancer in 2009.  Patient for follow-up for repeat colonoscopy.  Patient denies any nausea vomiting or abdominal pain or changes in bowel habits at this time.    Plan; we will schedule patient for screening colonoscopy because of history of colon cancer    Past Medical History:   Past Medical History:   Diagnosis Date   • Abdominal pain     lower, hypo gastrium, s/p surgery      • Anomalous atrioventricular excitation    • C. difficile colitis     Hx   • Colon cancer (CMS/HCC)    • Conduction disorder of the heart      Supraventricular      • Depression    • Drug therapy    • Encounter for screening for malignant neoplasm of colon    • Essential hypertension    • Family history of malignant neoplasm of digestive organ    • History of colon polyps    • History of malignant neoplasm of colon     s/p resection in 2009 and chemo   • History of malignant neoplasm of gastrointestinal tract    • Hyperaldosteronism (CMS/HCC)    • Hyperplastic polyp of large intestine    • Hypertensive disorder    • Hypokalemia    • Intramural leiomyoma of uterus    • 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     Folate and B12 ordered      • Menstruation disorder     abnormal bleeding from female genital tract      • MRSA infection     Hx   • Screening for malignant neoplasm of breast    • Vitamin D deficiency    • Peter-Parkinson-White (WPW) pattern        Past Surgical History:  Past Surgical History:   Procedure Laterality Date   • CARDIAC ABLATION     • CARDIAC CATHETERIZATION  07/02/2007    No significant coronary artery disease with normal left ventricular function   • COLECTOMY PARTIAL / TOTAL  12/23/2009    Mid sigmoid obstructing colon cancer   • ECG CONVERTED  09/29/2008    Sinus rhythm with short [place]. Minimal voltage criteria for LVH, may be normal  variant T wave abnormality, consider inferolateral ischemia. Prolonged QT Abnormal ECG   • ENDOSCOPY AND COLONOSCOPY  06/27/2016    External and internal hemorrhoids. The examination was otherwise normal. No specimens collected   • HERNIA REPAIR     • TUBAL ABDOMINAL LIGATION     • UPPER GASTROINTESTINAL ENDOSCOPY  12/23/2009       Family History:  Family History   Problem Relation Age of Onset   • Depression Other    • Hypertension Other    • Cancer Father         colon   • Colon cancer Father    • Cancer Paternal Grandfather        Social History:   reports that she has never smoked. She has never used smokeless tobacco. She reports that she does not drink alcohol and does not use drugs.    Medications:   Prior to Admission medications    Medication Sig Start Date End Date Taking? Authorizing Provider   amitriptyline (ELAVIL) 50 MG tablet Take 1 tablet by mouth Every Night. 3/15/21  Yes George Ayala MD   amLODIPine (NORVASC) 10 MG tablet Take 1 tablet by mouth Daily. 4/12/21  Yes George Ayala MD   lisinopril (PRINIVIL,ZESTRIL) 40 MG tablet Take 1 tablet by mouth Daily. 5/7/20  Yes George Ayala MD   metoprolol tartrate (LOPRESSOR) 50 MG tablet Take 1 tablet by mouth 2 (Two) Times a Day. 3/15/21  Yes George Ayala MD   naproxen (NAPROSYN) 500 MG tablet 1 twice daily with food as needed knee pain only 4/29/21  Yes George Ayala MD   potassium chloride (KLOR-CON) 20 MEQ CR tablet Take 1 tablet by mouth 2 (Two) Times a Day. 5/7/20  Yes George Ayala MD   spironolactone (ALDACTONE) 25 MG tablet Take 1 tablet by mouth Daily. 5/7/20  Yes George Ayala MD   sodium-potassium-magnesium sulfates (SUPREP) 17.5-3.13-1.6 GM/177ML solution oral solution Take 1 bottle by mouth Every 12 (Twelve) Hours. 5/28/21   Jorge A Sheehan MD       Allergies:  Tums [calcium carbonate antacid] and Sulfa antibiotics    ROS:    Review of Systems   Constitutional: Negative for activity change, appetite change, chills, diaphoresis,  "fatigue, fever and unexpected weight change.   HENT: Negative for sore throat and trouble swallowing.    Respiratory: Negative for shortness of breath.    Gastrointestinal: Negative for abdominal distention, abdominal pain, anal bleeding, blood in stool, constipation, diarrhea, nausea, rectal pain and vomiting.   Endocrine: Negative for polydipsia, polyphagia and polyuria.   Genitourinary: Negative for difficulty urinating.   Musculoskeletal: Negative for arthralgias.   Skin: Negative for pallor.   Allergic/Immunologic: Negative for food allergies.   Neurological: Negative for weakness and light-headedness.   Psychiatric/Behavioral: Negative for behavioral problems.     Objective     Blood pressure (!) 153/105, pulse 76, height 160 cm (63\"), weight 84 kg (185 lb 3.2 oz), not currently breastfeeding.    Physical Exam  Constitutional:       General: She is not in acute distress.     Appearance: She is well-developed. She is not diaphoretic.   HENT:      Head: Normocephalic and atraumatic.   Cardiovascular:      Rate and Rhythm: Normal rate and regular rhythm.      Heart sounds: Normal heart sounds. No murmur heard.   No friction rub. No gallop.    Pulmonary:      Effort: No respiratory distress.      Breath sounds: Normal breath sounds. No wheezing or rales.   Chest:      Chest wall: No tenderness.   Abdominal:      General: Bowel sounds are normal. There is no distension.      Palpations: Abdomen is soft. There is no mass.      Tenderness: There is no abdominal tenderness. There is no guarding or rebound.      Hernia: No hernia is present.   Musculoskeletal:         General: Normal range of motion.   Skin:     General: Skin is warm and dry.      Coloration: Skin is not pale.      Findings: No erythema or rash.   Neurological:      Mental Status: She is alert and oriented to person, place, and time.   Psychiatric:         Behavior: Behavior normal.         Thought Content: Thought content normal.         Judgment: " Judgment normal.          Assessment/Plan   Diagnoses and all orders for this visit:    1. Personal history of colon cancer (Primary)  -     Case Request; Standing  -     dextrose 5 % and sodium chloride 0.45 % infusion  -     Case Request    Other orders  -     Follow Anesthesia Guidelines / Standing Orders; Future  -     Obtain Informed Consent; Future  -     Implement Anesthesia Orders Day of Procedure; Standing  -     Obtain Informed Consent; Standing  -     POC Glucose Once; Standing  -     Insert Peripheral IV; Standing  -     sodium-potassium-magnesium sulfates (SUPREP) 17.5-3.13-1.6 GM/177ML solution oral solution; Take 1 bottle by mouth Every 12 (Twelve) Hours.  Dispense: 1 mL; Refill: 0        COLONOSCOPY (N/A)     Diagnosis Plan   1. Personal history of colon cancer  Case Request    dextrose 5 % and sodium chloride 0.45 % infusion    Case Request       Anticipated Surgical Procedure:  Orders Placed This Encounter   Procedures   • Follow Anesthesia Guidelines / Standing Orders     Standing Status:   Future   • Obtain Informed Consent     Standing Status:   Future     Order Specific Question:   Informed Consent Given For     Answer:   colonoscopy       The risks, benefits, and alternatives of this procedure have been discussed with the patient or the responsible party- the patient understands and agrees to proceed.

## 2021-06-03 ENCOUNTER — HOSPITAL ENCOUNTER (OUTPATIENT)
Dept: PHYSICAL THERAPY | Facility: HOSPITAL | Age: 56
Setting detail: THERAPIES SERIES
Discharge: HOME | End: 2021-06-03

## 2021-06-03 DIAGNOSIS — M25.562 BILATERAL CHRONIC KNEE PAIN: Primary | ICD-10-CM

## 2021-06-03 DIAGNOSIS — G89.29 BILATERAL CHRONIC KNEE PAIN: Primary | ICD-10-CM

## 2021-06-03 DIAGNOSIS — M25.561 BILATERAL CHRONIC KNEE PAIN: Primary | ICD-10-CM

## 2021-06-03 PROCEDURE — 97535 SELF CARE MNGMENT TRAINING: CPT

## 2021-06-03 PROCEDURE — 97110 THERAPEUTIC EXERCISES: CPT

## 2021-06-03 NOTE — THERAPY DISCHARGE NOTE
Outpatient Physical Therapy Ortho Treatment Note/Discharge Summary  [facility]     Patient Name: Pati Sotelo  : 1965  MRN: [id number]  Today's Date: 6/3/2021      Visit Date: 2021     Subjective Improvement 90%  Visits 5/5  Visits approved 20 per year  RTMD 10-  Recert Date 2021    Chronic B LE pain and weakness    Visit Dx:    ICD-10-CM ICD-9-CM   1. Bilateral chronic knee pain  M25.561 719.46    M25.562 338.29    G89.29        Patient Active Problem List   Diagnosis   • Peter-Parkinson-White (WPW) pattern   • Essential hypertension   • Depression   • H/O colon cancer, stage II   • Class 1 obesity due to excess calories with serious comorbidity and body mass index (BMI) of 32.0 to 32.9 in adult   • Personal history of colon cancer        Past Medical History:   Diagnosis Date   • Abdominal pain     lower, hypo gastrium, s/p surgery      • Anomalous atrioventricular excitation    • C. difficile colitis     Hx   • Colon cancer (CMS/HCC)    • Conduction disorder of the heart      Supraventricular      • Depression    • Drug therapy    • Encounter for screening for malignant neoplasm of colon    • Essential hypertension    • Family history of malignant neoplasm of digestive organ    • History of colon polyps    • History of malignant neoplasm of colon     s/p resection in 2009 and chemo   • History of malignant neoplasm of gastrointestinal tract    • Hyperaldosteronism (CMS/HCC)    • Hyperplastic polyp of large intestine    • Hypertensive disorder    • Hypokalemia    • Intramural leiomyoma of uterus    • 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 Minimal voltage criteria for LVH, may be normal variant T wave abnormality, consider inferolateral ischemia. Prolonged QT Abnormal ECG   • ENDOSCOPY AND COLONOSCOPY  06/27/2016    External and internal hemorrhoids. The examination was otherwise normal. No specimens collected   • HERNIA REPAIR     • TUBAL ABDOMINAL LIGATION     • UPPER GASTROINTESTINAL ENDOSCOPY  12/23/2009                       PT Assessment/Plan     Row Name 06/03/21 1746          PT Assessment    Assessment Comments  Patient wishes to stop therapy at this time.  She is 90% subjective improvement.  Patient is concerned about her $50 co-pay and feels she is unable to cont to make the payment.  Patient does have decrease balance in B LE.  Patient was encouraged to join a gym to cont with LE strengthening. Patient was in agreement with joining the [facility].  LE strengtheing and ROM ex and equipment was review with patient.  Patientwas encouraged to cont with HEP and that if she had any questions concerning ex or increase pain to call our office.  -CP        PT Plan    PT Plan Comments  D/C to home with HEP>  -CP       User Key  (r) = Recorded By, (t) = Taken By, (c) = Cosigned By    Initials Name Provider Type    Jessica Parrish, [place] Physical Therapy Assistant              OP Exercises     Row Name 06/03/21 1749 06/03/21 1700          Subjective Comments    Subjective Comments  --  Patient states that she is able to perform all work and household duties wihtout pain.  She does not feel she needs to come to therapy as she is 90% better   She has a co-pay of $50 for every visit and is unable to afford it at this time.  patient does wear her knee brace at all times when working..  She was a member at a 24  "hour gym but the parking was bad.  She is going to see about joining the [facility]  -CP        Subjective Pain    Able to rate subjective pain?  --  yes  -CP     Pre-Treatment Pain Level  --  0  -CP     Post-Treatment Pain Level  --  0  -CP        Total Minutes    14439 - PT Therapeutic Exercise Minutes  49  -CP  --        Exercise 1    Exercise Name 1  --  Pro II level 3  -CP     Time 1  --  10  -CP        Exercise 2    Exercise Name 2  --  incline stretch  -CP     Cueing 2  --  Verbal  -CP     Sets 2  --  3  -CP     Time 2  --  30  -CP        Exercise 3    Exercise Name 3  --  standing HS stretch  -CP     Cueing 3  --  Verbal  -CP     Sets 3  --  3  -CP     Time 3  --  30  -CP     Additional Comments  --  B LE  -CP        Exercise 4    Exercise Name 4  --  step up 6\"  -CP     Cueing 4  --  Verbal;Demo  -CP     Sets 4  --  2  -CP     Reps 4  --  10  -CP     Time 4  --  B LE  -CP        Exercise 5    Exercise Name 5  --  step down 4\"  -CP     Cueing 5  --  Verbal;Demo  -CP     Sets 5  --  2  -CP     Reps 5  --  10  -CP     Time 5  --  B LE  -CP        Exercise 6    Exercise Name 6  --  SL Stance B  -CP     Cueing 6  --  Verbal  -CP     Sets 6  --  R LE Max 3\"  -CP     Reps 6  --  L LE max 7\"  -CP        Exercise 7    Exercise Name 7  --  cybex leg press  -CP     Cueing 7  --  Verbal;Demo  -CP     Sets 7  --  2  -CP     Reps 7  --  15  -CP     Time 7  --  70 lb  -CP        Exercise 8    Exercise Name 8  --  cybex hip Ab   -CP     Cueing 8  --  Verbal;Tactile  -CP     Sets 8  --  2  -CP     Reps 8  --  15  -CP     Time 8  --  20 lb  -CP        Exercise 9    Exercise Name 9  --  seated knne fl with tband  -CP     Cueing 9  --  Verbal;Tactile  -CP     Sets 9  --  2  -CP     Reps 9  --  10  -CP     Time 9  --  B LE   -CP     Additional Comments  --  greem  -CP        Exercise 10    Exercise Name 10  --  patient care concerning cont LE ex at gym and joining [facility].  -CP     Time 10  --  5  -CP        Exercise 11    Exercise " Name 11  --  review of HEP and gym equipment  -CP     Time 11  --  6  -CP       User Key  (r) = Recorded By, (t) = Taken By, (c) = Cosigned By    Initials Name Provider Type    Jessica Parrish PTA Physical Therapy Assistant                         PT OP Goals     Row Name 06/03/21 1700          PT Short Term Goals    STG Date to Achieve  06/18/21  -CP     STG 1  Pt independent with HEP  -CP     STG 1 Progress  Ongoing  -CP     STG 2  Improve B hamstrings flexibility by 10-15°  -CP     STG 2 Progress  Not Met;Ongoing  -CP     STG 3  Improve B hip MMT to 4+/5 (all planes)  -CP     STG 3 Progress  Partially Met;Ongoing Met for all but L hip abduction  -CP     STG 4  Improve B knee ext (quads) MMT to 5/5  -CP     STG 4 Progress  Met  -CP     STG 5  Reduce B knee pain by 50% with standing and work tasks by the end of the end of her work day  -CP     STG 5 Progress  Met  -CP        Time Calculation    PT Goal Re-Cert Due Date  06/18/21  -CP       User Key  (r) = Recorded By, (t) = Taken By, (c) = Cosigned By    Initials Name Provider Type    Jessica Parrish PTA Physical Therapy Assistant          Therapy Education  Education Details: review cybex and [employer] gym equipment,  review of HEP  Given: HEP, Symptoms/condition management  Program: New  How Provided: Verbal, Demonstration  Provided to: Patient  Level of Understanding: Teach back education performed, Verbalized, Demonstrated  00433 - PT Self Care/Mgmt Minutes: 11              Time Calculation:   Start Time: 1645  Stop Time: 1745  Time Calculation (min): 60 min  Total Timed Code Minutes- PT: 60 minute(s)  Timed Charges  42045 - PT Therapeutic Exercise Minutes: 49  24213 - PT Self Care/Mgmt Minutes: 11  Total Minutes  Timed Charges Total Minutes: 60   Total Minutes: 60  Therapy Charges for Today     Code Description Service Date Service Provider Modifiers Qty    62436672469 HC PT SELF CARE/MGMT/TRAIN EA 15 MIN 6/3/2021 Jessica Joseph PTA GP 1     96674926958 HC PT THER PROC EA 15 MIN 6/3/2021 Jessica Joseph, [place] GP 3                OP PT Discharge Summary  Date of Discharge: 06/03/21  Reason for Discharge: Patient/Caregiver request, Unable to pay/insurance denied care  Outcomes Achieved: Patient able to partially acheive established goals  Discharge Destination: Home with home program      Jessica Joseph PTA  6/3/2021

## 2021-06-04 ENCOUNTER — APPOINTMENT (OUTPATIENT)
Dept: PHYSICAL THERAPY | Facility: HOSPITAL | Age: 56
End: 2021-06-04

## 2021-06-08 ENCOUNTER — APPOINTMENT (OUTPATIENT)
Dept: PHYSICAL THERAPY | Facility: HOSPITAL | Age: 56
End: 2021-06-08

## 2021-06-09 ENCOUNTER — APPOINTMENT (OUTPATIENT)
Dept: PHYSICAL THERAPY | Facility: HOSPITAL | Age: 56
End: 2021-06-09

## 2021-06-14 DIAGNOSIS — I10 ESSENTIAL HYPERTENSION: Chronic | ICD-10-CM

## 2021-06-15 RX ORDER — POTASSIUM CHLORIDE 20 MEQ/1
20 TABLET, EXTENDED RELEASE ORAL 2 TIMES DAILY
Qty: 180 TABLET | Refills: 3 | Status: SHIPPED | OUTPATIENT
Start: 2021-06-15 | End: 2022-06-13 | Stop reason: SDUPTHER

## 2021-06-24 RX ORDER — ERGOCALCIFEROL (VITAMIN D2) 10 MCG
400 TABLET ORAL DAILY
COMMUNITY

## 2021-06-29 ENCOUNTER — HOSPITAL ENCOUNTER (OUTPATIENT)
Facility: HOSPITAL | Age: 56
Setting detail: HOSPITAL OUTPATIENT SURGERY
Discharge: HOME | End: 2021-06-29
Attending: INTERNAL MEDICINE | Admitting: INTERNAL MEDICINE

## 2021-06-29 ENCOUNTER — ANESTHESIA EVENT (OUTPATIENT)
Dept: GASTROENTEROLOGY | Facility: HOSPITAL | Age: 56
End: 2021-06-29

## 2021-06-29 ENCOUNTER — ANESTHESIA (OUTPATIENT)
Dept: GASTROENTEROLOGY | Facility: HOSPITAL | Age: 56
End: 2021-06-29

## 2021-06-29 VITALS
TEMPERATURE: 97 F | DIASTOLIC BLOOD PRESSURE: 74 MMHG | OXYGEN SATURATION: 96 % | SYSTOLIC BLOOD PRESSURE: 120 MMHG | RESPIRATION RATE: 16 BRPM | BODY MASS INDEX: 32.07 KG/M2 | WEIGHT: 181 LBS | HEIGHT: 63 IN | HEART RATE: 65 BPM

## 2021-06-29 DIAGNOSIS — Z85.038 PERSONAL HISTORY OF COLON CANCER: ICD-10-CM

## 2021-06-29 PROCEDURE — 45385 COLONOSCOPY W/LESION REMOVAL: CPT | Performed by: INTERNAL MEDICINE

## 2021-06-29 PROCEDURE — 25010000002 PROPOFOL 10 MG/ML EMULSION: Performed by: NURSE ANESTHETIST, CERTIFIED REGISTERED

## 2021-06-29 RX ORDER — DEXTROSE AND SODIUM CHLORIDE 5; .45 G/100ML; G/100ML
30 INJECTION, SOLUTION INTRAVENOUS CONTINUOUS PRN
Status: DISCONTINUED | OUTPATIENT
Start: 2021-06-29 | End: 2021-06-29 | Stop reason: HOSPADM

## 2021-06-29 RX ORDER — LIDOCAINE HYDROCHLORIDE 20 MG/ML
INJECTION, SOLUTION INTRAVENOUS AS NEEDED
Status: DISCONTINUED | OUTPATIENT
Start: 2021-06-29 | End: 2021-06-29 | Stop reason: SURG

## 2021-06-29 RX ORDER — ONDANSETRON 2 MG/ML
4 INJECTION INTRAMUSCULAR; INTRAVENOUS ONCE AS NEEDED
Status: DISCONTINUED | OUTPATIENT
Start: 2021-06-29 | End: 2021-06-29 | Stop reason: HOSPADM

## 2021-06-29 RX ORDER — PROPOFOL 10 MG/ML
VIAL (ML) INTRAVENOUS AS NEEDED
Status: DISCONTINUED | OUTPATIENT
Start: 2021-06-29 | End: 2021-06-29 | Stop reason: SURG

## 2021-06-29 RX ADMIN — PROPOFOL 10 MG: 10 INJECTION, EMULSION INTRAVENOUS at 16:01

## 2021-06-29 RX ADMIN — PROPOFOL 20 MG: 10 INJECTION, EMULSION INTRAVENOUS at 16:00

## 2021-06-29 RX ADMIN — LIDOCAINE HYDROCHLORIDE 50 MG: 20 INJECTION, SOLUTION INTRAVENOUS at 15:54

## 2021-06-29 RX ADMIN — PROPOFOL 50 MG: 10 INJECTION, EMULSION INTRAVENOUS at 15:54

## 2021-06-29 RX ADMIN — DEXTROSE AND SODIUM CHLORIDE 30 ML/HR: 5; 450 INJECTION, SOLUTION INTRAVENOUS at 14:46

## 2021-06-29 RX ADMIN — PROPOFOL 30 MG: 10 INJECTION, EMULSION INTRAVENOUS at 15:55

## 2021-06-29 RX ADMIN — PROPOFOL 20 MG: 10 INJECTION, EMULSION INTRAVENOUS at 15:57

## 2021-06-29 RX ADMIN — PROPOFOL 20 MG: 10 INJECTION, EMULSION INTRAVENOUS at 15:58

## 2021-06-29 NOTE — ANESTHESIA PREPROCEDURE EVALUATION
Anesthesia Evaluation     no history of anesthetic complications:  NPO Solid Status: > 8 hours  NPO Liquid Status: > 8 hours           Airway   Mallampati: II  TM distance: >3 FB  Neck ROM: full  No difficulty expected  Dental - normal exam     Pulmonary - negative pulmonary ROS and normal exam   Cardiovascular - normal exam    (+) hypertension 2 medications or greater, dysrhythmias (WPW),       Neuro/Psych  (+) psychiatric history Depression,     GI/Hepatic/Renal/Endo    (+) obesity,       Musculoskeletal (-) negative ROS    Abdominal    Substance History - negative use     OB/GYN negative ob/gyn ROS         Other      history of cancer (COLON) remission                  Anesthesia Plan    ASA 3     MAC     intravenous induction     Anesthetic plan, all risks, benefits, and alternatives have been provided, discussed and informed consent has been obtained with: patient.

## 2021-06-29 NOTE — ANESTHESIA POSTPROCEDURE EVALUATION
Patient: Pati Sotelo    Procedure Summary     Date: 06/29/21 Room / Location: [facility] ENDOSCOPY 1 / [facility] ENDOSCOPY    Anesthesia Start: 1542 Anesthesia Stop: 1604    Procedure: COLONOSCOPY (N/A ) Diagnosis:       Personal history of colon cancer      (Personal history of colon cancer [Z85.038])    Surgeons: Jorge A Sheehan MD Provider: Angelita Grigsby CRNA    Anesthesia Type: MAC ASA Status: 3          Anesthesia Type: MAC    Vitals  No vitals data found for the desired time range.          Post Anesthesia Care and Evaluation    Patient location during evaluation: bedside  Patient participation: complete - patient participated  Level of consciousness: awake  Pain score: 0  Pain management: adequate  Airway patency: patent  Anesthetic complications: No anesthetic complications  PONV Status: none  Cardiovascular status: acceptable  Respiratory status: acceptable  Hydration status: acceptable    Comments: ---------------------------               06/29/21                      1435         ---------------------------   BP:          179/98         Pulse:         63           Resp:          18           Temp:   96.4 °F (35.8 °C)   SpO2:        (!) 63%       ---------------------------

## 2021-07-02 ENCOUNTER — LAB (OUTPATIENT)
Dept: LAB | Facility: HOSPITAL | Age: 56
End: 2021-07-02

## 2021-07-02 ENCOUNTER — TRANSCRIBE ORDERS (OUTPATIENT)
Dept: LAB | Facility: HOSPITAL | Age: 56
End: 2021-07-02

## 2021-07-02 DIAGNOSIS — E87.6 HYPOKALEMIA: ICD-10-CM

## 2021-07-02 DIAGNOSIS — E87.6 HYPOKALEMIA: Primary | ICD-10-CM

## 2021-07-02 LAB
ANION GAP SERPL CALCULATED.3IONS-SCNC: 9 MMOL/L (ref 5–15)
BUN SERPL-MCNC: 9 MG/DL (ref 6–20)
BUN/CREAT SERPL: 8.3 (ref 7–25)
CALCIUM SPEC-SCNC: 9.5 MG/DL (ref 8.6–10.5)
CHLORIDE SERPL-SCNC: 108 MMOL/L (ref 98–107)
CO2 SERPL-SCNC: 26 MMOL/L (ref 22–29)
CREAT SERPL-MCNC: 1.09 MG/DL (ref 0.57–1)
GFR SERPL CREATININE-BSD FRML MDRD: 52 ML/MIN/1.73
GLUCOSE SERPL-MCNC: 85 MG/DL (ref 65–99)
LAB AP CASE REPORT: NORMAL
MAGNESIUM SERPL-MCNC: 2.1 MG/DL (ref 1.6–2.6)
PATH REPORT.FINAL DX SPEC: NORMAL
POTASSIUM SERPL-SCNC: 4 MMOL/L (ref 3.5–5.2)
SODIUM SERPL-SCNC: 143 MMOL/L (ref 136–145)

## 2021-07-02 PROCEDURE — 83735 ASSAY OF MAGNESIUM: CPT | Performed by: INTERNAL MEDICINE

## 2021-07-02 PROCEDURE — 80048 BASIC METABOLIC PNL TOTAL CA: CPT | Performed by: INTERNAL MEDICINE

## 2021-07-02 PROCEDURE — 36415 COLL VENOUS BLD VENIPUNCTURE: CPT | Performed by: INTERNAL MEDICINE

## 2021-07-08 ENCOUNTER — OFFICE VISIT (OUTPATIENT)
Dept: GASTROENTEROLOGY | Facility: CLINIC | Age: 56
End: 2021-07-08

## 2021-07-08 VITALS
HEART RATE: 74 BPM | HEIGHT: 63 IN | BODY MASS INDEX: 32.64 KG/M2 | DIASTOLIC BLOOD PRESSURE: 96 MMHG | SYSTOLIC BLOOD PRESSURE: 161 MMHG | WEIGHT: 184.2 LBS

## 2021-07-08 DIAGNOSIS — Z85.048 PERSONAL HISTORY OF MALIGNANT NEOPLASM OF RECTUM, RECTOSIGMOID JUNCTION, AND ANUS: ICD-10-CM

## 2021-07-08 DIAGNOSIS — Z86.010 PERSONAL HISTORY OF COLONIC POLYPS: Primary | ICD-10-CM

## 2021-07-08 PROCEDURE — 99212 OFFICE O/P EST SF 10 MIN: CPT | Performed by: INTERNAL MEDICINE

## 2021-07-08 NOTE — PROGRESS NOTES
[facility]      Chief Complaint:   Chief Complaint   Patient presents with   • Colonoscopy Performed 06/29/2021     Personal History Of Colon Cancer       Subjective     HPI:   Patient with personal history of colon cancer.  Patient colon cancer 2009.  Patient for repeat colonoscopy shows a polyp right along the anastomotic line.  This polyp was a tubular adenoma.  Patient has a very strong family history of colon cancer with his grandfather dying of colon cancer in her father having had colon cancer.    Plan; we will schedule patient for repeat colonoscopy in 1 year    Past Medical History:   Past Medical History:   Diagnosis Date   • Abdominal pain     lower, hypo gastrium, s/p surgery      • Anomalous atrioventricular excitation    • C. difficile colitis     Hx   • Colon cancer (CMS/HCC)    • Conduction disorder of the heart      Supraventricular      • Depression    • Drug therapy    • Encounter for screening for malignant neoplasm of colon    • Essential hypertension    • Family history of malignant neoplasm of digestive organ    • History of colon polyps    • History of malignant neoplasm of colon     s/p resection in 2009 and chemo   • History of malignant neoplasm of gastrointestinal tract    • Hyperaldosteronism (CMS/HCC)    • Hyperplastic polyp of large intestine    • Hypertensive disorder    • Hypokalemia    • Intramural leiomyoma of uterus    • 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     Folate and B12 ordered      • Menstruation disorder     abnormal bleeding from female genital tract      • MRSA infection     Hx   • Screening for malignant neoplasm of breast    • Vitamin D deficiency    • Peter-Parkinson-White (WPW) pattern        Past Surgical History:    Past Surgical History:   Procedure Laterality Date   • CARDIAC ABLATION     • CARDIAC CATHETERIZATION  07/02/2007    No significant coronary artery disease with normal left ventricular function   • COLECTOMY PARTIAL / TOTAL   12/23/2009    Mid sigmoid obstructing colon cancer   • COLONOSCOPY N/A 6/29/2021    Procedure: COLONOSCOPY;  Surgeon: Jorge A Sheehan MD;  Location: [facility] ENDOSCOPY;  Service: Gastroenterology;  Laterality: N/A;   • ECG CONVERTED  09/29/2008    Sinus rhythm with short SD. Minimal voltage criteria for LVH, may be normal variant T wave abnormality, consider inferolateral ischemia. Prolonged QT Abnormal ECG   • ENDOSCOPY AND COLONOSCOPY  06/27/2016    External and internal hemorrhoids. The examination was otherwise normal. No specimens collected   • HERNIA REPAIR     • TUBAL ABDOMINAL LIGATION     • UPPER GASTROINTESTINAL ENDOSCOPY  12/23/2009       Family History:  Family History   Problem Relation Age of Onset   • Depression Other    • Hypertension Other    • Cancer Father         colon   • Colon cancer Father    • Cancer Paternal Grandfather        Social History:   reports that she has never smoked. She has never used smokeless tobacco. She reports that she does not drink alcohol and does not use drugs.    Medications:   Prior to Admission medications    Medication Sig Start Date End Date Taking? Authorizing Provider   amitriptyline (ELAVIL) 50 MG tablet Take 1 tablet by mouth Every Night. 3/15/21  Yes George Ayala MD   amLODIPine (NORVASC) 10 MG tablet Take 1 tablet by mouth Daily. 4/12/21  Yes George Ayala MD   lisinopril (PRINIVIL,ZESTRIL) 40 MG tablet Take 1 tablet by mouth Daily. 5/7/20  Yes George Ayala MD   metoprolol tartrate (LOPRESSOR) 50 MG tablet Take 1 tablet by mouth 2 (Two) Times a Day. 3/15/21  Yes George Ayala MD   naproxen (NAPROSYN) 500 MG tablet 1 twice daily with food as needed knee pain only 4/29/21  Yes George Ayala MD   potassium chloride (KLOR-CON) 20 MEQ CR tablet Take 1 tablet by mouth 2 (Two) Times a Day. 6/15/21  Yes George Ayala MD   spironolactone (ALDACTONE) 25 MG tablet Take 1 tablet by mouth Daily. 5/7/20  Yes George Ayala MD   Vitamin D, Cholecalciferol,  "(CHOLECALCIFEROL) 10 MCG (400 UNIT) tablet Take 400 Units by mouth Daily.   Yes Provider, MD Seth   sodium-potassium-magnesium sulfates (SUPREP) 17.5-3.13-1.6 GM/177ML solution oral solution Take 1 bottle by mouth Every 12 (Twelve) Hours. 5/28/21 7/8/21  Jorge A Sheehan MD       Allergies:  Tums [calcium carbonate antacid] and Sulfa antibiotics    ROS:    Review of Systems   Constitutional: Negative for activity change, appetite change, chills, diaphoresis, fatigue, fever and unexpected weight change.   HENT: Negative for sore throat and trouble swallowing.    Respiratory: Negative for shortness of breath.    Gastrointestinal: Negative for abdominal distention, abdominal pain, anal bleeding, blood in stool, constipation, diarrhea, nausea, rectal pain and vomiting.   Endocrine: Negative for polydipsia, polyphagia and polyuria.   Genitourinary: Negative for difficulty urinating.   Musculoskeletal: Negative for arthralgias.   Skin: Negative for pallor.   Allergic/Immunologic: Negative for food allergies.   Neurological: Negative for weakness and light-headedness.   Psychiatric/Behavioral: Negative for behavioral problems.     Objective     Blood pressure 161/96, pulse 74, height 160 cm (63\"), weight 83.6 kg (184 lb 3.2 oz), not currently breastfeeding.    Physical Exam  Constitutional:       General: She is not in acute distress.     Appearance: She is well-developed. She is not diaphoretic.   HENT:      Head: Normocephalic and atraumatic.   Cardiovascular:      Rate and Rhythm: Normal rate and regular rhythm.      Heart sounds: Normal heart sounds. No murmur heard.   No friction rub. No gallop.    Pulmonary:      Effort: No respiratory distress.      Breath sounds: Normal breath sounds. No wheezing or rales.   Chest:      Chest wall: No tenderness.   Abdominal:      General: Bowel sounds are normal. There is no distension.      Palpations: Abdomen is soft. There is no mass.      Tenderness: There is no " abdominal tenderness. There is no guarding or rebound.      Hernia: No hernia is present.   Musculoskeletal:         General: Normal range of motion.   Skin:     General: Skin is warm and dry.      Coloration: Skin is not pale.      Findings: No erythema or rash.   Neurological:      Mental Status: She is alert and oriented to person, place, and time.   Psychiatric:         Behavior: Behavior normal.         Thought Content: Thought content normal.         Judgment: Judgment normal.          Assessment/Plan   Diagnoses and all orders for this visit:    1. Personal history of colonic polyps (Primary)    2. Personal history of malignant neoplasm of rectum, rectosigmoid junction, and anus        * Surgery not found *     Diagnosis Plan   1. Personal history of colonic polyps     2. Personal history of malignant neoplasm of rectum, rectosigmoid junction, and anus         Anticipated Surgical Procedure:  No orders of the defined types were placed in this encounter.      The risks, benefits, and alternatives of this procedure have been discussed with the patient or the responsible party- the patient understands and agrees to proceed.

## 2021-07-08 NOTE — PATIENT INSTRUCTIONS
MyPlate from USDA    MyPlate is an outline of a general healthy diet based on the 2010 Dietary Guidelines for Americans, from the U.S. Department of Agriculture (USDA). It sets guidelines for how much food you should eat from each food group based on your age, sex, and level of physical activity.  What are tips for following MyPlate?  To follow MyPlate recommendations:  · Eat a wide variety of fruits and vegetables, grains, and protein foods.  · Serve smaller portions and eat less food throughout the day.  · Limit portion sizes to avoid overeating.  · Enjoy your food.  · Get at least 150 minutes of exercise every week. This is about 30 minutes each day, 5 or more days per week.  It can be difficult to have every meal look like MyPlate. Think about MyPlate as eating guidelines for an entire day, rather than each individual meal.  Fruits and vegetables  · Make half of your plate fruits and vegetables.  · Eat many different colors of fruits and vegetables each day.  · For a 2,000 calorie daily food plan, eat:  ? 2½ cups of vegetables every day.  ? 2 cups of fruit every day.  · 1 cup is equal to:  ? 1 cup raw or cooked vegetables.  ? 1 cup raw fruit.  ? 1 medium-sized orange, apple, or banana.  ? 1 cup 100% fruit or vegetable juice.  ? 2 cups raw leafy greens, such as lettuce, spinach, or kale.  ? ½ cup dried fruit.  Grains  · One fourth of your plate should be grains.  · Make at least half of the grains you eat each day whole grains.  · For a 2,000 calorie daily food plan, eat 6 oz of grains every day.  · 1 oz is equal to:  ? 1 slice bread.  ? 1 cup cereal.  ? ½ cup cooked rice, cereal, or pasta.  Protein  · One fourth of your plate should be protein.  · Eat a wide variety of protein foods, including meat, poultry, fish, eggs, beans, nuts, and tofu.  · For a 2,000 calorie daily food plan, eat 5½ oz of protein every day.  · 1 oz is equal to:  ? 1 oz meat, poultry, or fish.  ? ¼ cup cooked beans.  ? 1 egg.  ? ½ oz nuts  or seeds.  ? 1 Tbsp peanut butter.  Dairy  · Drink fat-free or low-fat (1%) milk.  · Eat or drink dairy as a side to meals.  · For a 2,000 calorie daily food plan, eat or drink 3 cups of dairy every day.  · 1 cup is equal to:  ? 1 cup milk, yogurt, cottage cheese, or soy milk (soy beverage).  ? 2 oz processed cheese.  ? 1½ oz natural cheese.  Fats, oils, salt, and sugars  · Only small amounts of oils are recommended.  · Avoid foods that are high in calories and low in nutritional value (empty calories), like foods high in fat or added sugars.  · Choose foods that are low in salt (sodium). Choose foods that have less than 140 milligrams (mg) of sodium per serving.  · Drink water instead of sugary drinks. Drink enough water each day to keep your urine pale yellow.  Where to find support  · Work with your health care provider or a nutrition specialist (dietitian) to develop a customized eating plan that is right for you.  · Download an papito (mobile application) to help you track your daily food intake.  Where to find more information  · Go to ChooseMyPlate.gov for more information.  Summary  · MyPlate is a general guideline for healthy eating from the USDA. It is based on the 2010 Dietary Guidelines for Americans.  · In general, fruits and vegetables should take up ½ of your plate, grains should take up ¼ of your plate, and protein should take up ¼ of your plate.  This information is not intended to replace advice given to you by your health care provider. Make sure you discuss any questions you have with your health care provider.  Document Revised: 05/21/2020 Document Reviewed: 03/19/2018  Elsevier Patient Education © 2021 Elsevier Inc.

## 2021-07-18 DIAGNOSIS — I10 ESSENTIAL HYPERTENSION: Chronic | ICD-10-CM

## 2021-07-19 RX ORDER — LISINOPRIL 40 MG/1
40 TABLET ORAL DAILY
Qty: 90 TABLET | Refills: 3 | Status: SHIPPED | OUTPATIENT
Start: 2021-07-19 | End: 2022-07-11 | Stop reason: SDUPTHER

## 2021-07-19 RX ORDER — SPIRONOLACTONE 25 MG/1
25 TABLET ORAL DAILY
Qty: 90 TABLET | Refills: 3 | Status: SHIPPED | OUTPATIENT
Start: 2021-07-19 | End: 2022-07-11 | Stop reason: SDUPTHER

## 2021-10-11 ENCOUNTER — LAB (OUTPATIENT)
Dept: LAB | Facility: HOSPITAL | Age: 56
End: 2021-10-11

## 2021-10-11 ENCOUNTER — TRANSCRIBE ORDERS (OUTPATIENT)
Dept: LAB | Facility: HOSPITAL | Age: 56
End: 2021-10-11

## 2021-10-11 DIAGNOSIS — R94.4 ABNORMAL RESULTS OF KIDNEY FUNCTION STUDIES: ICD-10-CM

## 2021-10-11 DIAGNOSIS — R94.4 ABNORMAL RESULTS OF KIDNEY FUNCTION STUDIES: Primary | ICD-10-CM

## 2021-10-11 PROCEDURE — 80069 RENAL FUNCTION PANEL: CPT

## 2021-10-11 PROCEDURE — 36415 COLL VENOUS BLD VENIPUNCTURE: CPT

## 2021-10-12 LAB
ALBUMIN SERPL-MCNC: 4.2 G/DL (ref 3.5–5.2)
ANION GAP SERPL CALCULATED.3IONS-SCNC: 11.2 MMOL/L (ref 5–15)
BUN SERPL-MCNC: 9 MG/DL (ref 6–20)
BUN/CREAT SERPL: 10.6 (ref 7–25)
CALCIUM SPEC-SCNC: 10.1 MG/DL (ref 8.6–10.5)
CHLORIDE SERPL-SCNC: 107 MMOL/L (ref 98–107)
CO2 SERPL-SCNC: 22.8 MMOL/L (ref 22–29)
CREAT SERPL-MCNC: 0.85 MG/DL (ref 0.57–1)
GFR SERPL CREATININE-BSD FRML MDRD: 69 ML/MIN/1.73
GLUCOSE SERPL-MCNC: 104 MG/DL (ref 65–99)
PHOSPHATE SERPL-MCNC: 3.7 MG/DL (ref 2.5–4.5)
POTASSIUM SERPL-SCNC: 4.5 MMOL/L (ref 3.5–5.2)
SODIUM SERPL-SCNC: 141 MMOL/L (ref 136–145)

## 2021-10-25 ENCOUNTER — OFFICE VISIT (OUTPATIENT)
Dept: FAMILY MEDICINE CLINIC | Facility: CLINIC | Age: 56
End: 2021-10-25

## 2021-10-25 VITALS
DIASTOLIC BLOOD PRESSURE: 74 MMHG | BODY MASS INDEX: 32.02 KG/M2 | HEIGHT: 63 IN | WEIGHT: 180.7 LBS | SYSTOLIC BLOOD PRESSURE: 122 MMHG

## 2021-10-25 DIAGNOSIS — Z13.220 SCREENING CHOLESTEROL LEVEL: ICD-10-CM

## 2021-10-25 DIAGNOSIS — Z23 NEED FOR VACCINATION: ICD-10-CM

## 2021-10-25 DIAGNOSIS — I45.6 WOLFF-PARKINSON-WHITE (WPW) PATTERN: Chronic | ICD-10-CM

## 2021-10-25 DIAGNOSIS — E66.09 CLASS 1 OBESITY DUE TO EXCESS CALORIES WITH SERIOUS COMORBIDITY AND BODY MASS INDEX (BMI) OF 32.0 TO 32.9 IN ADULT: Chronic | ICD-10-CM

## 2021-10-25 DIAGNOSIS — I10 ESSENTIAL HYPERTENSION: Primary | Chronic | ICD-10-CM

## 2021-10-25 DIAGNOSIS — F32.4 MAJOR DEPRESSIVE DISORDER WITH SINGLE EPISODE, IN PARTIAL REMISSION (HCC): Chronic | ICD-10-CM

## 2021-10-25 DIAGNOSIS — Z12.31 SCREENING MAMMOGRAM FOR BREAST CANCER: ICD-10-CM

## 2021-10-25 PROBLEM — Z85.038 PERSONAL HISTORY OF COLON CANCER: Chronic | Status: ACTIVE | Noted: 2021-05-28

## 2021-10-25 PROCEDURE — 90471 IMMUNIZATION ADMIN: CPT | Performed by: FAMILY MEDICINE

## 2021-10-25 PROCEDURE — 90686 IIV4 VACC NO PRSV 0.5 ML IM: CPT | Performed by: FAMILY MEDICINE

## 2021-10-25 PROCEDURE — 99213 OFFICE O/P EST LOW 20 MIN: CPT | Performed by: FAMILY MEDICINE

## 2021-10-25 NOTE — PROGRESS NOTES
Subjective   Pati Sotelo is a 56 y.o. female.  Reevaluation mild hypertension obesity mild dysthymia history of colon carcinoma diagnoses below [employer] lost 5 pounds try to remain active blood pressure holding last creatinine actually has returned to normal for some reason lab did not do lipid panel due with next blood draw.  Continue to see GI medicine for routine colonoscopies.  Overall feels well.  Requesting flu vaccine.    History of Present Illness   HPI    The following portions of the patient's history were reviewed and updated as appropriate: allergies, current medications, past family history, past medical history, past social history, past surgical history and problem list.    Review of Systems  Review of Systems   Constitutional: Negative for activity change, appetite change, fatigue and unexpected weight change.   HENT: Negative for trouble swallowing and voice change.    Eyes: Negative for redness and visual disturbance.   Respiratory: Negative for cough and wheezing.    Cardiovascular: Negative for chest pain and palpitations.   Gastrointestinal: Negative for abdominal pain, constipation, diarrhea, nausea and vomiting.   Genitourinary: Negative for urgency.   Musculoskeletal: Positive for arthralgias. Negative for joint swelling.   Neurological: Negative for syncope and headaches.   Hematological: Negative for adenopathy.   Psychiatric/Behavioral: Negative for sleep disturbance.       Objective   Physical Exam  Physical Exam  Constitutional:       Appearance: She is well-developed.   HENT:      Head: Normocephalic.   Eyes:      Pupils: Pupils are equal, round, and reactive to light.   Neck:      Thyroid: No thyromegaly.   Cardiovascular:      Rate and Rhythm: Normal rate and regular rhythm.      Heart sounds: Normal heart sounds. No murmur heard.  No friction rub. No gallop.    Pulmonary:      Breath sounds: Normal breath sounds.   Abdominal:      General: There is no distension.       "Palpations: Abdomen is soft. There is no mass.      Tenderness: There is no abdominal tenderness.   Musculoskeletal:         General: Normal range of motion.      Cervical back: Normal range of motion.      Comments: Get up and go 3 to 5 seconds gait normal 2+ pulses   Skin:     General: Skin is warm and dry.   Neurological:      Mental Status: She is alert and oriented to person, place, and time.      Deep Tendon Reflexes: Reflexes are normal and symmetric.   Psychiatric:         Attention and Perception: Attention normal.         Mood and Affect: Mood normal.         Speech: Speech normal.         Behavior: Behavior normal.         Thought Content: Thought content normal.         Cognition and Memory: Cognition normal.      Comments: Normal affect           Visit Vitals  /74   Ht 160 cm (63\")   Wt 82 kg (180 lb 11.2 oz)   BMI 32.01 kg/m²     Body mass index is 32.01 kg/m².    /74   Ht 160 cm (63\")   Wt 82 kg (180 lb 11.2 oz)   BMI 32.01 kg/m²     Assessment/Plan   Diagnoses and all orders for this visit:    1. Essential hypertension (Primary)    2. Peter-Parkinson-White (WPW) pattern    3. Class 1 obesity due to excess calories with serious comorbidity and body mass index (BMI) of 32.0 to 32.9 in adult    4. Major depressive disorder with single episode, in partial remission (HCC)    5. Need for vaccination  -     FluLaval/Fluarix/Fluzone >6 Months    6. Screening mammogram for breast cancer  -     Mammo Screening Digital Tomosynthesis Bilateral With CAD; Future    7. Screening cholesterol level  -     Lipid Panel With LDL / HDL Ratio; Future    Obesity counseled mainly on lifestyle measures infection prevention hydration diagnoses as above plan as above recheck 6 months  "

## 2021-12-16 ENCOUNTER — OFFICE VISIT (OUTPATIENT)
Dept: ONCOLOGY | Facility: CLINIC | Age: 56
End: 2021-12-16

## 2021-12-16 ENCOUNTER — LAB (OUTPATIENT)
Dept: ONCOLOGY | Facility: HOSPITAL | Age: 56
End: 2021-12-16

## 2021-12-16 VITALS
OXYGEN SATURATION: 96 % | WEIGHT: 178 LBS | BODY MASS INDEX: 31.53 KG/M2 | HEART RATE: 66 BPM | SYSTOLIC BLOOD PRESSURE: 166 MMHG | RESPIRATION RATE: 18 BRPM | DIASTOLIC BLOOD PRESSURE: 93 MMHG

## 2021-12-16 DIAGNOSIS — Z85.038 PERSONAL HISTORY OF COLON CANCER: Primary | ICD-10-CM

## 2021-12-16 DIAGNOSIS — Z13.220 SCREENING CHOLESTEROL LEVEL: ICD-10-CM

## 2021-12-16 DIAGNOSIS — Z85.038 H/O COLON CANCER, STAGE II: ICD-10-CM

## 2021-12-16 LAB
ALBUMIN SERPL-MCNC: 4.4 G/DL (ref 3.5–5.2)
ALBUMIN/GLOB SERPL: 1.9 G/DL
ALP SERPL-CCNC: 106 U/L (ref 39–117)
ALT SERPL W P-5'-P-CCNC: 25 U/L (ref 1–33)
ANION GAP SERPL CALCULATED.3IONS-SCNC: 10 MMOL/L (ref 5–15)
AST SERPL-CCNC: 27 U/L (ref 1–32)
BASOPHILS # BLD AUTO: 0.05 10*3/MM3 (ref 0–0.2)
BASOPHILS NFR BLD AUTO: 0.7 % (ref 0–1.5)
BILIRUB SERPL-MCNC: 0.4 MG/DL (ref 0–1.2)
BUN SERPL-MCNC: 10 MG/DL (ref 6–20)
BUN/CREAT SERPL: 10.5 (ref 7–25)
CALCIUM SPEC-SCNC: 10.1 MG/DL (ref 8.6–10.5)
CEA SERPL-MCNC: 1.43 NG/ML
CHLORIDE SERPL-SCNC: 107 MMOL/L (ref 98–107)
CHOLEST SERPL-MCNC: 210 MG/DL (ref 0–200)
CO2 SERPL-SCNC: 27 MMOL/L (ref 22–29)
CREAT SERPL-MCNC: 0.95 MG/DL (ref 0.57–1)
DEPRECATED RDW RBC AUTO: 38 FL (ref 37–54)
EOSINOPHIL # BLD AUTO: 0.17 10*3/MM3 (ref 0–0.4)
EOSINOPHIL NFR BLD AUTO: 2.5 % (ref 0.3–6.2)
ERYTHROCYTE [DISTWIDTH] IN BLOOD BY AUTOMATED COUNT: 12.2 % (ref 12.3–15.4)
GFR SERPL CREATININE-BSD FRML MDRD: 61 ML/MIN/1.73
GLOBULIN UR ELPH-MCNC: 2.3 GM/DL
GLUCOSE SERPL-MCNC: 85 MG/DL (ref 65–99)
HCT VFR BLD AUTO: 41 % (ref 34–46.6)
HDLC SERPL-MCNC: 48 MG/DL (ref 40–60)
HGB BLD-MCNC: 14.3 G/DL (ref 12–15.9)
IMM GRANULOCYTES # BLD AUTO: 0.02 10*3/MM3 (ref 0–0.05)
IMM GRANULOCYTES NFR BLD AUTO: 0.3 % (ref 0–0.5)
LDLC SERPL CALC-MCNC: 126 MG/DL (ref 0–100)
LDLC/HDLC SERPL: 2.53 {RATIO}
LYMPHOCYTES # BLD AUTO: 1.93 10*3/MM3 (ref 0.7–3.1)
LYMPHOCYTES NFR BLD AUTO: 27.8 % (ref 19.6–45.3)
MCH RBC QN AUTO: 29.9 PG (ref 26.6–33)
MCHC RBC AUTO-ENTMCNC: 34.9 G/DL (ref 31.5–35.7)
MCV RBC AUTO: 85.8 FL (ref 79–97)
MONOCYTES # BLD AUTO: 0.62 10*3/MM3 (ref 0.1–0.9)
MONOCYTES NFR BLD AUTO: 8.9 % (ref 5–12)
NEUTROPHILS NFR BLD AUTO: 4.14 10*3/MM3 (ref 1.7–7)
NEUTROPHILS NFR BLD AUTO: 59.8 % (ref 42.7–76)
NRBC BLD AUTO-RTO: 0 /100 WBC (ref 0–0.2)
PLATELET # BLD AUTO: 329 10*3/MM3 (ref 140–450)
PMV BLD AUTO: 9.8 FL (ref 6–12)
POTASSIUM SERPL-SCNC: 4.3 MMOL/L (ref 3.5–5.2)
PROT SERPL-MCNC: 6.7 G/DL (ref 6–8.5)
RBC # BLD AUTO: 4.78 10*6/MM3 (ref 3.77–5.28)
SODIUM SERPL-SCNC: 144 MMOL/L (ref 136–145)
TRIGL SERPL-MCNC: 203 MG/DL (ref 0–150)
VLDLC SERPL-MCNC: 36 MG/DL (ref 5–40)
WBC NRBC COR # BLD: 6.93 10*3/MM3 (ref 3.4–10.8)

## 2021-12-16 PROCEDURE — 82378 CARCINOEMBRYONIC ANTIGEN: CPT

## 2021-12-16 PROCEDURE — 80061 LIPID PANEL: CPT

## 2021-12-16 PROCEDURE — 99212 OFFICE O/P EST SF 10 MIN: CPT | Performed by: NURSE PRACTITIONER

## 2021-12-16 PROCEDURE — 85025 COMPLETE CBC W/AUTO DIFF WBC: CPT

## 2021-12-16 PROCEDURE — 80053 COMPREHEN METABOLIC PANEL: CPT

## 2021-12-16 PROCEDURE — G0463 HOSPITAL OUTPT CLINIC VISIT: HCPCS | Performed by: NURSE PRACTITIONER

## 2021-12-17 NOTE — PROGRESS NOTES
DATE OF VISIT: 12/16/2021      REASON FOR VISIT:  Follow-up surveillance for colon cancer      HISTORY OF PRESENT ILLNESS:   56 yr old female diagnosed with Stage II colon cancer in 2009, T3N0M0. She completed 6 months of Xeloda chemotherapy in the adjuvant setting.She is being followed yearly. She denies any changes with her bowels, denies any blood in stools.   She has colonoscopy in June 2021 with recommendation to repeat in one year due to presence of tubular adenoma.       Past Medical History, Past Surgical History, Social History, Family History have been reviewed and are without significant changes except as mentioned.    Review of Systems   A comprehensive 14 point review of systems was performed and was negative except as mentioned.    Medications:  The current medication list was reviewed in the EMR    ALLERGIES:    Allergies   Allergen Reactions   • Tums [Calcium Carbonate Antacid] Anaphylaxis   • Sulfa Antibiotics Rash       Objective      Vitals:    12/16/21 1334   BP: 166/93   Pulse: 66   Resp: 18   SpO2: 96%   Weight: 80.7 kg (178 lb)   PainSc: 0-No pain     Current Status 12/18/2019   ECOG score 0       Physical Exam  General: alert and oriented no acute distress  Lungs: CTAB no crackles or wheezing  Card: RRR no murmur  Abd: soft non tender non distended  Ext; no edema    RECENT LABS:  Glucose   Date Value Ref Range Status   12/16/2021 85 65 - 99 mg/dL Final     Sodium   Date Value Ref Range Status   12/16/2021 144 136 - 145 mmol/L Final     Potassium   Date Value Ref Range Status   12/16/2021 4.3 3.5 - 5.2 mmol/L Final     CO2   Date Value Ref Range Status   12/16/2021 27.0 22.0 - 29.0 mmol/L Final     Chloride   Date Value Ref Range Status   12/16/2021 107 98 - 107 mmol/L Final     Anion Gap   Date Value Ref Range Status   12/16/2021 10.0 5.0 - 15.0 mmol/L Final     Creatinine   Date Value Ref Range Status   12/16/2021 0.95 0.57 - 1.00 mg/dL Final     BUN   Date Value Ref Range Status    12/16/2021 10 6 - 20 mg/dL Final     BUN/Creatinine Ratio   Date Value Ref Range Status   12/16/2021 10.5 7.0 - 25.0 Final     Calcium   Date Value Ref Range Status   12/16/2021 10.1 8.6 - 10.5 mg/dL Final     eGFR Non  Amer   Date Value Ref Range Status   12/16/2021 61 >60 mL/min/1.73 Final     Alkaline Phosphatase   Date Value Ref Range Status   12/16/2021 106 39 - 117 U/L Final     Total Protein   Date Value Ref Range Status   12/16/2021 6.7 6.0 - 8.5 g/dL Final     ALT (SGPT)   Date Value Ref Range Status   12/16/2021 25 1 - 33 U/L Final     AST (SGOT)   Date Value Ref Range Status   12/16/2021 27 1 - 32 U/L Final     Total Bilirubin   Date Value Ref Range Status   12/16/2021 0.4 0.0 - 1.2 mg/dL Final     Albumin   Date Value Ref Range Status   12/16/2021 4.40 3.50 - 5.20 g/dL Final     Globulin   Date Value Ref Range Status   12/16/2021 2.3 gm/dL Final     Lab Results   Component Value Date    WBC 6.93 12/16/2021    HGB 14.3 12/16/2021    HCT 41.0 12/16/2021    MCV 85.8 12/16/2021     12/16/2021     Lab Results   Component Value Date    NEUTROABS 4.14 12/16/2021     Lab Results   Component Value Date    CEA 1.43 12/16/2021               RADIOLOGY DATA :  Mammo Screening Digital Tomosynthesis Bilateral With CAD    Result Date: 12/16/2021  1.  No mammographic evidence of malignancy - no interval change. Follow-up recommendations:  annual mammographic surveillance. BIRADS: 2, Benign finding(s)  Electronically signed by:  Kd Lim MD  12/16/2021 9:42 AM CST Workstation: [id number]          Assessment/Plan       Stage II colon cancer, diagnosed in 2009, status post surgery and 6 months adjuvant Xeloda chemotherapy.   -recent colonoscopy in June 2021 showed tubular adenoma; she is to have repeat in one year.  -labs reviewed today and WNL  -will plan to see her again in one year with repeat CBC,CMP and CEA.           PHQ-9 Total Score: 0     Pati Sotelo reports a pain score of 0.  Given her  pain assessment as noted, treatment options were discussed and the following options were decided upon as a follow-up plan to address the patient's pain: no pain today.         Gracia Galvan, APRN  12/17/2021  09:36 CST        Part of this note may be an electronic transcription/translation of spoken language to printed text using the Dragon Dictation System.          CC:

## 2022-02-10 ENCOUNTER — OFFICE VISIT (OUTPATIENT)
Dept: FAMILY MEDICINE CLINIC | Facility: CLINIC | Age: 57
End: 2022-02-10

## 2022-02-10 VITALS
HEIGHT: 63 IN | BODY MASS INDEX: 32.21 KG/M2 | DIASTOLIC BLOOD PRESSURE: 82 MMHG | WEIGHT: 181.8 LBS | SYSTOLIC BLOOD PRESSURE: 132 MMHG

## 2022-02-10 DIAGNOSIS — M25.561 BILATERAL CHRONIC KNEE PAIN: Primary | ICD-10-CM

## 2022-02-10 DIAGNOSIS — G89.29 CHRONIC PAIN OF BOTH KNEES: ICD-10-CM

## 2022-02-10 DIAGNOSIS — M25.562 CHRONIC PAIN OF BOTH KNEES: ICD-10-CM

## 2022-02-10 DIAGNOSIS — G89.29 BILATERAL CHRONIC KNEE PAIN: Primary | ICD-10-CM

## 2022-02-10 DIAGNOSIS — M25.561 CHRONIC PAIN OF BOTH KNEES: ICD-10-CM

## 2022-02-10 DIAGNOSIS — M25.562 BILATERAL CHRONIC KNEE PAIN: Primary | ICD-10-CM

## 2022-02-10 DIAGNOSIS — E66.09 CLASS 1 OBESITY DUE TO EXCESS CALORIES WITH SERIOUS COMORBIDITY AND BODY MASS INDEX (BMI) OF 32.0 TO 32.9 IN ADULT: Chronic | ICD-10-CM

## 2022-02-10 DIAGNOSIS — M62.81 QUADRICEPS WEAKNESS: ICD-10-CM

## 2022-02-10 PROCEDURE — 99213 OFFICE O/P EST LOW 20 MIN: CPT | Performed by: FAMILY MEDICINE

## 2022-02-10 RX ORDER — NAPROXEN 500 MG/1
TABLET ORAL
Qty: 60 TABLET | Refills: 2 | Status: SHIPPED | OUTPATIENT
Start: 2022-02-10 | End: 2022-03-17 | Stop reason: ALTCHOICE

## 2022-02-10 NOTE — PROGRESS NOTES
Subjective   Pati Sotelo is a 56 y.o. female.  Requesting evaluation bilateral progressive knee pain.  Going out for quite some time.  Worse when she walks at work.  Thigh muscle pain bilateral knee pain.  Requesting help with disability.  We have counseled on need for visiting orthopedics to get evaluated knees before giving her obtaining any type of disability.  Has had known DJD mild on old x-rays.  History noted.    History of Present Illness   HPI    The following portions of the patient's history were reviewed and updated as appropriate: allergies, current medications, past family history, past medical history, past social history, past surgical history and problem list.    Review of Systems  Review of Systems   Constitutional: Negative for activity change, appetite change, fatigue and unexpected weight change.   HENT: Negative for trouble swallowing and voice change.    Eyes: Negative for redness and visual disturbance.   Respiratory: Negative for cough and wheezing.    Cardiovascular: Negative for chest pain and palpitations.   Gastrointestinal: Negative for abdominal pain, constipation, diarrhea, nausea and vomiting.   Genitourinary: Negative for urgency.   Musculoskeletal: Positive for arthralgias and gait problem. Negative for joint swelling.   Neurological: Negative for syncope and headaches.   Hematological: Negative for adenopathy.   Psychiatric/Behavioral: Negative for sleep disturbance.       Objective   Physical Exam  Physical Exam  Constitutional:       Appearance: Normal appearance. She is obese.   Musculoskeletal:      Right knee: Bony tenderness present. Tenderness present over the medial joint line and lateral joint line.      Left knee: Bony tenderness present. Tenderness present over the medial joint line and lateral joint line.      Comments: Bilateral lower extremities show mild bilateral thigh muscle weakness with standing.  Minimal crepitance to range of motion of the knees no joint  "effusion bilateral epicondyles are tender to palpation.  2+ pulses.  Get up and go 3 to 5 seconds.   Neurological:      Mental Status: She is alert.   Psychiatric:         Mood and Affect: Affect is blunt.         Speech: Speech is delayed.         Behavior: Behavior is slowed.           Visit Vitals  /82   Ht 160 cm (63\")   Wt 82.5 kg (181 lb 12.8 oz)   BMI 32.20 kg/m²     Body mass index is 32.2 kg/m².  /82   Ht 160 cm (63\")   Wt 82.5 kg (181 lb 12.8 oz)   BMI 32.20 kg/m²       Assessment/Plan   Diagnoses and all orders for this visit:    1. Bilateral chronic knee pain (Primary)  -     Ambulatory Referral to Orthopedic Surgery    2. Quadriceps weakness  -     Ambulatory Referral to Orthopedic Surgery    3. Class 1 obesity due to excess calories with serious comorbidity and body mass index (BMI) of 32.0 to 32.9 in adult  -     Ambulatory Referral to Orthopedic Surgery    4. Chronic pain of both knees  -     naproxen (NAPROSYN) 500 MG tablet; 1 twice daily with food as needed knee pain only  Dispense: 60 tablet; Refill: 2  -     Ambulatory Referral to Orthopedic Surgery     on wt loss measures and programs  Counseled on need for orthopedic evaluation for same.  Counseled on the need for same.  Continue conservative therapy orders as above  "

## 2022-02-16 DIAGNOSIS — M25.561 PAIN IN BOTH KNEES, UNSPECIFIED CHRONICITY: Primary | ICD-10-CM

## 2022-02-16 DIAGNOSIS — M25.562 PAIN IN BOTH KNEES, UNSPECIFIED CHRONICITY: Primary | ICD-10-CM

## 2022-02-17 ENCOUNTER — OFFICE VISIT (OUTPATIENT)
Dept: ORTHOPEDIC SURGERY | Facility: CLINIC | Age: 57
End: 2022-02-17

## 2022-02-17 VITALS — WEIGHT: 183 LBS | BODY MASS INDEX: 32.43 KG/M2 | HEIGHT: 63 IN

## 2022-02-17 DIAGNOSIS — G89.29 CHRONIC PAIN OF BOTH KNEES: Primary | ICD-10-CM

## 2022-02-17 DIAGNOSIS — M25.562 CHRONIC PAIN OF BOTH KNEES: Primary | ICD-10-CM

## 2022-02-17 DIAGNOSIS — M25.561 CHRONIC PAIN OF BOTH KNEES: Primary | ICD-10-CM

## 2022-02-17 DIAGNOSIS — M17.0 BILATERAL PRIMARY OSTEOARTHRITIS OF KNEE: ICD-10-CM

## 2022-02-17 PROCEDURE — 99214 OFFICE O/P EST MOD 30 MIN: CPT | Performed by: NURSE PRACTITIONER

## 2022-02-17 PROCEDURE — 20610 DRAIN/INJ JOINT/BURSA W/O US: CPT | Performed by: NURSE PRACTITIONER

## 2022-02-17 RX ORDER — LIDOCAINE HYDROCHLORIDE 10 MG/ML
2 INJECTION, SOLUTION EPIDURAL; INFILTRATION; INTRACAUDAL; PERINEURAL
Status: COMPLETED | OUTPATIENT
Start: 2022-02-17 | End: 2022-02-17

## 2022-02-17 RX ORDER — TRIAMCINOLONE ACETONIDE 40 MG/ML
40 INJECTION, SUSPENSION INTRA-ARTICULAR; INTRAMUSCULAR
Status: COMPLETED | OUTPATIENT
Start: 2022-02-17 | End: 2022-02-17

## 2022-02-17 RX ADMIN — TRIAMCINOLONE ACETONIDE 40 MG: 40 INJECTION, SUSPENSION INTRA-ARTICULAR; INTRAMUSCULAR at 10:39

## 2022-02-17 RX ADMIN — LIDOCAINE HYDROCHLORIDE 2 ML: 10 INJECTION, SOLUTION EPIDURAL; INFILTRATION; INTRACAUDAL; PERINEURAL at 10:39

## 2022-02-17 NOTE — PROGRESS NOTES
Pati Sotelo is a 56 y.o. female   Primary provider:  George Ayala MD       Chief Complaint   Patient presents with   • Right Knee - Pain, Initial Evaluation   • Left Knee - Pain, Initial Evaluation     HISTORY OF PRESENT ILLNESS:    56-year-old female patient presents to office for evaluation of chronic bilateral knee pain.  Onset of pain occurred several years ago with no initial injury or incident.  She denies any prior knee injuries.  Patient has been evaluated on more than one occasion by her primary care physician, Dr. Ayala and was last seen by him regarding her bilateral knees on 2/10/2022.  She was referred to orthopedics for further evaluation.  She was previously prescribed naproxen 500 mg twice daily by her PCP.  Patient has continued to take naproxen but states she takes it on an as-needed basis.  Patient has previously tried physical therapy in June 2021 and attended 5/5 visits with good subjective improvement but she chose to discontinue PT at that time due to cost as she had $50 co-pays with each visit.  Patient states her bilateral knee pain has continued to progressively worsen over time.  Pain is described as constant and mild to moderate in severity.  Pain is described as aching, grinding, crushing, stabbing and burning in nature with intermittent joint swelling, joint stiffness and popping sensations.  She currently denies any catching/locking symptoms but has experienced those in the past intermittently.  Pain is worse with weightbearing activity, standing, walking and stair climbing.  Pain improves temporarily with rest, ice therapy, previous physical therapy and anti-inflammatory medications.  X-rays are performed in office today. Pain scale today is 2/10.     CONCURRENT MEDICAL HISTORY:    Past Medical History:   Diagnosis Date   • Abdominal pain     lower, hypo gastrium, s/p surgery      • Anomalous atrioventricular excitation    • C. difficile colitis     Hx   • Colon cancer (HCC)     • Conduction disorder of the heart      Supraventricular      • Depression    • Drug therapy    • Encounter for screening for malignant neoplasm of colon    • Essential hypertension    • Family history of malignant neoplasm of digestive organ    • History of colon polyps    • History of malignant neoplasm of colon     s/p resection in 2009 and chemo   • History of malignant neoplasm of gastrointestinal tract    • Hyperaldosteronism (HCC)    • Hyperplastic polyp of large intestine    • Hypertensive disorder    • Hypokalemia    • Intramural leiomyoma of uterus    • 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     Folate and B12 ordered      • Menstruation disorder     abnormal bleeding from female genital tract      • MRSA infection     Hx   • Screening for malignant neoplasm of breast    • Vitamin D deficiency    • Peter-Parkinson-White (WPW) pattern        Allergies   Allergen Reactions   • Tums [Calcium Carbonate Antacid] Anaphylaxis   • Sulfa Antibiotics Rash         Current Outpatient Medications:   •  amitriptyline (ELAVIL) 50 MG tablet, Take 1 tablet by mouth Every Night., Disp: 90 tablet, Rfl: 3  •  amLODIPine (NORVASC) 10 MG tablet, Take 1 tablet by mouth Daily., Disp: 90 tablet, Rfl: 3  •  lisinopril (PRINIVIL,ZESTRIL) 40 MG tablet, Take 1 tablet by mouth Daily., Disp: 90 tablet, Rfl: 3  •  metoprolol tartrate (LOPRESSOR) 50 MG tablet, Take 1 tablet by mouth 2 (Two) Times a Day., Disp: 180 tablet, Rfl: 3  •  naproxen (NAPROSYN) 500 MG tablet, 1 twice daily with food as needed knee pain only, Disp: 60 tablet, Rfl: 2  •  potassium chloride (KLOR-CON) 20 MEQ CR tablet, Take 1 tablet by mouth 2 (Two) Times a Day., Disp: 180 tablet, Rfl: 3  •  spironolactone (ALDACTONE) 25 MG tablet, Take 1 tablet by mouth Daily., Disp: 90 tablet, Rfl: 3  •  Vitamin D, Cholecalciferol, (CHOLECALCIFEROL) 10 MCG (400 UNIT) tablet, Take 400 Units by mouth Daily., Disp: , Rfl:     Past Surgical History:   Procedure Laterality Date   •  "CARDIAC ABLATION     • CARDIAC CATHETERIZATION  07/02/2007    No significant coronary artery disease with normal left ventricular function   • COLECTOMY PARTIAL / TOTAL  12/23/2009    Mid sigmoid obstructing colon cancer   • COLONOSCOPY N/A 6/29/2021    Procedure: COLONOSCOPY;  Surgeon: Jorge A Sheehan MD;  Location: [facility] ENDOSCOPY;  Service: Gastroenterology;  Laterality: N/A;   • ECG CONVERTED  09/29/2008    Sinus rhythm with short SC. Minimal voltage criteria for LVH, may be normal variant T wave abnormality, consider inferolateral ischemia. Prolonged QT Abnormal ECG   • ENDOSCOPY AND COLONOSCOPY  06/27/2016    External and internal hemorrhoids. The examination was otherwise normal. No specimens collected   • HERNIA REPAIR     • TUBAL ABDOMINAL LIGATION     • UPPER GASTROINTESTINAL ENDOSCOPY  12/23/2009       Family History   Problem Relation Age of Onset   • Depression Other    • Hypertension Other    • Cancer Father         colon   • Colon cancer Father    • Cancer Paternal Grandfather        Social History     Socioeconomic History   • Marital status:    Tobacco Use   • Smoking status: Never Smoker   • Smokeless tobacco: Never Used   Vaping Use   • Vaping Use: Never used   Substance and Sexual Activity   • Alcohol use: No   • Drug use: Never   • Sexual activity: Defer        Review of Systems   Constitutional: Negative.    HENT: Negative.    Eyes: Negative.    Respiratory: Negative.    Cardiovascular: Negative.    Gastrointestinal: Negative.    Endocrine: Negative.    Genitourinary: Negative.    Musculoskeletal: Positive for arthralgias, gait problem and joint swelling.        Joint stiffness. Bilateral knee pain.    Skin: Negative.    Allergic/Immunologic: Negative.    Hematological: Negative.    Psychiatric/Behavioral: Negative.        PHYSICAL EXAMINATION:       Ht 160 cm (63\")   Wt 83 kg (183 lb)   BMI 32.42 kg/m²     Physical Exam  Vitals reviewed.   Constitutional:       General: She is not " in acute distress.     Appearance: She is well-developed. She is not ill-appearing.   HENT:      Head: Normocephalic.   Pulmonary:      Effort: Pulmonary effort is normal. No respiratory distress.   Abdominal:      General: There is no distension.      Palpations: Abdomen is soft.   Musculoskeletal:         General: Tenderness (Mild, bilateral knees) present. No swelling, deformity or signs of injury.      Right knee: No effusion.      Instability Tests: Medial Amelia test negative and lateral Amelia test negative.      Left knee: No effusion.      Instability Tests: Medial Amelia test negative and lateral Amelia test negative.   Skin:     General: Skin is warm and dry.      Capillary Refill: Capillary refill takes less than 2 seconds.      Findings: No erythema.   Neurological:      Mental Status: She is alert and oriented to person, place, and time.      GCS: GCS eye subscore is 4. GCS verbal subscore is 5. GCS motor subscore is 6.   Psychiatric:         Speech: Speech normal.         Behavior: Behavior normal.         Thought Content: Thought content normal.         Judgment: Judgment normal.         GAIT:     []  Normal  [x]  Antalgic    Assistive device: [x]  None  []  Walker     []  Crutches  []  Cane     []  Wheelchair  []  Stretcher    Right Knee Exam     Tenderness   Right knee tenderness location: Mild, diffuse (anterior)    Range of Motion   Right knee extension: 2.   Flexion: 120     Tests   Amelia:  Medial - negative Lateral - negative  Varus: negative Valgus: negative    Other   Erythema: absent  Sensation: normal  Pulse: present  Swelling: none  Effusion: no effusion present    Comments:  Bony prominence noted at the tibial tubercle.      Left Knee Exam     Tenderness   Left knee tenderness location: Mild, diffuse (anterior)    Range of Motion   Extension: 0   Flexion: 120     Tests   Amelia:  Medial - negative Lateral - negative  Varus: negative Valgus: negative    Other   Erythema:  absent  Sensation: normal  Pulse: present  Swelling: none  Effusion: no effusion present    Comments:  Bony prominence noted at the tibial tubercle.            XR Knee 1 or 2 View Left    Result Date: 2/17/2022  Narrative: EXAM: Bilateral knee series (3 views) CLINICAL INDICATION: Bilateral knee pain COMPARISON: Right knee series 6/18/2021 FINDINGS: There is no acute fracture, dislocation or destructive lesion. There is mild prominence of the bilateral tibial spines and prominent marginal osteophytosis of the lateral compartment on the left. There is no osseous loose body. There is small left suprapatellar effusion. There is no significant soft tissue swelling or obvious subcutaneous gas.     Impression: CONCLUSION: Mild bilateral osteoarthritic changes and small left suprapatellar effusion. Electronically signed by:  Umberto Warner DO  2/17/2022 11:08 AM CST Workstation: [id number]    XR Knee Bilateral AP Standing    Result Date: 2/17/2022  Narrative: EXAM: Bilateral knee series (3 views) CLINICAL INDICATION: Bilateral knee pain COMPARISON: Right knee series 6/18/2021 FINDINGS: There is no acute fracture, dislocation or destructive lesion. There is mild prominence of the bilateral tibial spines and prominent marginal osteophytosis of the lateral compartment on the left. There is no osseous loose body. There is small left suprapatellar effusion. There is no significant soft tissue swelling or obvious subcutaneous gas.     Impression: CONCLUSION: Mild bilateral osteoarthritic changes and small left suprapatellar effusion. Electronically signed by:  Umberto Warner DO  2/17/2022 11:08 AM CST Workstation: [id number]    PROCEDURE: Right knee x-rays with five views     INDICATION: contusion to rt knee, M25.561 Pain in right knee     COMPARISON: None.     FINDINGS:     No fractures or acute osseous abnormalities.     Anterior enthesophytes along the superior and inferior aspect of  the patella.     Lateral joint space narrowing  and lateral osteophytes along the  lateral tibial margin.     IMPRESSION:  No acute osseous abnormalities.     Mild-to-moderate degenerative change lateral joint compartment  with joint space narrowing and osteophytes.     [id number]     Electronically signed by:  Johnny Harry MD  6/18/2021 1:18  PM CDT Workstation: [id number]      ASSESSMENT:    Diagnoses and all orders for this visit:    Chronic pain of both knees  -     Large Joint Arthrocentesis: R knee  -     Large Joint Arthrocentesis: L knee    Bilateral primary osteoarthritis of knee  -     Large Joint Arthrocentesis: R knee  -     Large Joint Arthrocentesis: L knee    PLAN    Large Joint Arthrocentesis: R knee  Date/Time: 2/17/2022 10:39 AM  Consent given by: patient  Timeout: Immediately prior to procedure a time out was called to verify the correct patient, procedure, equipment, support staff and site/side marked as required   Supporting Documentation  Indications: pain and diagnostic evaluation   Procedure Details  Location: knee - R knee  Preparation: Patient was prepped and draped in the usual sterile fashion  Needle size: 22 G  Approach: anterolateral  Medications administered: 40 mg triamcinolone acetonide 40 MG/ML; 2 mL lidocaine PF 1% 1 %  Patient tolerance: patient tolerated the procedure well with no immediate complications    Large Joint Arthrocentesis: L knee  Date/Time: 2/17/2022 10:39 AM  Consent given by: patient  Timeout: Immediately prior to procedure a time out was called to verify the correct patient, procedure, equipment, support staff and site/side marked as required   Supporting Documentation  Indications: pain and diagnostic evaluation   Procedure Details  Location: knee - L knee  Preparation: Patient was prepped and draped in the usual sterile fashion  Needle size: 22 G  Approach: anterolateral  Medications administered: 40 mg triamcinolone acetonide 40 MG/ML; 2 mL lidocaine PF 1% 1 %  Patient tolerance: patient tolerated the  procedure well with no immediate complications      AP standing x-ray of the bilateral knees with a lateral x-ray of the left knee performed in office today and reviewed with no acute findings noted.  Patient has mild to moderate osteoarthritic changes in both knees, slightly more pronounced in the right knee.  I have also independently reviewed the x-ray images of the right knee performed previously on 6/18/2021 with similar findings.  The patient complains of chronic and ongoing bilateral knee pain for several years that has progressively worsened over time.  She has no specific localized pain in either knee but states that both her knees hurt and they alternate which one is the worst.  She primarily has pain in the anterior aspects of both knees.  She works as a  and has increased pain with weightbearing activity, standing, walking, stair climbing, etc.  She has intermittent joint stiffness and joint swelling.  We discussed that her symptoms are consistent with osteoarthritic pain.  She is not currently having any mechanical pain/symptoms but states she has had some intermittent locking/catching symptoms in the past.  She is not having any instability symptoms.  We discussed MRI imaging at some point for further evaluation if needed but we will wait on this for now and start with some conservative care of her osteoarthritic pain.  Recommend proceeding today with intra-articular injections of steroid to the bilateral knees for management of joint pain/inflammation/swelling.  Patient tolerated the injections well.  We discussed the purpose of the injections as to control the inflammatory response associated with osteoarthritis and to help manage her pain and allow her to be more active.    Recommend the following:    -Rest and activity modification as tolerated and based on pain.  -Modified weightbearing of the affected extremity with use of a cane or walker as needed.  -Gradual progression of  weightbearing and activity as pain and swelling allow.  -Continue with home exercises for conditioning and strengthening of the bilateral knees/legs that she previously learned in her course of physical therapy.  -Elevation and ice therapy to the affected knee to minimize pain/swelling/inflammation.   -Continue with naproxen 500 mg twice daily as needed for knee pain as previously prescribed by her PCP.  Patient can also take Tylenol as needed for additional pain control.    Follow-up in 4 weeks for recheck.  Consider MRI imaging.  Consider trial of viscosupplementation.    Time spent of a minimum of 30 minutes including the face to face evaluation, reviewing of medical history and prior medial records, reviewing of diagnostic studies, ordering additional tests, documentation, patient education and coordination of care.     EMR Dragon/Transciption Disclaimer: Some of this note may be an electronic transcription/translation of spoken language to printed text.  The electronic translation of spoken language may permit erroneous, or at times, nonsensical words or phrases to be inadvertently transcribed. Although I have reviewed the note for such errors, some may still exist.     Return in about 4 weeks (around 3/17/2022) for Recheck.        This document has been electronically signed by YONY Burns on February 17, 2022 11:13 CST      YONY Burns

## 2022-03-11 DIAGNOSIS — I10 ESSENTIAL HYPERTENSION: ICD-10-CM

## 2022-03-11 RX ORDER — AMITRIPTYLINE HYDROCHLORIDE 50 MG/1
50 TABLET, FILM COATED ORAL NIGHTLY
Qty: 90 TABLET | Refills: 3 | Status: SHIPPED | OUTPATIENT
Start: 2022-03-11 | End: 2023-03-03 | Stop reason: SDUPTHER

## 2022-03-11 RX ORDER — METOPROLOL TARTRATE 50 MG/1
50 TABLET, FILM COATED ORAL 2 TIMES DAILY
Qty: 180 TABLET | Refills: 3 | Status: SHIPPED | OUTPATIENT
Start: 2022-03-11 | End: 2023-03-03 | Stop reason: SDUPTHER

## 2022-03-17 ENCOUNTER — OFFICE VISIT (OUTPATIENT)
Dept: ORTHOPEDIC SURGERY | Facility: CLINIC | Age: 57
End: 2022-03-17

## 2022-03-17 VITALS — WEIGHT: 183 LBS | BODY MASS INDEX: 32.43 KG/M2 | HEIGHT: 63 IN

## 2022-03-17 DIAGNOSIS — M23.91 INTERNAL DERANGEMENT OF RIGHT KNEE: ICD-10-CM

## 2022-03-17 DIAGNOSIS — M25.562 CHRONIC PAIN OF BOTH KNEES: Primary | ICD-10-CM

## 2022-03-17 DIAGNOSIS — M17.0 BILATERAL PRIMARY OSTEOARTHRITIS OF KNEE: ICD-10-CM

## 2022-03-17 DIAGNOSIS — M25.561 CHRONIC PAIN OF BOTH KNEES: Primary | ICD-10-CM

## 2022-03-17 DIAGNOSIS — M23.92 INTERNAL DERANGEMENT OF LEFT KNEE: ICD-10-CM

## 2022-03-17 DIAGNOSIS — G89.29 CHRONIC PAIN OF BOTH KNEES: Primary | ICD-10-CM

## 2022-03-17 PROCEDURE — 99214 OFFICE O/P EST MOD 30 MIN: CPT | Performed by: NURSE PRACTITIONER

## 2022-03-17 RX ORDER — MELOXICAM 15 MG/1
15 TABLET ORAL DAILY
Qty: 30 TABLET | Refills: 3 | Status: SHIPPED | OUTPATIENT
Start: 2022-03-17 | End: 2022-04-16

## 2022-03-17 NOTE — PROGRESS NOTES
"Pati Sotelo is a 56 y.o. female returns for     Chief Complaint   Patient presents with   • Right Knee - Follow-up, Pain   • Left Knee - Follow-up, Pain     HISTORY OF PRESENT ILLNESS: Patient presents to office for follow-up of chronic bilateral knee pain.  Onset of pain occurred several years ago with no initial injury or incident.  Patient established care with orthopedics on 2/17/2022 and was given intra-articular injections of steroid at that time, which she states have not improved her knee pain.  Previous conservative treatments have included oral NSAIDs, physical therapy, ice therapy and rest/activity modification.  Patient previously attended 5 visits of physical therapy but had to discontinue PT at that time due to the cost as she had $50 co-pays with each visit.  Patient continues to complain of persistent bilateral knee pain that she describes as \"burning\" in nature, primarily at the anterior aspects of both knees.  She currently denies any catching/locking symptoms but has experienced those in the past intermittently.  The patient continues to work as a  and states she has increased pain with weightbearing activity, including her work duties.  Patient states today that she is trying to get disability.  No new complaints or concerns noted since last office visit.  No falls or injuries reported since last office visit.  Pain scale today is 2/10.     CONCURRENT MEDICAL HISTORY:    The following portions of the patient's history were reviewed and updated as appropriate: allergies, current medications, past family history, past medical history, past social history, past surgical history and problem list.     ROS  No fevers or chills.  No chest pain or shortness of air.  No GI or  disturbances.  Bilateral knee pain.    PHYSICAL EXAMINATION:       Ht 160 cm (63\")   Wt 83 kg (183 lb)   BMI 32.42 kg/m²     Physical Exam  Vitals reviewed.   Constitutional:       General: She is not in acute " distress.     Appearance: She is well-developed. She is not ill-appearing.   HENT:      Head: Normocephalic.   Pulmonary:      Effort: Pulmonary effort is normal. No respiratory distress.   Abdominal:      General: There is no distension.      Palpations: Abdomen is soft.   Musculoskeletal:         General: Tenderness (Mild, bilateral knees) present. No swelling, deformity or signs of injury.      Right knee: No effusion.      Instability Tests: Medial Amelia test negative and lateral Amelia test negative.      Left knee: No effusion.      Instability Tests: Medial Amelia test negative and lateral Amelia test negative.   Skin:     General: Skin is warm and dry.      Capillary Refill: Capillary refill takes less than 2 seconds.      Findings: No erythema.   Neurological:      Mental Status: She is alert and oriented to person, place, and time.      GCS: GCS eye subscore is 4. GCS verbal subscore is 5. GCS motor subscore is 6.   Psychiatric:         Speech: Speech normal.         Behavior: Behavior normal.         Thought Content: Thought content normal.         Judgment: Judgment normal.         GAIT:     []  Normal  [x]  Antalgic    Assistive device: [x]  None  []  Walker     []  Crutches  []  Cane     []  Wheelchair  []  Stretcher    Right Knee Exam     Tenderness   Right knee tenderness location: Mild, diffuse (anterior)    Range of Motion   Right knee extension: 2.   Flexion: 120     Tests   Amelia:  Medial - negative Lateral - negative  Varus: negative Valgus: negative    Other   Erythema: absent  Sensation: normal  Pulse: present  Swelling: none  Effusion: no effusion present    Comments:  Bony prominence noted at the tibial tubercle.      Left Knee Exam     Tenderness   Left knee tenderness location: Mild, diffuse (anterior)    Range of Motion   Extension: 0   Flexion: 120     Tests   Amelia:  Medial - negative Lateral - negative  Varus: negative Valgus: negative    Other   Erythema:  absent  Sensation: normal  Pulse: present  Swelling: none  Effusion: no effusion present    Comments:  Bony prominence noted at the tibial tubercle.            XR Knee 1 or 2 View Left     Result Date: 2/17/2022  Narrative: EXAM: Bilateral knee series (3 views) CLINICAL INDICATION: Bilateral knee pain COMPARISON: Right knee series 6/18/2021 FINDINGS: There is no acute fracture, dislocation or destructive lesion. There is mild prominence of the bilateral tibial spines and prominent marginal osteophytosis of the lateral compartment on the left. There is no osseous loose body. There is small left suprapatellar effusion. There is no significant soft tissue swelling or obvious subcutaneous gas.      Impression: CONCLUSION: Mild bilateral osteoarthritic changes and small left suprapatellar effusion. Electronically signed by:  Umberto Warner DO  2/17/2022 11:08 AM CST Workstation: [id number]     XR Knee Bilateral AP Standing     Result Date: 2/17/2022  Narrative: EXAM: Bilateral knee series (3 views) CLINICAL INDICATION: Bilateral knee pain COMPARISON: Right knee series 6/18/2021 FINDINGS: There is no acute fracture, dislocation or destructive lesion. There is mild prominence of the bilateral tibial spines and prominent marginal osteophytosis of the lateral compartment on the left. There is no osseous loose body. There is small left suprapatellar effusion. There is no significant soft tissue swelling or obvious subcutaneous gas.      Impression: CONCLUSION: Mild bilateral osteoarthritic changes and small left suprapatellar effusion. Electronically signed by:  Umberto Warner DO  2/17/2022 11:08 AM CST Workstation: [id number]     PROCEDURE: Right knee x-rays with five views     INDICATION: contusion to rt knee, M25.561 Pain in right knee     COMPARISON: None.     FINDINGS:     No fractures or acute osseous abnormalities.     Anterior enthesophytes along the superior and inferior aspect of  the patella.     Lateral joint space  narrowing and lateral osteophytes along the  lateral tibial margin.     IMPRESSION:  No acute osseous abnormalities.     Mild-to-moderate degenerative change lateral joint compartment  with joint space narrowing and osteophytes.     [id number]     Electronically signed by:  Johnny Harry MD  6/18/2021 1:18  PM CDT Workstation: [id number]      ASSESSMENT:    Diagnoses and all orders for this visit:    Chronic pain of both knees  -     MRI Knee Left Without Contrast; Future  -     MRI Knee Right Without Contrast; Future    Bilateral primary osteoarthritis of knee  -     MRI Knee Left Without Contrast; Future  -     MRI Knee Right Without Contrast; Future    Internal derangement of right knee  -     MRI Knee Right Without Contrast; Future    Internal derangement of left knee  -     MRI Knee Left Without Contrast; Future    Other orders  -     meloxicam (Mobic) 15 MG tablet; Take 1 tablet by mouth Daily for 30 days.    PLAN    Patient continues to complain of persistent and chronic bilateral knee pain.  She complains of burning pain in the anterior aspects of both knees, which has been an ongoing issue for several years but has progressively worsened over time.  Unfortunately, the previous intra-articular injections of steroid given did not offer any pain improvement.  She has tried and failed multiple conservative treatments as described in the HPI above.  The patient does have prominence at both tibial tubercles.  We discussed the possibility of patellar tendonitis/tendinosis and/or degenerative tears.  Recommend MRIs of the bilateral knees to evaluate for patellar tendinitis, degenerative tears of the patellar tendons, internal derangement, meniscal tearing, chondromalacia/osteochondral defects and/or ligament injuries/insufficiencies.    Recommend the following:     -Rest and activity modification as tolerated and based on pain.  -Modified weightbearing of the affected extremity with use of a cane or walker as  needed.  -Gradual progression of weightbearing and activity as pain and swelling allow.  -Continue with home exercises for conditioning and strengthening of the bilateral knees/legs that she previously learned in her course of physical therapy.    Recommend starting a prescription oral NSAID for improved control of pain/inflammation. Meloxicam is prescribed today and naproxen is discontinued. Patient is instructed to take the medication daily. Patient is instructed to take the medication with food to minimize any potential GI upset. Patient is instructed not to take any additional NSAIDs, such as Ibuprofen or Aleve, while taking Meloxicam. Patient can also take Tylenol as needed for additional pain control.     Follow-up in 3 weeks for recheckand to discuss MRI results and further treatment recommendations.    Time spent of a minimum of 30 minutes including the face to face evaluation, reviewing of medical history and prior medial records, reviewing of diagnostic studies, ordering additional tests, prescription drug management, documentation, patient education and coordination of care.     EMR Dragon/Transciption Disclaimer: Some of this note may be an electronic transcription/translation of spoken language to printed text.  The electronic translation of spoken language may permit erroneous, or at times, nonsensical words or phrases to be inadvertently transcribed. Although I have reviewed the note for such errors, some may still exist.     Return in about 3 weeks (around 4/7/2022) for Recheck.        This document has been electronically signed by YONY Burns on March 20, 2022 20:43 CDT      YONY Burns

## 2022-03-20 PROBLEM — M23.92 INTERNAL DERANGEMENT OF LEFT KNEE: Status: ACTIVE | Noted: 2022-03-20

## 2022-03-20 PROBLEM — M23.91 INTERNAL DERANGEMENT OF RIGHT KNEE: Status: ACTIVE | Noted: 2022-03-20

## 2022-03-28 ENCOUNTER — APPOINTMENT (OUTPATIENT)
Dept: MRI IMAGING | Facility: HOSPITAL | Age: 57
End: 2022-03-28

## 2022-04-11 ENCOUNTER — OFFICE VISIT (OUTPATIENT)
Dept: ORTHOPEDIC SURGERY | Facility: CLINIC | Age: 57
End: 2022-04-11

## 2022-04-11 VITALS — BODY MASS INDEX: 33.31 KG/M2 | WEIGHT: 188 LBS | HEIGHT: 63 IN

## 2022-04-11 DIAGNOSIS — M25.562 CHRONIC PAIN OF BOTH KNEES: Primary | ICD-10-CM

## 2022-04-11 DIAGNOSIS — I10 ESSENTIAL HYPERTENSION: Chronic | ICD-10-CM

## 2022-04-11 DIAGNOSIS — G89.29 CHRONIC PAIN OF BOTH KNEES: Primary | ICD-10-CM

## 2022-04-11 DIAGNOSIS — M25.561 CHRONIC PAIN OF BOTH KNEES: Primary | ICD-10-CM

## 2022-04-11 DIAGNOSIS — M17.0 BILATERAL PRIMARY OSTEOARTHRITIS OF KNEE: ICD-10-CM

## 2022-04-11 PROCEDURE — 99213 OFFICE O/P EST LOW 20 MIN: CPT | Performed by: NURSE PRACTITIONER

## 2022-04-11 RX ORDER — AMLODIPINE BESYLATE 10 MG/1
10 TABLET ORAL DAILY
Qty: 90 TABLET | Refills: 3 | Status: SHIPPED | OUTPATIENT
Start: 2022-04-11 | End: 2023-03-30 | Stop reason: SDUPTHER

## 2022-04-11 NOTE — PROGRESS NOTES
Pati Sotelo is a 56 y.o. female returns for     Chief Complaint   Patient presents with   • Right Knee - Pain, Follow-up   • Left Knee - Follow-up, Pain     HISTORY OF PRESENT ILLNESS: Patient presents to office for follow-up of chronic bilateral knee pain.  She currently has worse pain in the right knee but states that sometimes the severity alternates between her knees.  Onset of pain occurred several years ago with no initial injury or incident.  Patient established care with orthopedics on 2/17/2022 was given intra-articular injections of steroid at that time, which she initially stated did not offer any improvement but today she reports that she believes the injections have offered some pain improvement.  Additional conservative treatments have included oral NSAIDs, physical therapy, ice therapy and rest/activity modification.  Patient previously attended 5 visits of physical therapy but had to discontinue PT at that time due to the cost as she had $50 co-pays with each visit.  MRIs were ordered at last office visit to evaluate for possible internal derangement, which were approved and scheduled but the patient had to cancel her MRIs as it was going to cost her $1000 out-of-pocket due to her deductible not being met for this year.  Patient was started on Meloxicam at last office visit and she states this does seem to offer some pain improvement in her bilateral knees.  She is tolerating the Meloxicam well with no known adverse effects.  The patient has remained off work for concentrated period of rest, which she states has also been beneficial.  She had previously reported that she was going to try to get disability but today she states she would like to try to return to work as she plans to retire in the next 2 years.  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  No new  "complaints or concerns noted since last office visit.  No falls or injuries reported since last office visit.  Pain scale today is 1/10.     CONCURRENT MEDICAL HISTORY:    The following portions of the patient's history were reviewed and updated as appropriate: allergies, current medications, past family history, past medical history, past social history, past surgical history and problem list.     ROS  No fevers or chills.  No chest pain or shortness of air.  No GI or  disturbances.  Bilateral knee pain.     PHYSICAL EXAMINATION:       Ht 160 cm (63\")   Wt 85.3 kg (188 lb)   BMI 33.30 kg/m²     Physical Exam  Vitals reviewed.   Constitutional:       General: She is not in acute distress.     Appearance: She is well-developed. She is not ill-appearing.   HENT:      Head: Normocephalic.   Pulmonary:      Effort: Pulmonary effort is normal. No respiratory distress.   Abdominal:      General: There is no distension.      Palpations: Abdomen is soft.   Musculoskeletal:         General: Tenderness (Mild, bilateral knees) present. No swelling, deformity or signs of injury.      Right knee: No effusion.      Instability Tests: Medial Amelia test negative and lateral Amelia test negative.      Left knee: No effusion.      Instability Tests: Medial Amelia test negative and lateral Amelia test negative.   Skin:     General: Skin is warm and dry.      Capillary Refill: Capillary refill takes less than 2 seconds.      Findings: No erythema.   Neurological:      Mental Status: She is alert and oriented to person, place, and time.      GCS: GCS eye subscore is 4. GCS verbal subscore is 5. GCS motor subscore is 6.   Psychiatric:         Speech: Speech normal.         Behavior: Behavior normal.         Thought Content: Thought content normal.         Judgment: Judgment normal.         GAIT:     []  Normal  [x]  Antalgic (mild)    Assistive device: [x]  None  []  Walker     []  Crutches  []  Cane     []  Wheelchair  []  " Stretcher    Right Knee Exam     Tenderness   Right knee tenderness location: Mild, diffuse (anterior)    Range of Motion   Right knee extension: 2.   Flexion: 120     Tests   Amelia:  Medial - negative Lateral - negative  Varus: negative Valgus: negative    Other   Erythema: absent  Sensation: normal  Pulse: present  Swelling: none  Effusion: no effusion present    Comments:  Bony prominence noted at the tibial tubercle.      Left Knee Exam     Tenderness   Left knee tenderness location: Mild, diffuse (anterior)    Range of Motion   Extension: 0   Flexion: 120     Tests   Amelia:  Medial - negative Lateral - negative  Varus: negative Valgus: negative    Other   Erythema: absent  Sensation: normal  Pulse: present  Swelling: none  Effusion: no effusion present    Comments:  Bony prominence noted at the tibial tubercle.            XR Knee 1 or 2 View Left     Result Date: 2/17/2022  Narrative: EXAM: Bilateral knee series (3 views) CLINICAL INDICATION: Bilateral knee pain COMPARISON: Right knee series 6/18/2021 FINDINGS: There is no acute fracture, dislocation or destructive lesion. There is mild prominence of the bilateral tibial spines and prominent marginal osteophytosis of the lateral compartment on the left. There is no osseous loose body. There is small left suprapatellar effusion. There is no significant soft tissue swelling or obvious subcutaneous gas.      Impression: CONCLUSION: Mild bilateral osteoarthritic changes and small left suprapatellar effusion. Electronically signed by:  Umberto Warner DO  2/17/2022 11:08 AM CST Workstation: [id number]     XR Knee Bilateral AP Standing     Result Date: 2/17/2022  Narrative: EXAM: Bilateral knee series (3 views) CLINICAL INDICATION: Bilateral knee pain COMPARISON: Right knee series 6/18/2021 FINDINGS: There is no acute fracture, dislocation or destructive lesion. There is mild prominence of the bilateral tibial spines and prominent marginal osteophytosis of the  lateral compartment on the left. There is no osseous loose body. There is small left suprapatellar effusion. There is no significant soft tissue swelling or obvious subcutaneous gas.      Impression: CONCLUSION: Mild bilateral osteoarthritic changes and small left suprapatellar effusion. Electronically signed by:  Umberto Warner DO  2/17/2022 11:08 AM CST Workstation: [id number]     PROCEDURE: Right knee x-rays with five views     INDICATION: contusion to rt knee, M25.561 Pain in right knee     COMPARISON: None.     FINDINGS:     No fractures or acute osseous abnormalities.     Anterior enthesophytes along the superior and inferior aspect of  the patella.     Lateral joint space narrowing and lateral osteophytes along the  lateral tibial margin.     IMPRESSION:  No acute osseous abnormalities.     Mild-to-moderate degenerative change lateral joint compartment  with joint space narrowing and osteophytes.     [id number]     Electronically signed by:  Johnny Harry MD  6/18/2021 1:18  PM CDT Workstation: [id number]      ASSESSMENT:    Diagnoses and all orders for this visit:    Chronic pain of both knees    Bilateral primary osteoarthritis of knee    PLAN    Patient is doing well overall.  She continues to experience chronic bilateral knee pain but states the pain is overall improved.  She currently rates her pain 1/10.  She has primarily complained of chronic burning pain in the anterior aspects of both knees, which has been an ongoing issue for several years but has progressively worsened over time.  I had previously ordered MRIs of the bilateral knees to evaluate for possible patellar tendinitis, degenerative tears of patellar tendons, meniscal tearing and any evidence of internal derangement.  However, while the MRIs were approved by her insurance and scheduled, the patient states it was going to cost her over $1000 out-of-pocket due to her deductible not being met on this year so she canceled the MRIs.  We discussed  that we can proceed with MRI imaging at any point and she can notify me if she has met her deductible at some point and of course if she continues to have knee pain that warrants further evaluation with advanced imaging.  Overall, she has had some improvement with some time off from work, intra-articular injections of steroid and starting a prescription oral NSAIDs since last office visit.  Patient is tolerating the meloxicam well and states it does seem to help with her knee pain.  She inquires today about repeat injections and we discussed that it has been approximately 8 weeks since her initial injections and that we need to wait 4 to 6 weeks for any repeat injections.  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  The patient indicates today that she wants to continue with conservative management of her chronic bilateral knee pain at this time.  A return to work note is provided without restrictions.  We discussed trial of some knee support braces while she works and the patient states that she has some at home.    Recommend the following:     -Rest and activity modification during times of increased pain.  -Modified weightbearing of the affected extremity with use of a cane or walker as needed during times of increased pain.  -Gradual progression of weightbearing and activity as pain and swelling allow.  -Elevation and ice therapy to the bilateral knees as needed to minimize pain/swelling/inflammation.  -Continue with home exercises for conditioning and strengthening of the bilateral knees/legs that she previously learned in her course of physical therapy.  -Continue with Meloxicam daily as needed for control of joint pain/inflammation.  Patient can also take Tylenol as needed for additional pain control.    Follow-up in 4 to 6 weeks  for recheck as needed for any new, worsening or persistent symptoms.  We discussed repeat intra-articular injections of steroid at that time if the patient continues to have knee pain and desires to proceed with repeat injections.    Time spent of a minimum of 20 minutes including the face to face evaluation, reviewing of medical history and prior medial records, reviewing of diagnostic studies, documentation, patient education and coordination of care.     EMR Dragon/Transciption Disclaimer: Some of this note may be an electronic transcription/translation of spoken language to printed text using the Dragon Dictation System.     Return in about 4 weeks (around 5/9/2022), or if symptoms worsen or fail to improve, for Recheck.        This document has been electronically signed by YONY Burns on April 12, 2022 09:42 CDT      YONY Burns

## 2022-05-02 ENCOUNTER — OFFICE VISIT (OUTPATIENT)
Dept: FAMILY MEDICINE CLINIC | Facility: CLINIC | Age: 57
End: 2022-05-02

## 2022-05-02 VITALS
DIASTOLIC BLOOD PRESSURE: 82 MMHG | WEIGHT: 184 LBS | HEIGHT: 63 IN | BODY MASS INDEX: 32.6 KG/M2 | SYSTOLIC BLOOD PRESSURE: 136 MMHG

## 2022-05-02 DIAGNOSIS — E66.09 CLASS 1 OBESITY DUE TO EXCESS CALORIES WITH SERIOUS COMORBIDITY AND BODY MASS INDEX (BMI) OF 32.0 TO 32.9 IN ADULT: Chronic | ICD-10-CM

## 2022-05-02 DIAGNOSIS — I10 ESSENTIAL HYPERTENSION: Primary | Chronic | ICD-10-CM

## 2022-05-02 DIAGNOSIS — R26.2 DIFFICULTY WALKING: ICD-10-CM

## 2022-05-02 DIAGNOSIS — I45.6 WOLFF-PARKINSON-WHITE (WPW) PATTERN: Chronic | ICD-10-CM

## 2022-05-02 DIAGNOSIS — M17.0 BILATERAL PRIMARY OSTEOARTHRITIS OF KNEE: Chronic | ICD-10-CM

## 2022-05-02 PROBLEM — M25.562 PAIN IN BOTH KNEES: Status: RESOLVED | Noted: 2022-02-17 | Resolved: 2022-05-02

## 2022-05-02 PROBLEM — M23.91 INTERNAL DERANGEMENT OF RIGHT KNEE: Chronic | Status: ACTIVE | Noted: 2022-03-20

## 2022-05-02 PROBLEM — M25.561 PAIN IN BOTH KNEES: Status: RESOLVED | Noted: 2022-02-17 | Resolved: 2022-05-02

## 2022-05-02 PROBLEM — M23.92 INTERNAL DERANGEMENT OF LEFT KNEE: Chronic | Status: ACTIVE | Noted: 2022-03-20

## 2022-05-02 PROCEDURE — 99214 OFFICE O/P EST MOD 30 MIN: CPT | Performed by: FAMILY MEDICINE

## 2022-05-02 NOTE — PROGRESS NOTES
Subjective   Pati Sotelo is a 56 y.o. female.  Reevaluation obesity hypertension history of hyperlipidemia history of colon cancer history of bilateral knee internal derangement.  In interim must son states that she is having difficulty walking and she request referral for possible MS evaluation by neurology.  Have counseled her difficulty walking is probably related to her knees which certainly would make the referral.  Last lab work revealed LDL cholesterol holding steady about 126.  Blood pressure noted.    History of Present Illness   HPI    The following portions of the patient's history were reviewed and updated as appropriate: allergies, current medications, past family history, past medical history, past social history, past surgical history and problem list.    Review of Systems  Review of Systems   Constitutional: Negative for activity change, appetite change, fatigue and unexpected weight change.   HENT: Negative for trouble swallowing and voice change.    Eyes: Negative for redness and visual disturbance.   Respiratory: Negative for cough and wheezing.    Cardiovascular: Negative for chest pain and palpitations.   Gastrointestinal: Negative for abdominal pain, constipation, diarrhea, nausea and vomiting.   Genitourinary: Negative for urgency.   Musculoskeletal: Positive for arthralgias and gait problem. Negative for joint swelling.   Neurological: Negative for syncope and headaches.   Hematological: Negative for adenopathy.   Psychiatric/Behavioral: Negative for sleep disturbance.       Objective   Physical Exam  Physical Exam  Constitutional:       Appearance: She is well-developed.   HENT:      Head: Normocephalic.   Eyes:      Pupils: Pupils are equal, round, and reactive to light.   Neck:      Thyroid: No thyromegaly.   Cardiovascular:      Rate and Rhythm: Normal rate and regular rhythm.      Heart sounds: Normal heart sounds. No murmur heard.    No friction rub. No gallop.   Pulmonary:       "Breath sounds: Normal breath sounds.   Abdominal:      General: There is no distension.      Palpations: Abdomen is soft. There is no mass.      Tenderness: There is no abdominal tenderness.   Musculoskeletal:         General: Normal range of motion.      Cervical back: Normal range of motion.      Comments: Get up and go 3 to 5 seconds.  Slightly unsteady antalgic gait to start then normal gait.  No abnormal gait swing.   Skin:     General: Skin is warm and dry.   Neurological:      Mental Status: She is alert and oriented to person, place, and time.      Deep Tendon Reflexes: Reflexes are normal and symmetric.      Reflex Scores:       Patellar reflexes are 2+ on the right side and 2+ on the left side.  Psychiatric:         Attention and Perception: Attention normal.         Mood and Affect: Mood normal.         Speech: Speech normal.         Behavior: Behavior normal.         Thought Content: Thought content normal.         Cognition and Memory: Cognition normal.           Visit Vitals  /80   Ht 160 cm (63\")   Wt 83.5 kg (184 lb)   BMI 32.59 kg/m²     Body mass index is 32.59 kg/m².  /82   Ht 160 cm (63\")   Wt 83.5 kg (184 lb)   BMI 32.59 kg/m²       Assessment/Plan   Diagnoses and all orders for this visit:    1. Essential hypertension (Primary)    2. Peter-Parkinson-White (WPW) pattern    3. Bilateral primary osteoarthritis of knee    4. Class 1 obesity due to excess calories with serious comorbidity and body mass index (BMI) of 32.0 to 32.9 in adult    5. Difficulty walking  -     Ambulatory Referral to Neurology    Counseled mainly on lifestyle measures referral made.   on wt loss measures and programs  Counseled him on lifestyle measures increasing hydration etc.  Recheck 6 months on blood pressure weight.  "

## 2022-06-13 ENCOUNTER — TRANSCRIBE ORDERS (OUTPATIENT)
Dept: GENERAL RADIOLOGY | Facility: CLINIC | Age: 57
End: 2022-06-13

## 2022-06-13 DIAGNOSIS — M25.572 ARTHRALGIA OF LEFT ANKLE OR FOOT: Primary | ICD-10-CM

## 2022-06-13 DIAGNOSIS — I10 ESSENTIAL HYPERTENSION: Chronic | ICD-10-CM

## 2022-06-13 RX ORDER — POTASSIUM CHLORIDE 20 MEQ/1
20 TABLET, EXTENDED RELEASE ORAL 2 TIMES DAILY
Qty: 180 TABLET | Refills: 3 | Status: SHIPPED | OUTPATIENT
Start: 2022-06-13

## 2022-06-23 ENCOUNTER — TRANSCRIBE ORDERS (OUTPATIENT)
Dept: LAB | Facility: HOSPITAL | Age: 57
End: 2022-06-23

## 2022-06-23 ENCOUNTER — LAB (OUTPATIENT)
Dept: LAB | Facility: HOSPITAL | Age: 57
End: 2022-06-23

## 2022-06-23 ENCOUNTER — OFFICE VISIT (OUTPATIENT)
Dept: ORTHOPEDIC SURGERY | Facility: CLINIC | Age: 57
End: 2022-06-23

## 2022-06-23 VITALS — HEIGHT: 63 IN | WEIGHT: 184 LBS | BODY MASS INDEX: 32.6 KG/M2

## 2022-06-23 DIAGNOSIS — R94.4 ABNORMAL RESULTS OF KIDNEY FUNCTION STUDIES: Primary | ICD-10-CM

## 2022-06-23 DIAGNOSIS — R94.4 ABNORMAL RESULTS OF KIDNEY FUNCTION STUDIES: ICD-10-CM

## 2022-06-23 DIAGNOSIS — M17.0 BILATERAL PRIMARY OSTEOARTHRITIS OF KNEE: ICD-10-CM

## 2022-06-23 DIAGNOSIS — G89.29 CHRONIC PAIN OF BOTH KNEES: Primary | ICD-10-CM

## 2022-06-23 DIAGNOSIS — M25.562 CHRONIC PAIN OF BOTH KNEES: Primary | ICD-10-CM

## 2022-06-23 DIAGNOSIS — M25.561 CHRONIC PAIN OF BOTH KNEES: Primary | ICD-10-CM

## 2022-06-23 LAB
ALBUMIN SERPL-MCNC: 4.4 G/DL (ref 3.5–5.2)
ANION GAP SERPL CALCULATED.3IONS-SCNC: 12.4 MMOL/L (ref 5–15)
BUN SERPL-MCNC: 11 MG/DL (ref 6–20)
BUN/CREAT SERPL: 11.7 (ref 7–25)
CALCIUM SPEC-SCNC: 9.8 MG/DL (ref 8.6–10.5)
CHLORIDE SERPL-SCNC: 106 MMOL/L (ref 98–107)
CO2 SERPL-SCNC: 23.6 MMOL/L (ref 22–29)
CREAT SERPL-MCNC: 0.94 MG/DL (ref 0.57–1)
CREAT UR-MCNC: 23 MG/DL
EGFRCR SERPLBLD CKD-EPI 2021: 70.9 ML/MIN/1.73
GLUCOSE SERPL-MCNC: 83 MG/DL (ref 65–99)
PHOSPHATE SERPL-MCNC: 3.1 MG/DL (ref 2.5–4.5)
POTASSIUM SERPL-SCNC: 4.2 MMOL/L (ref 3.5–5.2)
PROT ?TM UR-MCNC: <4 MG/DL
PROT/CREAT UR: NORMAL MG/G{CREAT}
SODIUM SERPL-SCNC: 142 MMOL/L (ref 136–145)

## 2022-06-23 PROCEDURE — 80069 RENAL FUNCTION PANEL: CPT

## 2022-06-23 PROCEDURE — 99213 OFFICE O/P EST LOW 20 MIN: CPT | Performed by: NURSE PRACTITIONER

## 2022-06-23 PROCEDURE — 84156 ASSAY OF PROTEIN URINE: CPT

## 2022-06-23 PROCEDURE — 82570 ASSAY OF URINE CREATININE: CPT

## 2022-06-23 PROCEDURE — 36415 COLL VENOUS BLD VENIPUNCTURE: CPT

## 2022-06-23 NOTE — PROGRESS NOTES
Pati Sotelo is a 56 y.o. female returns for     Chief Complaint   Patient presents with   • Left Knee - Follow-up, Pain   • Right Knee - Follow-up, Pain     HISTORY OF PRESENT ILLNESS: Patient presents to office for follow-up of chronic bilateral knee pain.  Initial onset of pain occurred several years ago with no initial injury or incident.  She primarily experiences chronic pain in the anterior aspects of both knees.  The patient works as a  at an elementary school and experiences increased pain with her work duties and weightbearing activity.  The patient previously established care with orthopedics on 2/17/2022.  She has been treated conservatively with previous intra-articular injections of steroid, a concentrated period of time off from work, prescription oral NSAIDs, physical therapy, ice therapy and rest/activity modification.  Patient previously attended 5 visits of physical therapy but had to discontinue PT at that time due to the out-of-pocket cost of $50 co-pays with each visit.  I had also previously ordered MRIs of both knees for further evaluation of the soft tissue structures and to evaluate for internal derangement, which were approved and scheduled but the patient also had to cancel the MRIs as it was going to cost her thousand dollars out-of-pocket due to her deductible not being met for this year.  Patient has continued to take meloxicam and is tolerating this medication well with no known adverse effects and states it does offer some improvement in her chronic knee pain.  No new complaints or concerns noted since last office visit.  No falls or injuries reported since last office visit.  Pain scale currently is 0/10.     CONCURRENT MEDICAL HISTORY:    The following portions of the patient's history were reviewed and updated as appropriate: allergies, current medications, past family history, past medical history, past social history, past surgical history and problem list.  "    ROS  No fevers or chills.  No chest pain or shortness of air.  No GI or  disturbances. Bilateral knee pain.     PHYSICAL EXAMINATION:       Ht 160 cm (63\")   Wt 83.5 kg (184 lb)   BMI 32.59 kg/m²     Physical Exam  Vitals reviewed.   Constitutional:       General: She is not in acute distress.     Appearance: She is well-developed. She is not ill-appearing.   HENT:      Head: Normocephalic.   Pulmonary:      Effort: Pulmonary effort is normal. No respiratory distress.   Abdominal:      General: There is no distension.      Palpations: Abdomen is soft.   Musculoskeletal:         General: Tenderness (Mild, bilateral knees) present. No swelling, deformity or signs of injury.      Right knee: No effusion.      Instability Tests: Medial Amelia test negative and lateral Amelia test negative.      Left knee: No effusion.      Instability Tests: Medial Amelia test negative and lateral Amelia test negative.   Skin:     General: Skin is warm and dry.      Capillary Refill: Capillary refill takes less than 2 seconds.      Findings: No erythema.   Neurological:      Mental Status: She is alert and oriented to person, place, and time.      GCS: GCS eye subscore is 4. GCS verbal subscore is 5. GCS motor subscore is 6.   Psychiatric:         Speech: Speech normal.         Behavior: Behavior normal.         Thought Content: Thought content normal.         Judgment: Judgment normal.         GAIT:     []  Normal  [x]  Antalgic    Assistive device: [x]  None  []  Walker     []  Crutches  []  Cane     []  Wheelchair  []  Stretcher    Right Knee Exam     Tenderness   Right knee tenderness location: Mild, diffuse (anterior)    Range of Motion   Right knee extension: 2.   Flexion: 120     Tests   Amelia:  Medial - negative Lateral - negative  Varus: negative Valgus: negative    Other   Erythema: absent  Sensation: normal  Pulse: present  Swelling: none  Effusion: no effusion present    Comments:  Bony prominence noted at " the tibial tubercle.      Left Knee Exam     Tenderness   Left knee tenderness location: Mild, diffuse (anterior)    Range of Motion   Extension: 0   Flexion: 120     Tests   Amelia:  Medial - negative Lateral - negative  Varus: negative Valgus: negative    Other   Erythema: absent  Sensation: normal  Pulse: present  Swelling: none  Effusion: no effusion present    Comments:  Bony prominence noted at the tibial tubercle.        XR Knee 1 or 2 View Left     Result Date: 2/17/2022  Narrative: EXAM: Bilateral knee series (3 views) CLINICAL INDICATION: Bilateral knee pain COMPARISON: Right knee series 6/18/2021 FINDINGS: There is no acute fracture, dislocation or destructive lesion. There is mild prominence of the bilateral tibial spines and prominent marginal osteophytosis of the lateral compartment on the left. There is no osseous loose body. There is small left suprapatellar effusion. There is no significant soft tissue swelling or obvious subcutaneous gas.      Impression: CONCLUSION: Mild bilateral osteoarthritic changes and small left suprapatellar effusion. Electronically signed by:  Umberto Warner DO  2/17/2022 11:08 AM CST Workstation: [id number]     XR Knee Bilateral AP Standing     Result Date: 2/17/2022  Narrative: EXAM: Bilateral knee series (3 views) CLINICAL INDICATION: Bilateral knee pain COMPARISON: Right knee series 6/18/2021 FINDINGS: There is no acute fracture, dislocation or destructive lesion. There is mild prominence of the bilateral tibial spines and prominent marginal osteophytosis of the lateral compartment on the left. There is no osseous loose body. There is small left suprapatellar effusion. There is no significant soft tissue swelling or obvious subcutaneous gas.      Impression: CONCLUSION: Mild bilateral osteoarthritic changes and small left suprapatellar effusion. Electronically signed by:  Umberto Warner DO  2/17/2022 11:08 AM CST Workstation: [id number]     PROCEDURE: Right knee x-rays  with five views     INDICATION: contusion to rt knee, M25.561 Pain in right knee     COMPARISON: None.     FINDINGS:     No fractures or acute osseous abnormalities.     Anterior enthesophytes along the superior and inferior aspect of  the patella.     Lateral joint space narrowing and lateral osteophytes along the  lateral tibial margin.     IMPRESSION:  No acute osseous abnormalities.     Mild-to-moderate degenerative change lateral joint compartment  with joint space narrowing and osteophytes.     [id number]     Electronically signed by:  Johnny Harry MD  6/18/2021 1:18  PM CDT Workstation: [id number]      ASSESSMENT:    Diagnoses and all orders for this visit:    Chronic pain of both knees  -     Miscellaneous DME    Bilateral primary osteoarthritis of knee  -     Miscellaneous DME    PLAN    Patient is doing well overall in regards to her chronic bilateral knee pain.  She has experienced ongoing and progressively worsening bilateral knee pain over the course of several years with no prior injury.  Patient has some osteoarthritic changes noted in both knee joints, which could be a source of her pain.  I had also previously ordered MRI imaging of both knees for further evaluation of the soft tissue structures, which were approved and scheduled for the patient but she had to cancel the MRIs as it was going to cost her thousand dollars out-of-pocket due to her deductible not being met for this year.  We had previously discussed that we can reorder the MRIs at another time if needed.  The patient has been treated conservatively as described in the HPI above with some gradual improvement in her bilateral knee pain.  We had discussed at last office visit proceeding today with repeat injections if needed but today she states that her knee pain is well controlled and she declines any further injections at this time.  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  She hopes to be able to retire in the next 2 years.  We again discussed that I recommend she utilize a cane for modified weightbearing during times of increased pain, which will improve her pain and take stress off of her knee joints. An order for cane is faxed to the medical supply company today for the patient.    Recommend the following:     -Rest and activity modification during times of increased pain.  -Modified weightbearing of the affected extremity with use of a cane or walker as needed during times of increased pain.  -Gradual progression of weightbearing and activity as pain and swelling allow.  -Elevation and ice therapy to the bilateral knees as needed to minimize pain/swelling/inflammation.  -Continue with home exercises for conditioning and strengthening of the bilateral knees/legs that she previously learned in her course of physical therapy.  -Continue with Meloxicam daily as needed for control of joint pain/inflammation.  Patient can also take Tylenol as needed for additional pain control.    Follow-up as needed for any new, worsening or persistent symptoms.  We discussed that she can follow-up at any time if her pain increases and if she would like to proceed with repeat intra-articular injections of steroid for further management of her osteoarthritic pain.  Consider referral to physical therapy for conditioning/strengthening exercises.  Viscosupplementation would be an additional treatment option for her osteoarthritic pain, but she has [facility] and currently they do not cover these injections.    Time spent of a minimum of 20 minutes including the face to face evaluation, reviewing of medical history and prior medial records, reviewing of diagnostic studies, ordering DME, documentation, patient education and coordination of care.     EMR Dragon/NodePrimeiption Disclaimer: Some of this note may be an electronic  transcription/translation of spoken language to printed text using the Dragon Dictation System.     Return if symptoms worsen or fail to improve.        This document has been electronically signed by YONY Burns on June 26, 2022 14:36 CDT      YONY Burns

## 2022-06-26 PROBLEM — M25.561 CHRONIC PAIN OF BOTH KNEES: Status: ACTIVE | Noted: 2022-06-26

## 2022-06-26 PROBLEM — G89.29 CHRONIC PAIN OF BOTH KNEES: Status: ACTIVE | Noted: 2022-06-26

## 2022-06-26 PROBLEM — M25.562 CHRONIC PAIN OF BOTH KNEES: Status: ACTIVE | Noted: 2022-06-26

## 2022-07-07 ENCOUNTER — OFFICE VISIT (OUTPATIENT)
Dept: GASTROENTEROLOGY | Facility: CLINIC | Age: 57
End: 2022-07-07

## 2022-07-07 VITALS
DIASTOLIC BLOOD PRESSURE: 99 MMHG | HEART RATE: 95 BPM | WEIGHT: 185.4 LBS | HEIGHT: 63 IN | BODY MASS INDEX: 32.85 KG/M2 | SYSTOLIC BLOOD PRESSURE: 157 MMHG

## 2022-07-07 DIAGNOSIS — Z85.038 PERSONAL HISTORY OF COLON CANCER: Primary | ICD-10-CM

## 2022-07-07 PROCEDURE — 99214 OFFICE O/P EST MOD 30 MIN: CPT | Performed by: INTERNAL MEDICINE

## 2022-07-07 RX ORDER — SODIUM, POTASSIUM,MAG SULFATES 17.5-3.13G
1 SOLUTION, RECONSTITUTED, ORAL ORAL EVERY 12 HOURS
Qty: 1 ML | Refills: 0 | Status: SHIPPED | OUTPATIENT
Start: 2022-07-07 | End: 2022-08-16

## 2022-07-07 RX ORDER — DEXTROSE AND SODIUM CHLORIDE 5; .45 G/100ML; G/100ML
30 INJECTION, SOLUTION INTRAVENOUS CONTINUOUS PRN
Status: CANCELLED | OUTPATIENT
Start: 2022-08-17

## 2022-07-07 NOTE — PROGRESS NOTES
[facility]      Chief Complaint:   Chief Complaint   Patient presents with   • 1 Year Clinical Appointment     Personal History Of Colonic Polyps    Personal History Of Malignant Neoplasm Of Rectum, Rectosigmoid Junction, And Anus       Subjective     HPI:   *Patient with history of malignant tumor in the colon with resection.  Patient had a repeat colonoscopy last year which showed a tubular adenoma at the anastomosis.    Plan; we will schedule patient for repeat colonoscopy    Past Medical History:   Past Medical History:   Diagnosis Date   • Abdominal pain     lower, hypo gastrium, s/p surgery      • Anomalous atrioventricular excitation    • C. difficile colitis     Hx   • Colon cancer (HCC)    • Conduction disorder of the heart      Supraventricular      • Depression    • Drug therapy    • Encounter for screening for malignant neoplasm of colon    • Essential hypertension    • Family history of malignant neoplasm of digestive organ    • History of colon polyps    • History of malignant neoplasm of colon     s/p resection in 2009 and chemo   • History of malignant neoplasm of gastrointestinal tract    • Hyperaldosteronism (HCC)    • Hyperplastic polyp of large intestine    • Hypertensive disorder    • Hypokalemia    • Intramural leiomyoma of uterus    • 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     Folate and B12 ordered      • Menstruation disorder     abnormal bleeding from female genital tract      • MRSA infection     Hx   • Screening for malignant neoplasm of breast    • Vitamin D deficiency    • Peter-Parkinson-White (WPW) pattern        Past Surgical History:  Past Surgical History:   Procedure Laterality Date   • CARDIAC ABLATION     • CARDIAC CATHETERIZATION  07/02/2007    No significant coronary artery disease with normal left ventricular function   • COLECTOMY PARTIAL / TOTAL  12/23/2009    Mid sigmoid obstructing colon cancer   • COLONOSCOPY N/A 6/29/2021    Procedure: COLONOSCOPY;   Surgeon: Jorge A Sheehan MD;  Location: [facility] ENDOSCOPY;  Service: Gastroenterology;  Laterality: N/A;   • ECG CONVERTED  09/29/2008    Sinus rhythm with short TX. Minimal voltage criteria for LVH, may be normal variant T wave abnormality, consider inferolateral ischemia. Prolonged QT Abnormal ECG   • ENDOSCOPY AND COLONOSCOPY  06/27/2016    External and internal hemorrhoids. The examination was otherwise normal. No specimens collected   • HERNIA REPAIR     • TUBAL ABDOMINAL LIGATION     • UPPER GASTROINTESTINAL ENDOSCOPY  12/23/2009       Family History:  Family History   Problem Relation Age of Onset   • Depression Other    • Hypertension Other    • Cancer Father         colon   • Colon cancer Father    • Cancer Paternal Grandfather        Social History:   reports that she has never smoked. She has never used smokeless tobacco. She reports that she does not drink alcohol and does not use drugs.    Medications:   Prior to Admission medications    Medication Sig Start Date End Date Taking? Authorizing Provider   amitriptyline (ELAVIL) 50 MG tablet Take 1 tablet by mouth Every Night. 3/11/22  Yes George Ayala MD   amLODIPine (NORVASC) 10 MG tablet Take 1 tablet by mouth Daily. 4/11/22  Yes George Ayala MD   lisinopril (PRINIVIL,ZESTRIL) 40 MG tablet Take 1 tablet by mouth Daily. 7/19/21  Yes George Ayala MD   metoprolol tartrate (LOPRESSOR) 50 MG tablet Take 1 tablet by mouth 2 (Two) Times a Day. 3/11/22  Yes George Ayala MD   potassium chloride (KLOR-CON) 20 MEQ CR tablet Take 1 tablet by mouth 2 (Two) Times a Day. 6/13/22  Yes George Ayala MD   spironolactone (ALDACTONE) 25 MG tablet Take 1 tablet by mouth Daily. 7/19/21  Yes George Ayala MD   Vitamin D, Cholecalciferol, (CHOLECALCIFEROL) 10 MCG (400 UNIT) tablet Take 400 Units by mouth Daily.   Yes Provider, MD Seth   sodium-potassium-magnesium sulfates (SUPREP) 17.5-3.13-1.6 GM/177ML solution oral solution Take 1 bottle by mouth Every 12  "(Twelve) Hours. 7/7/22   Jorge A Sheehan MD       Allergies:  Tums [calcium carbonate antacid] and Sulfa antibiotics    ROS:    Review of Systems   Constitutional: Negative for activity change, appetite change, chills, diaphoresis, fatigue, fever and unexpected weight change.   HENT: Negative for sore throat and trouble swallowing.    Respiratory: Negative for shortness of breath.    Gastrointestinal: Negative for abdominal distention, abdominal pain, anal bleeding, blood in stool, constipation, diarrhea, nausea, rectal pain and vomiting.   Endocrine: Negative for polydipsia, polyphagia and polyuria.   Genitourinary: Negative for difficulty urinating.   Musculoskeletal: Negative for arthralgias.   Skin: Negative for pallor.   Allergic/Immunologic: Negative for food allergies.   Neurological: Negative for weakness and light-headedness.   Psychiatric/Behavioral: Negative for behavioral problems.     Objective     Blood pressure 157/99, pulse 95, height 160 cm (63\"), weight 84.1 kg (185 lb 6.4 oz), not currently breastfeeding.    Physical Exam  Constitutional:       General: She is not in acute distress.     Appearance: She is well-developed. She is not diaphoretic.   HENT:      Head: Normocephalic and atraumatic.   Cardiovascular:      Rate and Rhythm: Normal rate and regular rhythm.      Heart sounds: Normal heart sounds. No murmur heard.    No friction rub. No gallop.   Pulmonary:      Effort: No respiratory distress.      Breath sounds: Normal breath sounds. No wheezing or rales.   Chest:      Chest wall: No tenderness.   Abdominal:      General: Bowel sounds are normal. There is no distension.      Palpations: Abdomen is soft. There is no mass.      Tenderness: There is no abdominal tenderness. There is no guarding or rebound.      Hernia: No hernia is present.   Musculoskeletal:         General: Normal range of motion.   Skin:     General: Skin is warm and dry.      Coloration: Skin is not pale.      Findings: " No erythema or rash.   Neurological:      Mental Status: She is alert and oriented to person, place, and time.   Psychiatric:         Behavior: Behavior normal.         Thought Content: Thought content normal.         Judgment: Judgment normal.          Assessment & Plan   Diagnoses and all orders for this visit:    1. Personal history of colon cancer (Primary)  -     Case Request; Standing  -     Case Request    Other orders  -     Follow Anesthesia Guidelines / Standing Orders; Future  -     Obtain Informed Consent; Future  -     sodium-potassium-magnesium sulfates (SUPREP) 17.5-3.13-1.6 GM/177ML solution oral solution; Take 1 bottle by mouth Every 12 (Twelve) Hours.  Dispense: 1 mL; Refill: 0        COLONOSCOPY (N/A)     Diagnosis Plan   1. Personal history of colon cancer  Case Request    Case Request       Anticipated Surgical Procedure:  Orders Placed This Encounter   Procedures   • Follow Anesthesia Guidelines / Standing Orders     Standing Status:   Future   • Obtain Informed Consent     Standing Status:   Future     Order Specific Question:   Informed Consent Given For     Answer:   colonoscopy       The risks, benefits, and alternatives of this procedure have been discussed with the patient or the responsible party- the patient understands and agrees to proceed.

## 2022-07-11 DIAGNOSIS — I10 ESSENTIAL HYPERTENSION: Chronic | ICD-10-CM

## 2022-07-11 RX ORDER — SPIRONOLACTONE 25 MG/1
25 TABLET ORAL DAILY
Qty: 90 TABLET | Refills: 3 | Status: SHIPPED | OUTPATIENT
Start: 2022-07-11

## 2022-07-11 RX ORDER — LISINOPRIL 40 MG/1
40 TABLET ORAL DAILY
Qty: 90 TABLET | Refills: 3 | Status: SHIPPED | OUTPATIENT
Start: 2022-07-11

## 2022-07-12 ENCOUNTER — TRANSCRIBE ORDERS (OUTPATIENT)
Dept: LAB | Facility: HOSPITAL | Age: 57
End: 2022-07-12

## 2022-07-12 DIAGNOSIS — R94.4 ABNORMAL RENAL FUNCTION TEST: Primary | ICD-10-CM

## 2022-08-17 ENCOUNTER — ANESTHESIA EVENT (OUTPATIENT)
Dept: GASTROENTEROLOGY | Facility: HOSPITAL | Age: 57
End: 2022-08-17

## 2022-08-17 ENCOUNTER — ANESTHESIA (OUTPATIENT)
Dept: GASTROENTEROLOGY | Facility: HOSPITAL | Age: 57
End: 2022-08-17

## 2022-08-17 ENCOUNTER — HOSPITAL ENCOUNTER (OUTPATIENT)
Facility: HOSPITAL | Age: 57
Setting detail: HOSPITAL OUTPATIENT SURGERY
Discharge: HOME | End: 2022-08-17
Attending: INTERNAL MEDICINE | Admitting: INTERNAL MEDICINE

## 2022-08-17 VITALS
WEIGHT: 178.57 LBS | DIASTOLIC BLOOD PRESSURE: 79 MMHG | HEIGHT: 63 IN | BODY MASS INDEX: 31.64 KG/M2 | HEART RATE: 61 BPM | SYSTOLIC BLOOD PRESSURE: 124 MMHG | TEMPERATURE: 98.3 F | OXYGEN SATURATION: 96 % | RESPIRATION RATE: 18 BRPM

## 2022-08-17 DIAGNOSIS — Z85.038 PERSONAL HISTORY OF COLON CANCER: ICD-10-CM

## 2022-08-17 PROCEDURE — 45378 DIAGNOSTIC COLONOSCOPY: CPT | Performed by: INTERNAL MEDICINE

## 2022-08-17 PROCEDURE — 25010000002 PROPOFOL 10 MG/ML EMULSION: Performed by: NURSE ANESTHETIST, CERTIFIED REGISTERED

## 2022-08-17 RX ORDER — DEXTROSE AND SODIUM CHLORIDE 5; .45 G/100ML; G/100ML
30 INJECTION, SOLUTION INTRAVENOUS CONTINUOUS PRN
Status: DISCONTINUED | OUTPATIENT
Start: 2022-08-17 | End: 2022-08-17 | Stop reason: HOSPADM

## 2022-08-17 RX ORDER — PROPOFOL 10 MG/ML
VIAL (ML) INTRAVENOUS AS NEEDED
Status: DISCONTINUED | OUTPATIENT
Start: 2022-08-17 | End: 2022-08-17 | Stop reason: SURG

## 2022-08-17 RX ADMIN — PROPOFOL 70 MG: 10 INJECTION, EMULSION INTRAVENOUS at 13:20

## 2022-08-17 RX ADMIN — DEXTROSE AND SODIUM CHLORIDE 30 ML/HR: 5; 450 INJECTION, SOLUTION INTRAVENOUS at 12:45

## 2022-08-17 RX ADMIN — PROPOFOL 20 MG: 10 INJECTION, EMULSION INTRAVENOUS at 13:23

## 2022-08-17 NOTE — ANESTHESIA POSTPROCEDURE EVALUATION
Patient: Pati Sotelo    Procedure Summary     Date: 08/17/22 [facility]    Anesthesia Start: 1320 Anesthesia Stop: 1329    Procedure: COLONOSCOPY (N/A ) Diagnosis:       Personal history of colon cancer      (Personal history of colon cancer [Z85.038])    Surgeons: Jorge A Sheehan MD Provider: Denzel Coburn CRNA    Anesthesia Type: MAC ASA Status: 3          Anesthesia Type: MAC    Vitals  No vitals data found for the desired time range.          Post Anesthesia Care and Evaluation    Patient location during evaluation: bedside  Patient participation: waiting for patient participation  Level of consciousness: responsive to verbal stimuli  Pain management: adequate    Airway patency: patent  Anesthetic complications: No anesthetic complications  PONV Status: none  Cardiovascular status: acceptable  Respiratory status: acceptable  Hydration status: acceptable    Comments: ---------------------------               08/17/22                      1235         ---------------------------   BP:          151/93         Pulse:         62           Resp:          20           Temp:   98.2 °F (36.8 °C)   SpO2:          98%         ---------------------------

## 2022-08-17 NOTE — H&P
[facility]      Chief Complaint:   No chief complaint on file.      Subjective     HPI:   *Patient with history of malignant tumor in the colon with resection.  Patient had a repeat colonoscopy last year which showed a tubular adenoma at the anastomosis.    Plan; we will schedule patient for repeat colonoscopy    Past Medical History:   Past Medical History:   Diagnosis Date   • Abdominal pain     lower, hypo gastrium, s/p surgery      • Anomalous atrioventricular excitation    • Arthritis    • C. difficile colitis     Hx   • Colon cancer (HCC)    • Conduction disorder of the heart      Supraventricular      • Depression    • Drug therapy    • Encounter for screening for malignant neoplasm of colon    • Essential hypertension    • Family history of malignant neoplasm of digestive organ    • History of colon polyps    • History of malignant neoplasm of colon     s/p resection in 2009 and chemo   • History of malignant neoplasm of gastrointestinal tract    • Hyperaldosteronism (HCC)    • Hyperplastic polyp of large intestine    • Hypokalemia    • Intramural leiomyoma of uterus    • 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     Folate and B12 ordered      • Menstruation disorder     abnormal bleeding from female genital tract      • MRSA infection     Hx   • Screening for malignant neoplasm of breast    • Vitamin D deficiency    • Peter-Parkinson-White (WPW) pattern        Past Surgical History:    Past Surgical History:   Procedure Laterality Date   • CARDIAC ABLATION     • CARDIAC CATHETERIZATION  07/02/2007    No significant coronary artery disease with normal left ventricular function   • COLECTOMY PARTIAL / TOTAL  12/23/2009    Mid sigmoid obstructing colon cancer   • COLONOSCOPY N/A 6/29/2021    Procedure: COLONOSCOPY;  Surgeon: Jorge A Sheehan MD;  Location: [facility] ENDOSCOPY;  Service: Gastroenterology;  Laterality: N/A;   • ECG CONVERTED  09/29/2008    Sinus rhythm with short MD. Minimal  voltage criteria for LVH, may be normal variant T wave abnormality, consider inferolateral ischemia. Prolonged QT Abnormal ECG   • ENDOSCOPY AND COLONOSCOPY  06/27/2016    External and internal hemorrhoids. The examination was otherwise normal. No specimens collected   • HERNIA REPAIR     • TUBAL ABDOMINAL LIGATION     • UPPER GASTROINTESTINAL ENDOSCOPY  12/23/2009       Family History:  Family History   Problem Relation Age of Onset   • Depression Other    • Hypertension Other    • Cancer Father         colon   • Colon cancer Father    • Cancer Paternal Grandfather        Social History:   reports that she has never smoked. She has never used smokeless tobacco. She reports that she does not drink alcohol and does not use drugs.    Medications:   Prior to Admission medications    Medication Sig Start Date End Date Taking? Authorizing Provider   amitriptyline (ELAVIL) 50 MG tablet Take 1 tablet by mouth Every Night. 3/11/22  Yes George Ayala MD   amLODIPine (NORVASC) 10 MG tablet Take 1 tablet by mouth Daily. 4/11/22  Yes George Ayala MD   lisinopril (PRINIVIL,ZESTRIL) 40 MG tablet Take 1 tablet by mouth Daily. 7/19/21  Yes George Ayala MD   metoprolol tartrate (LOPRESSOR) 50 MG tablet Take 1 tablet by mouth 2 (Two) Times a Day. 3/11/22  Yes George Ayala MD   potassium chloride (KLOR-CON) 20 MEQ CR tablet Take 1 tablet by mouth 2 (Two) Times a Day. 6/13/22  Yes George Ayala MD   spironolactone (ALDACTONE) 25 MG tablet Take 1 tablet by mouth Daily. 7/19/21  Yes George Ayala MD   Vitamin D, Cholecalciferol, (CHOLECALCIFEROL) 10 MCG (400 UNIT) tablet Take 400 Units by mouth Daily.   Yes Seth Waldron MD   sodium-potassium-magnesium sulfates (SUPREP) 17.5-3.13-1.6 GM/177ML solution oral solution Take 1 bottle by mouth Every 12 (Twelve) Hours. 7/7/22   Jorge A Sheehan MD       Allergies:  Tums [calcium carbonate antacid] and Sulfa antibiotics    ROS:    Review of Systems   Constitutional: Negative for  "activity change, appetite change, chills, diaphoresis, fatigue, fever and unexpected weight change.   HENT: Negative for sore throat and trouble swallowing.    Respiratory: Negative for shortness of breath.    Gastrointestinal: Negative for abdominal distention, abdominal pain, anal bleeding, blood in stool, constipation, diarrhea, nausea, rectal pain and vomiting.   Endocrine: Negative for polydipsia, polyphagia and polyuria.   Genitourinary: Negative for difficulty urinating.   Musculoskeletal: Negative for arthralgias.   Skin: Negative for pallor.   Allergic/Immunologic: Negative for food allergies.   Neurological: Negative for weakness and light-headedness.   Psychiatric/Behavioral: Negative for behavioral problems.     Objective    /79   Pulse 61   Temp 98.3 °F (36.8 °C)   Resp 18   Ht 160 cm (62.99\")   Wt 81 kg (178 lb 9.2 oz)   SpO2 96%   BMI 31.64 kg/m²     not currently breastfeeding.    Physical Exam  Constitutional:       General: She is not in acute distress.     Appearance: She is well-developed. She is not diaphoretic.   HENT:      Head: Normocephalic and atraumatic.   Cardiovascular:      Rate and Rhythm: Normal rate and regular rhythm.      Heart sounds: Normal heart sounds. No murmur heard.    No friction rub. No gallop.   Pulmonary:      Effort: No respiratory distress.      Breath sounds: Normal breath sounds. No wheezing or rales.   Chest:      Chest wall: No tenderness.   Abdominal:      General: Bowel sounds are normal. There is no distension.      Palpations: Abdomen is soft. There is no mass.      Tenderness: There is no abdominal tenderness. There is no guarding or rebound.      Hernia: No hernia is present.   Musculoskeletal:         General: Normal range of motion.   Skin:     General: Skin is warm and dry.      Coloration: Skin is not pale.      Findings: No erythema or rash.   Neurological:      Mental Status: She is alert and oriented to person, place, and time. "   Psychiatric:         Behavior: Behavior normal.         Thought Content: Thought content normal.         Judgment: Judgment normal.          Assessment & Plan   Diagnoses and all orders for this visit:    1. Personal history of colon cancer  -     dextrose 5 % and sodium chloride 0.45 % infusion    Other orders  -     Implement Anesthesia Orders Day of Procedure; Standing  -     Obtain Informed Consent; Standing  -     POC Glucose Once; Standing  -     Insert Peripheral IV; Standing  -     Implement Anesthesia Orders Day of Procedure  -     Obtain Informed Consent  -     POC Glucose Once  -     Insert Peripheral IV        COLONOSCOPY (N/A)     Diagnosis Plan   1. Personal history of colon cancer  dextrose 5 % and sodium chloride 0.45 % infusion       Anticipated Surgical Procedure:  Orders Placed This Encounter   Procedures   • Implement Anesthesia Orders Day of Procedure     Standing Status:   Standing     Number of Occurrences:   1   • Obtain Informed Consent     Standing Status:   Standing     Number of Occurrences:   1     Order Specific Question:   Informed Consent Given For     Answer:   colonoscopy   • POC Glucose Once     Prior to Procedure on ALL Diabetic Patients     Standing Status:   Standing     Number of Occurrences:   1     Order Specific Question:   Release to patient     Answer:   Immediate   • Insert Peripheral IV     Standing Status:   Standing     Number of Occurrences:   1       The risks, benefits, and alternatives of this procedure have been discussed with the patient or the responsible party- the patient understands and agrees to proceed.

## 2022-08-17 NOTE — ANESTHESIA PREPROCEDURE EVALUATION
Anesthesia Evaluation     NPO Solid Status: > 8 hours  NPO Liquid Status: > 2 hours           Airway   Mallampati: II  TM distance: >3 FB  Neck ROM: full  no difficulty expected  Dental - normal exam     Pulmonary - normal exam   Cardiovascular - normal exam    (+) hypertension, dysrhythmias,       Neuro/Psych  (+) psychiatric history,    GI/Hepatic/Renal/Endo      Musculoskeletal     Abdominal    Substance History      OB/GYN          Other   arthritis,    history of cancer                    Anesthesia Plan    ASA 3     MAC     intravenous induction     Anesthetic plan, risks, benefits, and alternatives have been provided, discussed and informed consent has been obtained with: patient.        CODE STATUS:

## 2022-09-07 ENCOUNTER — HOSPITAL ENCOUNTER (OUTPATIENT)
Dept: MRI IMAGING | Facility: HOSPITAL | Age: 57
Discharge: HOME | End: 2022-09-07

## 2022-09-07 DIAGNOSIS — M25.561 CHRONIC PAIN OF BOTH KNEES: ICD-10-CM

## 2022-09-07 DIAGNOSIS — M25.562 CHRONIC PAIN OF BOTH KNEES: ICD-10-CM

## 2022-09-07 DIAGNOSIS — G89.29 CHRONIC PAIN OF BOTH KNEES: ICD-10-CM

## 2022-09-07 PROCEDURE — 73721 MRI JNT OF LWR EXTRE W/O DYE: CPT

## 2022-10-20 ENCOUNTER — OFFICE VISIT (OUTPATIENT)
Dept: FAMILY MEDICINE CLINIC | Facility: CLINIC | Age: 57
End: 2022-10-20

## 2022-10-20 VITALS
WEIGHT: 178 LBS | DIASTOLIC BLOOD PRESSURE: 78 MMHG | BODY MASS INDEX: 31.54 KG/M2 | SYSTOLIC BLOOD PRESSURE: 122 MMHG | HEIGHT: 63 IN

## 2022-10-20 DIAGNOSIS — M23.91 INTERNAL DERANGEMENT OF RIGHT KNEE: Chronic | ICD-10-CM

## 2022-10-20 DIAGNOSIS — M79.604 LEG PAIN, RIGHT: Primary | ICD-10-CM

## 2022-10-20 DIAGNOSIS — Z23 NEED FOR VACCINATION: ICD-10-CM

## 2022-10-20 DIAGNOSIS — M23.92 INTERNAL DERANGEMENT OF LEFT KNEE: Chronic | ICD-10-CM

## 2022-10-20 DIAGNOSIS — M79.651 ACUTE THIGH PAIN, RIGHT: ICD-10-CM

## 2022-10-20 DIAGNOSIS — R26.2 DIFFICULTY WALKING: ICD-10-CM

## 2022-10-20 PROBLEM — M25.562 CHRONIC PAIN OF BOTH KNEES: Chronic | Status: ACTIVE | Noted: 2022-06-26

## 2022-10-20 PROBLEM — G89.29 CHRONIC PAIN OF BOTH KNEES: Chronic | Status: ACTIVE | Noted: 2022-06-26

## 2022-10-20 PROBLEM — M25.561 CHRONIC PAIN OF BOTH KNEES: Chronic | Status: ACTIVE | Noted: 2022-06-26

## 2022-10-20 PROCEDURE — 90471 IMMUNIZATION ADMIN: CPT | Performed by: FAMILY MEDICINE

## 2022-10-20 PROCEDURE — 90686 IIV4 VACC NO PRSV 0.5 ML IM: CPT | Performed by: FAMILY MEDICINE

## 2022-10-20 PROCEDURE — 99214 OFFICE O/P EST MOD 30 MIN: CPT | Performed by: FAMILY MEDICINE

## 2022-10-20 NOTE — PROGRESS NOTES
Subjective   Pati Sotelo is a 57 y.o. female.  Requesting evaluation of right lower leg pain.  States occurred about 3 days ago.  Right posterior thigh posterior calf popliteal space pain with flexion extension at the knee.  No direct trauma.  Has bilateral meniscal tears in both knees per MRI.  Was seen earlier in the sprain found to have difficulty walking arrangements were made for neurological consultation however this has not been done and will not be his seen until November.  Is having a lot of core muscle issues and difficulty walking from same.  Has a Rollator at home but is not using it very well.  Is trying to continue to work.  Chart reviewed.    History of Present Illness   HPI    The following portions of the patient's history were reviewed and updated as appropriate: allergies, current medications, past family history, past medical history, past social history, past surgical history and problem list.    Review of Systems  Review of Systems   Constitutional: Negative for activity change, appetite change, fatigue and unexpected weight change.   HENT: Negative for trouble swallowing and voice change.    Eyes: Negative for redness and visual disturbance.   Respiratory: Negative for cough and wheezing.    Cardiovascular: Negative for chest pain and palpitations.   Gastrointestinal: Negative for abdominal pain, constipation, diarrhea, nausea and vomiting.   Genitourinary: Negative for urgency.   Musculoskeletal: Positive for arthralgias, gait problem, joint swelling and myalgias.   Neurological: Negative for syncope and headaches.   Hematological: Negative for adenopathy.   Psychiatric/Behavioral: Negative for sleep disturbance.       Objective   Physical Exam  Physical Exam  Constitutional:       Appearance: She is well-developed.   HENT:      Head: Normocephalic.   Eyes:      Pupils: Pupils are equal, round, and reactive to light.   Neck:      Thyroid: No thyromegaly.   Cardiovascular:      Rate and  "Rhythm: Normal rate.      Heart sounds: No murmur heard.    No friction rub. No gallop.   Pulmonary:      Breath sounds: Normal breath sounds.   Abdominal:      General: There is no distension.      Palpations: Abdomen is soft. There is no mass.      Tenderness: There is no abdominal tenderness.   Musculoskeletal:         General: Normal range of motion.      Cervical back: Normal range of motion.      Comments: Patient in wheelchair analysis has to stand pushing up relatively poor core muscle strength.  Bilateral knee crepitance and pain.  The right posterior hamstring down to the calf is tender to palpation but no cord or true Homans' sign.  Pain to flexion extension at the knee and posterior hamstring and upper buttock.  Gait is slightly antalgic and is shuffling.   Skin:     General: Skin is warm and dry.   Neurological:      Mental Status: She is alert and oriented to person, place, and time.      Deep Tendon Reflexes: Reflexes are normal and symmetric.      Reflex Scores:       Patellar reflexes are 2+ on the right side and 2+ on the left side.  Psychiatric:      Comments: No distress           Visit Vitals  /78   Ht 160 cm (63\")   Wt 80.7 kg (178 lb)   LMP 09/08/2017   BMI 31.53 kg/m²     Body mass index is 31.53 kg/m².    /78   Ht 160 cm (63\")   Wt 80.7 kg (178 lb)   LMP 09/08/2017   BMI 31.53 kg/m²     Assessment/Plan   Diagnoses and all orders for this visit:    1. Leg pain, right (Primary)  -     US Venous Doppler Lower Extremity Right (duplex); Future  -     Ambulatory Referral to Physical Therapy Evaluate and treat, Ortho, Neuro    2. Need for vaccination  -     FluLaval/Fluarix/Fluzone >6 Months    3. Acute thigh pain, right  -     US Venous Doppler Lower Extremity Right (duplex); Future  -     Ambulatory Referral to Physical Therapy Evaluate and treat, Ortho, Neuro    4. Difficulty walking  -     US Venous Doppler Lower Extremity Right (duplex); Future  -     Ambulatory Referral to " Physical Therapy Evaluate and treat, Ortho, Neuro    5. Internal derangement of right knee    6. Internal derangement of left knee    Multiple issues to address acutely rule out DVT but I suspect symptoms are more related to hamstring.  He has Doppler tomorrow go ahead and start PT orders.  Counseled he rest massage stretching conservative therapy.  Counseled using Rollator at all times.  No work till seen again.  Follow-up in 3 to 4 weeks for same depending on outcome of studies and PT.

## 2022-10-21 ENCOUNTER — HOSPITAL ENCOUNTER (OUTPATIENT)
Dept: ULTRASOUND IMAGING | Facility: HOSPITAL | Age: 57
Discharge: HOME | End: 2022-10-21
Admitting: FAMILY MEDICINE

## 2022-10-21 DIAGNOSIS — M79.604 LEG PAIN, RIGHT: ICD-10-CM

## 2022-10-21 DIAGNOSIS — R26.2 DIFFICULTY WALKING: ICD-10-CM

## 2022-10-21 DIAGNOSIS — M79.651 ACUTE THIGH PAIN, RIGHT: ICD-10-CM

## 2022-10-21 PROCEDURE — 93971 EXTREMITY STUDY: CPT

## 2022-10-25 ENCOUNTER — HOSPITAL ENCOUNTER (OUTPATIENT)
Dept: PHYSICAL THERAPY | Facility: HOSPITAL | Age: 57
Setting detail: THERAPIES SERIES
Discharge: HOME | End: 2022-10-25

## 2022-10-25 DIAGNOSIS — M79.651 ACUTE PAIN OF RIGHT THIGH: ICD-10-CM

## 2022-10-25 DIAGNOSIS — M79.604 RIGHT LEG PAIN: Primary | ICD-10-CM

## 2022-10-25 PROCEDURE — 97110 THERAPEUTIC EXERCISES: CPT | Performed by: PHYSICAL THERAPIST

## 2022-10-25 PROCEDURE — 97162 PT EVAL MOD COMPLEX 30 MIN: CPT | Performed by: PHYSICAL THERAPIST

## 2022-10-25 NOTE — THERAPY EVALUATION
Outpatient Physical Therapy Ortho Initial Evaluation  [facility]     Patient Name: Pati Sotelo  : 1965  MRN: [id number]  Today's Date: 10/25/2022      Visit Date: 10/25/2022  Visit   Return to MD:RADHA  Re-cert date: 11/15/22  Patient Active Problem List   Diagnosis   • Peter-Parkinson-White (WPW) pattern   • Essential hypertension   • Depression   • Class 1 obesity due to excess calories with serious comorbidity and body mass index (BMI) of 31.0 to 31.9 in adult   • Personal history of colon cancer   • Bilateral primary osteoarthritis of knee   • Internal derangement of left knee   • Internal derangement of right knee   • Chronic pain of both knees        Past Medical History:   Diagnosis Date   • Abdominal pain     lower, hypo gastrium, s/p surgery      • Anomalous atrioventricular excitation    • Arthritis    • C. difficile colitis     Hx   • Colon cancer (HCC)    • Conduction disorder of the heart      Supraventricular      • Depression    • Drug therapy    • Encounter for screening for malignant neoplasm of colon    • Essential hypertension    • Family history of malignant neoplasm of digestive organ    • History of colon polyps    • History of malignant neoplasm of colon     s/p resection in 2009 and chemo   • History of malignant neoplasm of gastrointestinal tract    • History of transfusion    • Hyperaldosteronism (HCC)    • Hyperplastic polyp of large intestine    • Hypokalemia    • Intramural leiomyoma of uterus    • 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     Folate and B12 ordered      • Menstruation disorder     abnormal bleeding from female genital tract      • MRSA infection     Hx   • Screening for malignant neoplasm of breast    • Vitamin D deficiency    • Peter-Parkinson-White (WPW) pattern         Past Surgical History:   Procedure Laterality Date   • CARDIAC ABLATION     • CARDIAC CATHETERIZATION  2007    No significant coronary artery disease with normal left ventricular  function   • COLECTOMY PARTIAL / TOTAL  12/23/2009    Mid sigmoid obstructing colon cancer   • COLONOSCOPY N/A 06/29/2021    Procedure: COLONOSCOPY;  Surgeon: Jorge A Sheehan MD;  Location: [facility] ENDOSCOPY;  Service: Gastroenterology;  Laterality: N/A;   • COLONOSCOPY N/A 8/17/2022    Procedure: COLONOSCOPY;  Surgeon: Jorge A Sheehan MD;  Location: [facility] ENDOSCOPY;  Service: Gastroenterology;  Laterality: N/A;   • ECG CONVERTED  09/29/2008    Sinus rhythm with short SD. Minimal voltage criteria for LVH, may be normal variant T wave abnormality, consider inferolateral ischemia. Prolonged QT Abnormal ECG   • ENDOSCOPY AND COLONOSCOPY  06/27/2016    External and internal hemorrhoids. The examination was otherwise normal. No specimens collected   • HERNIA REPAIR     • TUBAL ABDOMINAL LIGATION     • UPPER GASTROINTESTINAL ENDOSCOPY  12/23/2009       Visit Dx:     ICD-10-CM ICD-9-CM   1. Right leg pain  M79.604 729.5   2. Acute pain of right thigh  M79.651 729.5        Medications (Admitted on 10/25/2022)       amitriptyline (ELAVIL) 50 MG tablet  amLODIPine (NORVASC) 10 MG tablet  lisinopril (PRINIVIL,ZESTRIL) 40 MG tablet  metoprolol tartrate (LOPRESSOR) 50 MG tablet  potassium chloride (KLOR-CON) 20 MEQ CR tablet  spironolactone (ALDACTONE) 25 MG tablet  Vitamin D, Cholecalciferol, (CHOLECALCIFEROL) 10 MCG (400 UNIT) tablet    Allergies        Tums [Calcium Carbonate Antacid]Anaphylaxis   Sulfa AntibioticsRash   PT Ortho     Row Name 10/25/22 0906       Subjective Comments    Subjective Comments 56 yo female with acute R posterior thigh pain, started 8 days ago. Was doing extensive walking and working alot at the time. No traumatic event, no fall. Reports she has a hamstrings injury. Went to Dr. Ayala last week who told her she had bilateral meniscus tears. Recent MRI's showing B lateral meniscus tear, as well as bilat chondromalacia. Fell and sprained her L ankle 4 months ago. Has been using a rollator for the  past week, used a cane intermittently before that. Works as a  for the local school district. Aggravating factors: walking, climbing stairs, squatting. Easing factors: rest, heating pad, tylenol. Hobbies: bowling. Patient goals: walk without pain.  -BS       Precautions and Contraindications    Precautions h/o colon cancer (2009)  -BS       Subjective Pain    Able to rate subjective pain? yes  -BS    Pre-Treatment Pain Level --  1.5/10 R hamstrings, R knee region  -BS    Post-Treatment Pain Level 3  -BS       Posture/Observations    Posture/Observations Comments positive extension lag noted with R knee with SLR's, no extension lag with L knee  -BS       Special Tests/Palpation    Special Tests/Palpation Knee  -BS       Knee Special Tests    Anterior drawer (ACL lesion) Bilateral:;Negative  positive laxity felt B knees  -BS    Posterior drawer (PCL lesion) Bilateral:;Negative  -BS    Valgus stress (MCL lesion) Bilateral:;Negative  -BS    Varus stress (LCL lesion) Bilateral:;Negative  -BS    Amelia’s test (meniscal lesion) Right:;Positive;Left:;Negative  -BS       General ROM    GENERAL ROM COMMENTS AROM: R knee 0-129°  L knee 0-130°  -BS       MMT (Manual Muscle Testing)    General MMT Comments MMT R LE-hip flex 4+/5 knee ext 4+/5 knee flex 4+/5 ankle DF 4/5 inv 4/5 fadumo 4/5 LLE-hip flex 5/5 knee flex 5/5 knee ext 4+/5 ankle DF 4+/5 inv 5/5 fadumo 4/5  -BS       Sensation    Light Touch No apparent deficits  -BS       Flexibility    Flexibility Tested? Lower Extremity  -BS       Lower Extremity Flexibility    Hamstrings Bilateral:;Severely limited  R 50 L 48 degrees w/ 90/90 SLR test  -BS    Hip Flexors Bilateral:;WNL  -BS    Quadriceps Left:;Mildly limited;Right:;WNL  -BS    ITB Bilateral:;WNL  -BS       Balance Skills Training    SLS 1 sec on L, 0 sec on R  -BS          User Key  (r) = Recorded By, (t) = Taken By, (c) = Cosigned By    Initials Name Provider Type    Mike Bullock PT Physical Therapist                                    PT OP Goals     Row Name 10/25/22 0085          PT Short Term Goals    STG Date to Achieve 11/08/22  -BS     STG 1 Improve R hip flex and R knee flex/ext MMT to 5/5  -BS     STG 1 Progress New  -BS     STG 2 Improve R hamstrings flexibility 5-10° with 90/90 SLR test position  -BS     STG 2 Progress New  -BS     STG 3 Reduce R knee pain by 25-50% with standing and walking  -BS     STG 3 Progress New  -BS     STG 4 mild to no antalgic gait pattern with R LE with use of least AD x 135' in the clinic  -BS     STG 4 Progress New  -BS        Time Calculation    PT Goal Re-Cert Due Date 11/15/22  -BS           User Key  (r) = Recorded By, (t) = Taken By, (c) = Cosigned By    Initials Name Provider Type    Mike Bullock, PT Physical Therapist                 PT Assessment/Plan     Row Name 10/25/22 1327          PT Assessment    Functional Limitations Impaired gait;Limitation in home management;Performance in work activities;Performance in sport activities;Performance in self-care ADL;Performance in leisure activities  -BS     Impairments Balance;Endurance;Gait;Impaired flexibility;Muscle strength;Pain  -BS     Assessment Comments 58 yo female with acute on chronic R knee pain due to a suspected R hamstrings strain ~1 weeks ago. Has co-morbidity of B meniscus tears and chondromalacia patella. Presents with a severely antalgic, R>L LE gait pattern with use of her rollator, R hip/knee weakness, R hamstrings tightness and loss in function. Would benefit from skilled PT to maximize her functional independence.  -BS     Please refer to paper survey for additional self-reported information No  -BS     Rehab Potential Fair  -BS     Patient/caregiver participated in establishment of treatment plan and goals Yes  -BS     Patient would benefit from skilled therapy intervention Yes  -BS        PT Plan    PT Frequency 1x/week;2x/week  based on ability to pay  -BS     Predicted Duration of Therapy  Intervention (PT) 3 weeks then TBD  -BS     Planned CPT's? PT EVAL MOD COMPLELITY: 54647;PT RE-EVAL: 03897;PT THER PROC EA 15 MIN: 93719;PT THER ACT EA 15 MIN: 41465;PT NEUROMUSC RE-EDUCATION EA 15 MIN: 43012;PT AQUATIC THERAPY EA 15 MIN: 58241;PT ELECTRICAL STIM UNATTEND: ;PT HOT/COLD PACK WC NONMCARE: 07397;PT THER SUPP EA 15 MIN;PT GAIT TRAINING EA 15 MIN: 73454;PT MANUAL THERAPY EA 15 MIN: 21398  -BS     Physical Therapy Interventions (Optional Details) aquatics exercise;balance training;gait training;home exercise program;manual therapy techniques;modalities;patient/family education;ROM (Range of Motion);strengthening;stretching;transfer training  -BS     PT Plan Comments Attempt land based therapy first. Emphasis on R quad/HS strengthening. Attempt aquatic therapy if land based PT proves ineffective at managing her symptoms.  -BS           User Key  (r) = Recorded By, (t) = Taken By, (c) = Cosigned By    Initials Name Provider Type    Mike Bullock, PT Physical Therapist                   OP Exercises     Row Name 10/25/22 7422             Subjective Comments    Subjective Comments 56 yo female with acute R posterior thigh pain, started 8 days ago. Was doing extensive walking and working alot at the time. No traumatic event, no fall. Reports she has a hamstrings injury. Went to Dr. Ayala last week who told her she had bilateral meniscus tears. Recent MRI's showing B lateral meniscus tear, as well as bilat chondromalacia. Fell and sprained her L ankle 4 months ago. Has been using a rollator for the past week, used a cane intermittently before that. Works as a  for the local school district. Aggravating factors: walking, climbing stairs, squatting. Easing factors: rest, heating pad, tylenol. Hobbies: bowling. Patient goals: walk without pain.  -BS         Subjective Pain    Able to rate subjective pain? yes  -BS      Pre-Treatment Pain Level --  1.5/10 R hamstrings, R knee region  -BS       "Post-Treatment Pain Level 3  -BS         Exercise 1    Exercise Name 1 supine R HS S w/ towel  -BS      Sets 1 1  -BS      Reps 1 1  -BS      Time 1 30\" hold  -BS         Exercise 2    Exercise Name 2 B SLR  -BS      Sets 2 1  -BS      Reps 2 10  -BS         Exercise 3    Exercise Name 3 sit to stand, B UE A  -BS      Sets 3 1  -BS      Reps 3 5  -BS         Exercise 4    Exercise Name 4 seated resisted HS curls w/ red TB  -BS      Sets 4 1  -BS      Reps 4 10  -BS      Additional Comments R only  -BS         Exercise 5    Exercise Name 5 seated resisted LAQ's, R w/ red TB  -BS      Sets 5 1  -BS      Reps 5 10  -BS            User Key  (r) = Recorded By, (t) = Taken By, (c) = Cosigned By    Initials Name Provider Type    BS Mike Christensen, PT Physical Therapist                                        Time Calculation:     Start Time: 1350  Stop Time: 1437  Time Calculation (min): 47 min  Total Timed Code Minutes- PT: 47 minute(s)     Therapy Charges for Today     Code Description Service Date Service Provider Modifiers Qty    08195708514  PT EVAL MOD COMPLEXITY 3 10/25/2022 Mike Christensen, PT GP 1    [id number] HC PT THER PROC EA 15 MIN 10/25/2022 Mike Christensen, PT GP 1                    Mike Christensen, PT  10/25/2022      "

## 2022-10-28 ENCOUNTER — HOSPITAL ENCOUNTER (OUTPATIENT)
Dept: PHYSICAL THERAPY | Facility: HOSPITAL | Age: 57
Setting detail: THERAPIES SERIES
Discharge: HOME | End: 2022-10-28

## 2022-10-28 DIAGNOSIS — M79.604 RIGHT LEG PAIN: Primary | ICD-10-CM

## 2022-10-28 DIAGNOSIS — M79.651 ACUTE PAIN OF RIGHT THIGH: ICD-10-CM

## 2022-10-28 PROCEDURE — 97110 THERAPEUTIC EXERCISES: CPT | Performed by: PHYSICAL THERAPIST

## 2022-10-28 NOTE — THERAPY TREATMENT NOTE
Outpatient Physical Therapy Ortho Treatment Note  [facility]     Patient Name: Pati Sotelo  : 1965  MRN: [id number]  Today's Date: 10/28/2022      Visit Date: 10/28/2022  Visit   Return to MD:RADHA  Re-cert date: 11/15/22    Visit Dx:    ICD-10-CM ICD-9-CM   1. Right leg pain  M79.604 729.5   2. Acute pain of right thigh  M79.651 729.5       Patient Active Problem List   Diagnosis   • Peter-Parkinson-White (WPW) pattern   • Essential hypertension   • Depression   • Class 1 obesity due to excess calories with serious comorbidity and body mass index (BMI) of 31.0 to 31.9 in adult   • Personal history of colon cancer   • Bilateral primary osteoarthritis of knee   • Internal derangement of left knee   • Internal derangement of right knee   • Chronic pain of both knees        Past Medical History:   Diagnosis Date   • Abdominal pain     lower, hypo gastrium, s/p surgery      • Anomalous atrioventricular excitation    • Arthritis    • C. difficile colitis     Hx   • Colon cancer (HCC)    • Conduction disorder of the heart      Supraventricular      • Depression    • Drug therapy    • Encounter for screening for malignant neoplasm of colon    • Essential hypertension    • Family history of malignant neoplasm of digestive organ    • History of colon polyps    • History of malignant neoplasm of colon     s/p resection in 2009 and chemo   • History of malignant neoplasm of gastrointestinal tract    • History of transfusion    • Hyperaldosteronism (HCC)    • Hyperplastic polyp of large intestine    • Hypokalemia    • Intramural leiomyoma of uterus    • 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     Folate and B12 ordered      • Menstruation disorder     abnormal bleeding from female genital tract      • MRSA infection     Hx   • Screening for malignant neoplasm of breast    • Vitamin D deficiency    • Peter-Parkinson-White (WPW) pattern         Past Surgical History:   Procedure Laterality Date   • CARDIAC  ABLATION     • CARDIAC CATHETERIZATION  07/02/2007    No significant coronary artery disease with normal left ventricular function   • COLECTOMY PARTIAL / TOTAL  12/23/2009    Mid sigmoid obstructing colon cancer   • COLONOSCOPY N/A 06/29/2021    Procedure: COLONOSCOPY;  Surgeon: Jorge A Sheehan MD;  Location: [facility] ENDOSCOPY;  Service: Gastroenterology;  Laterality: N/A;   • COLONOSCOPY N/A 8/17/2022    Procedure: COLONOSCOPY;  Surgeon: Jorge A Sheehan MD;  Location: [facility] ENDOSCOPY;  Service: Gastroenterology;  Laterality: N/A;   • ECG CONVERTED  09/29/2008    Sinus rhythm with short [place]. Minimal voltage criteria for LVH, may be normal variant T wave abnormality, consider inferolateral ischemia. Prolonged QT Abnormal ECG   • ENDOSCOPY AND COLONOSCOPY  06/27/2016    External and internal hemorrhoids. The examination was otherwise normal. No specimens collected   • HERNIA REPAIR     • TUBAL ABDOMINAL LIGATION     • UPPER GASTROINTESTINAL ENDOSCOPY  12/23/2009        PT Ortho     Row Name 10/28/22 3302       Subjective Comments    Subjective Comments Patient reports on her feet too much today with chores and running errands and feels it now in her R knee.  -BS       Precautions and Contraindications    Precautions h/o colon cancer (2009)  -BS       Subjective Pain    Able to rate subjective pain? yes  -BS    Pre-Treatment Pain Level 3  -BS    Post-Treatment Pain Level 5  -BS          User Key  (r) = Recorded By, (t) = Taken By, (c) = Cosigned By    Initials Name Provider Type    BS Mike Christensen, PT Physical Therapist                             PT Assessment/Plan     Row Name 10/28/22 9165          PT Assessment    Assessment Comments Presents with good tolerance to R knee strengthening. Ataxic and unsteady gait pattern with use of rollator ambulating around the clinic. Required one rest break due to hamstrings region pain and fatigue. Increased R knee pain post tx.  -BS        PT Plan    PT Frequency 2x/week   "-BS     Predicted Duration of Therapy Intervention (PT) 3 weeks then TBD  -BS     PT Plan Comments continue hamstrings stretching manually, attempt MFR to R hamstrings as well. Emphasize quad control. Resisted TKE w/ TB.  -BS           User Key  (r) = Recorded By, (t) = Taken By, (c) = Cosigned By    Initials Name Provider Type    Mike Bullock, PT Physical Therapist                   OP Exercises     Row Name 10/28/22 1515             Subjective Comments    Subjective Comments Patient reports on her feet too much today with chores and running errands and feels it now in her R knee.  -BS         Subjective Pain    Able to rate subjective pain? yes  -BS      Pre-Treatment Pain Level 3  -BS      Post-Treatment Pain Level 5  -BS         Exercise 1    Exercise Name 1 supine R HS S w/ towel  -BS      Sets 1 1  -BS      Reps 1 3  -BS      Time 1 30\" hold  -BS         Exercise 2    Exercise Name 2 R SLR  -BS      Sets 2 2  -BS      Reps 2 10  -BS      Time 2 5\" hold  -BS         Exercise 3    Exercise Name 3 Pro II, L2  -BS      Time 3 10'  -BS         Exercise 4    Exercise Name 4 prone HS curls w/ 3 lb aw  -BS      Sets 4 2  -BS      Reps 4 10  -BS      Additional Comments R only  -BS         Exercise 5    Exercise Name 5 LAQ's w/ 3 lb aw  -BS      Sets 5 1  -BS      Reps 5 20  -BS      Additional Comments R only  -BS         Exercise 6    Exercise Name 6 ambulation 135' x 2 mod I with rollator  -BS      Time 6 8 mins  -BS            User Key  (r) = Recorded By, (t) = Taken By, (c) = Cosigned By    Initials Name Provider Type    Mike Bullock, PT Physical Therapist                              PT OP Goals     Row Name 10/28/22 1511          PT Short Term Goals    STG Date to Achieve 11/08/22  -BS     STG 1 Improve R hip flex and R knee flex/ext MMT to 5/5  -BS     STG 1 Progress Ongoing  -BS     STG 2 Improve R hamstrings flexibility 5-10° with 90/90 SLR test position  -BS     STG 2 Progress Ongoing  -BS     STG " 3 Reduce R knee pain by 25-50% with standing and walking  -BS     STG 3 Progress Ongoing  -BS     STG 4 mild to no antalgic gait pattern with R LE with use of least AD x 135' in the clinic  -BS     STG 4 Progress Ongoing  -BS        Time Calculation    PT Goal Re-Cert Due Date 11/15/22  -BS           User Key  (r) = Recorded By, (t) = Taken By, (c) = Cosigned By    Initials Name Provider Type    Mike Bullock, PT Physical Therapist                               Time Calculation:   Start Time: 1515  Stop Time: 1555  Time Calculation (min): 40 min  Total Timed Code Minutes- PT: 40 minute(s)  Therapy Charges for Today     Code Description Service Date Service Provider Modifiers Qty    27835871364 HC PT THER PROC EA 15 MIN 10/28/2022 Mike Christensen, PT GP 3                    Mike Christensen, PT  10/28/2022

## 2022-11-01 ENCOUNTER — HOSPITAL ENCOUNTER (OUTPATIENT)
Dept: PHYSICAL THERAPY | Facility: HOSPITAL | Age: 57
Setting detail: THERAPIES SERIES
Discharge: HOME | End: 2022-11-01

## 2022-11-01 DIAGNOSIS — M79.651 ACUTE PAIN OF RIGHT THIGH: ICD-10-CM

## 2022-11-01 DIAGNOSIS — M79.604 RIGHT LEG PAIN: Primary | ICD-10-CM

## 2022-11-01 PROCEDURE — 97140 MANUAL THERAPY 1/> REGIONS: CPT

## 2022-11-01 PROCEDURE — 97110 THERAPEUTIC EXERCISES: CPT

## 2022-11-01 NOTE — THERAPY TREATMENT NOTE
"    Outpatient Physical Therapy Ortho Treatment Note  [facility]     Patient Name: Pati Sotelo  : 1965  MRN: [id number]  Today's Date: 2022      Visit Date: 2022     Subjective Improvement: \"some\"  Attendance:  3/3 (90/yr)  Next MD Visit : TBD  Recert Date:  11/15/22      Therapy Diagnosis:  R Hamstring Pain         Visit Dx:    ICD-10-CM ICD-9-CM   1. Right leg pain  M79.604 729.5   2. Acute pain of right thigh  M79.651 729.5       Patient Active Problem List   Diagnosis   • Peter-Parkinson-White (WPW) pattern   • Essential hypertension   • Depression   • Class 1 obesity due to excess calories with serious comorbidity and body mass index (BMI) of 31.0 to 31.9 in adult   • Personal history of colon cancer   • Bilateral primary osteoarthritis of knee   • Internal derangement of left knee   • Internal derangement of right knee   • Chronic pain of both knees        Past Medical History:   Diagnosis Date   • Abdominal pain     lower, hypo gastrium, s/p surgery      • Anomalous atrioventricular excitation    • Arthritis    • C. difficile colitis     Hx   • Colon cancer (HCC)    • Conduction disorder of the heart      Supraventricular      • Depression    • Drug therapy    • Encounter for screening for malignant neoplasm of colon    • Essential hypertension    • Family history of malignant neoplasm of digestive organ    • History of colon polyps    • History of malignant neoplasm of colon     s/p resection in 2009 and chemo   • History of malignant neoplasm of gastrointestinal tract    • History of transfusion    • Hyperaldosteronism (HCC)    • Hyperplastic polyp of large intestine    • Hypokalemia    • Intramural leiomyoma of uterus    • 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     Folate and B12 ordered      • Menstruation disorder     abnormal bleeding from female genital tract      • MRSA infection     Hx   • Screening for malignant neoplasm of breast    • Vitamin D deficiency    • " Peter-Parkinson-White (WPW) pattern         Past Surgical History:   Procedure Laterality Date   • CARDIAC ABLATION     • CARDIAC CATHETERIZATION  07/02/2007    No significant coronary artery disease with normal left ventricular function   • COLECTOMY PARTIAL / TOTAL  12/23/2009    Mid sigmoid obstructing colon cancer   • COLONOSCOPY N/A 06/29/2021    Procedure: COLONOSCOPY;  Surgeon: Jorge A Sheehan MD;  Location: [facility] ENDOSCOPY;  Service: Gastroenterology;  Laterality: N/A;   • COLONOSCOPY N/A 8/17/2022    Procedure: COLONOSCOPY;  Surgeon: Jorge A Sheehan MD;  Location: [facility] ENDOSCOPY;  Service: Gastroenterology;  Laterality: N/A;   • ECG CONVERTED  09/29/2008    Sinus rhythm with short [place]. Minimal voltage criteria for LVH, may be normal variant T wave abnormality, consider inferolateral ischemia. Prolonged QT Abnormal ECG   • ENDOSCOPY AND COLONOSCOPY  06/27/2016    External and internal hemorrhoids. The examination was otherwise normal. No specimens collected   • HERNIA REPAIR     • TUBAL ABDOMINAL LIGATION     • UPPER GASTROINTESTINAL ENDOSCOPY  12/23/2009        PT Ortho     Row Name 11/01/22 1400       Precautions and Contraindications    Precautions h/o colon cancer (2009)  -       Posture/Observations    Posture/Observations Comments gait with rollator and decreased knee flexion noted with gait; trigger noted in medial proximal hamstring  -          User Key  (r) = Recorded By, (t) = Taken By, (c) = Cosigned By    Initials Name Provider Type    Clemencia Rehman, [place] Physical Therapist Assistant                             PT Assessment/Plan     Row Name 11/01/22 1400          PT Assessment    Functional Limitations Impaired gait;Limitation in home management;Performance in work activities;Performance in sport activities;Performance in self-care ADL;Performance in leisure activities  -     Impairments Balance;Endurance;Gait;Impaired flexibility;Muscle strength;Pain  -     Assessment Comments good  "response to manual to hamstring with improved tissue mobility and tenderness; Educated pt to use heat or ice at home to help with pain as needed; no change in HEP; did have some discomfort with prone TKEs in the hamstrings.  -KH     Rehab Potential Fair  -KH     Patient/caregiver participated in establishment of treatment plan and goals Yes  -KH     Patient would benefit from skilled therapy intervention Yes  -KH        PT Plan    PT Frequency 1x/week;2x/week  based on ability to pay  -IVA     Predicted Duration of Therapy Intervention (PT) 3 weeks then TBD  -IVA     PT Plan Comments Continue with current POC: progress quad strength and hamstring mobility;  -           User Key  (r) = Recorded By, (t) = Taken By, (c) = Cosigned By    Initials Name Provider Type    Clemencia Rehman PTA Physical Therapist Assistant                   OP Exercises     Row Name 11/01/22 1400             Subjective Comments    Subjective Comments Patient reports that she does feel it is getting some better; states that she is getting around the house better but still having pain in the posterior thigh on R  -KH         Subjective Pain    Able to rate subjective pain? yes  -KH      Pre-Treatment Pain Level 3  -KH      Post-Treatment Pain Level --  \"better\"  -KH         Exercise 1    Exercise Name 1 pro ll LE ROM  -KH      Time 1 10 mins  -KH      Additional Comments level 2; seat 8  -KH         Exercise 2    Exercise Name 2 SKTC B  -KH      Cueing 2 Verbal;Tactile  -KH      Sets 2 3  -KH      Time 2 30\" holds  -KH         Exercise 3    Exercise Name 3 suping hamstring stretch R only  -KH      Cueing 3 Verbal;Tactile  -KH      Sets 3 3  -KH      Time 3 30\" holds  -KH         Exercise 4    Exercise Name 4 Bridges w/ adduction squeeze  -KH      Cueing 4 Verbal;Tactile  -KH      Sets 4 2  -KH      Reps 4 10  -KH         Exercise 5    Exercise Name 5 prone TKEs  -KH      Cueing 5 Verbal;Tactile  -KH      Sets 5 2  -KH      Reps 5 10  -KH      " "Time 5 5\" holds  -         Exercise 6    Exercise Name 6 Manual: gentle STM to R hamstring  -      Time 6 8 mins  -         Exercise 7    Exercise Name 7 gait with rollator  -      Reps 7 270ft x1  -            User Key  (r) = Recorded By, (t) = Taken By, (c) = Cosigned By    Initials Name Provider Type    Clemencia Rehman PTA Physical Therapist Assistant                              PT OP Goals     Row Name 11/01/22 1400          PT Short Term Goals    STG Date to Achieve 11/08/22  -     STG 1 Improve R hip flex and R knee flex/ext MMT to 5/5  -KH     STG 2 Improve R hamstrings flexibility 5-10° with 90/90 SLR test position  -     STG 3 Reduce R knee pain by 25-50% with standing and walking  -     STG 4 mild to no antalgic gait pattern with R LE with use of least AD x 135' in the clinic  -        Time Calculation    PT Goal Re-Cert Due Date 11/15/22  -           User Key  (r) = Recorded By, (t) = Taken By, (c) = Cosigned By    Initials Name Provider Type    Clemencia Rehman PTA Physical Therapist Assistant                               Time Calculation:   Start Time: 1431  Stop Time: 1511  Time Calculation (min): 40 min  Therapy Charges for Today     Code Description Service Date Service Provider Modifiers Qty    86439655322  PT MANUAL THERAPY EA 15 MIN 11/1/2022 Clemencia Baltazar PTA GP, CQ 1    62986072156 HC PT THER PROC EA 15 MIN 11/1/2022 Clemencia Baltazar PTA GP, CQ 2                    Clemencia Baltazar PTA  11/1/2022     "

## 2022-11-02 ENCOUNTER — HOSPITAL ENCOUNTER (OUTPATIENT)
Dept: PHYSICAL THERAPY | Facility: HOSPITAL | Age: 57
Setting detail: THERAPIES SERIES
Discharge: HOME | End: 2022-11-02

## 2022-11-02 DIAGNOSIS — M79.651 ACUTE PAIN OF RIGHT THIGH: ICD-10-CM

## 2022-11-02 DIAGNOSIS — G89.29 BILATERAL CHRONIC KNEE PAIN: ICD-10-CM

## 2022-11-02 DIAGNOSIS — M79.604 RIGHT LEG PAIN: Primary | ICD-10-CM

## 2022-11-02 DIAGNOSIS — M25.561 BILATERAL CHRONIC KNEE PAIN: ICD-10-CM

## 2022-11-02 DIAGNOSIS — M25.562 BILATERAL CHRONIC KNEE PAIN: ICD-10-CM

## 2022-11-02 PROCEDURE — 97110 THERAPEUTIC EXERCISES: CPT

## 2022-11-02 PROCEDURE — 97140 MANUAL THERAPY 1/> REGIONS: CPT

## 2022-11-02 NOTE — THERAPY TREATMENT NOTE
Outpatient Physical Therapy Ortho Treatment Note  [facility]     Patient Name: Pati Sotelo  : 1965  MRN: [id number]  Today's Date: 2022      Visit Date: 2022     Subjective Improvement: 50%  Attendance:   (90/yr)  Next MD Visit : 22  Recert Date:  11/15/22        Therapy Diagnosis:  R Hamstring Pain        Visit Dx:    ICD-10-CM ICD-9-CM   1. Right leg pain  M79.604 729.5   2. Acute pain of right thigh  M79.651 729.5   3. Bilateral chronic knee pain  M25.561 719.46    M25.562 338.29    G89.29        Patient Active Problem List   Diagnosis   • Peter-Parkinson-White (WPW) pattern   • Essential hypertension   • Depression   • Class 1 obesity due to excess calories with serious comorbidity and body mass index (BMI) of 31.0 to 31.9 in adult   • Personal history of colon cancer   • Bilateral primary osteoarthritis of knee   • Internal derangement of left knee   • Internal derangement of right knee   • Chronic pain of both knees        Past Medical History:   Diagnosis Date   • Abdominal pain     lower, hypo gastrium, s/p surgery      • Anomalous atrioventricular excitation    • Arthritis    • C. difficile colitis     Hx   • Colon cancer (HCC)    • Conduction disorder of the heart      Supraventricular      • Depression    • Drug therapy    • Encounter for screening for malignant neoplasm of colon    • Essential hypertension    • Family history of malignant neoplasm of digestive organ    • History of colon polyps    • History of malignant neoplasm of colon     s/p resection in 2009 and chemo   • History of malignant neoplasm of gastrointestinal tract    • History of transfusion    • Hyperaldosteronism (HCC)    • Hyperplastic polyp of large intestine    • Hypokalemia    • Intramural leiomyoma of uterus    • 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     Folate and B12 ordered      • Menstruation disorder     abnormal bleeding from female genital tract      • MRSA infection     Hx   •  Screening for malignant neoplasm of breast    • Vitamin D deficiency    • Peter-Parkinson-White (WPW) pattern         Past Surgical History:   Procedure Laterality Date   • CARDIAC ABLATION     • CARDIAC CATHETERIZATION  07/02/2007    No significant coronary artery disease with normal left ventricular function   • COLECTOMY PARTIAL / TOTAL  12/23/2009    Mid sigmoid obstructing colon cancer   • COLONOSCOPY N/A 06/29/2021    Procedure: COLONOSCOPY;  Surgeon: Jorge A Sheehan MD;  Location: [facility] ENDOSCOPY;  Service: Gastroenterology;  Laterality: N/A;   • COLONOSCOPY N/A 8/17/2022    Procedure: COLONOSCOPY;  Surgeon: Jorge A Sheehan MD;  Location: [facility] ENDOSCOPY;  Service: Gastroenterology;  Laterality: N/A;   • ECG CONVERTED  09/29/2008    Sinus rhythm with short [place]. Minimal voltage criteria for LVH, may be normal variant T wave abnormality, consider inferolateral ischemia. Prolonged QT Abnormal ECG   • ENDOSCOPY AND COLONOSCOPY  06/27/2016    External and internal hemorrhoids. The examination was otherwise normal. No specimens collected   • HERNIA REPAIR     • TUBAL ABDOMINAL LIGATION     • UPPER GASTROINTESTINAL ENDOSCOPY  12/23/2009        PT Ortho     Row Name 11/02/22 1500       Precautions and Contraindications    Precautions h/o colon cancer (2009)  -IVA       Posture/Observations    Posture/Observations Comments gait with rollator; ataxic gait noted; mild tightness noted in lateral hamstring.  -IVA          User Key  (r) = Recorded By, (t) = Taken By, (c) = Cosigned By    Initials Name Provider Type    Clemencia Rehman PTA Physical Therapist Assistant                             PT Assessment/Plan     Row Name 11/02/22 1600          PT Assessment    Functional Limitations Impaired gait;Limitation in home management;Performance in work activities;Performance in sport activities;Performance in self-care ADL;Performance in leisure activities  -IVA     Impairments Balance;Endurance;Gait;Impaired  "flexibility;Muscle strength;Pain  -     Assessment Comments good tolerance to treatment and did well with progression of CKC without increased pain  -KH     Rehab Potential Fair  -KH     Patient/caregiver participated in establishment of treatment plan and goals Yes  -KH     Patient would benefit from skilled therapy intervention Yes  -KH        PT Plan    PT Frequency 2x/week;1x/week  -     Predicted Duration of Therapy Intervention (PT) 3 weeks then TBD  -KH     PT Plan Comments patient to bring cane next visit; Progress CKC as able. Manual as needed; Check leg length next visit;  -           User Key  (r) = Recorded By, (t) = Taken By, (c) = Cosigned By    Initials Name Provider Type    Clemencia Rehman PTA Physical Therapist Assistant                 Modalities     Row Name 11/02/22 1500             Subjective Pain    Post-Treatment Pain Level --  \"sore\"  -            User Key  (r) = Recorded By, (t) = Taken By, (c) = Cosigned By    Initials Name Provider Type    Clemencia Rehman PTA Physical Therapist Assistant               OP Exercises     Row Name 11/02/22 1500             Subjective Comments    Subjective Comments patient continues to report that she is gettng better everyday; Reporting that she is 50% better overall; Was a little sore after last treatment;  -KH         Subjective Pain    Able to rate subjective pain? yes  -KH      Pre-Treatment Pain Level 1  -KH      Post-Treatment Pain Level --  \"sore\"  -         Exercise 1    Exercise Name 1 pro ll LE strength/ROM  -KH      Time 1 10 mins  -KH      Additional Comments level 2; seat 8  -KH         Exercise 2    Exercise Name 2 incline stretch  -KH      Sets 2 3  -KH      Time 2 30\" holds  -KH         Exercise 3    Exercise Name 3 standing hamstring stretch  -KH      Sets 3 3  -KH      Time 3 30\" holds  -KH         Exercise 4    Exercise Name 4 CR/TR  -KH      Sets 4 2  -KH      Reps 4 10  -KH         Exercise 5    Exercise Name 5 step ups fwd  -KH " "     Sets 5 2  -KH      Reps 5 10  -KH      Additional Comments 4\" step  -KH         Exercise 6    Exercise Name 6 LAQ  -KH      Sets 6 2  -KH      Reps 6 10  -KH         Exercise 7    Exercise Name 7 sit to stands  -KH      Sets 7 2  -KH      Reps 7 10  -KH         Exercise 8    Exercise Name 8 Manual: STM to proximal hamstring  -      Time 8 10 mins  -            User Key  (r) = Recorded By, (t) = Taken By, (c) = Cosigned By    Initials Name Provider Type    Clemencia Rehman PTA Physical Therapist Assistant                              PT OP Goals     Row Name 11/02/22 1500          PT Short Term Goals    STG Date to Achieve 11/08/22  -     STG 1 Improve R hip flex and R knee flex/ext MMT to 5/5  -KH     STG 2 Improve R hamstrings flexibility 5-10° with 90/90 SLR test position  -     STG 3 Reduce R knee pain by 25-50% with standing and walking  -     STG 4 mild to no antalgic gait pattern with R LE with use of least AD x 135' in the clinic  -        Time Calculation    PT Goal Re-Cert Due Date 11/15/22  -           User Key  (r) = Recorded By, (t) = Taken By, (c) = Cosigned By    Initials Name Provider Type    Clemencia Rehman PTA Physical Therapist Assistant                               Time Calculation:   Start Time: 1510  Stop Time: 1550  Time Calculation (min): 40 min  Therapy Charges for Today     Code Description Service Date Service Provider Modifiers Qty    44096473899 HC PT MANUAL THERAPY EA 15 MIN 11/2/2022 Clemencia Baltazar PTA GP, CQ 1    60353337817 HC PT THER PROC EA 15 MIN 11/2/2022 Clemencia Baltazar PTA GP, CQ 2                    Clemencia Baltazar PTA  11/2/2022     "

## 2022-11-08 ENCOUNTER — HOSPITAL ENCOUNTER (OUTPATIENT)
Dept: PHYSICAL THERAPY | Facility: HOSPITAL | Age: 57
Setting detail: THERAPIES SERIES
Discharge: HOME | End: 2022-11-08

## 2022-11-08 DIAGNOSIS — M79.604 RIGHT LEG PAIN: Primary | ICD-10-CM

## 2022-11-08 DIAGNOSIS — M79.651 ACUTE PAIN OF RIGHT THIGH: ICD-10-CM

## 2022-11-08 PROCEDURE — 97110 THERAPEUTIC EXERCISES: CPT

## 2022-11-08 NOTE — THERAPY TREATMENT NOTE
Outpatient Physical Therapy Ortho Treatment Note  [facility]     Patient Name: Pati Sotelo  : 1965  MRN: [id number]  Today's Date: 2022      Visit Date: 2022     Subjective Improvement: 50%  Attendance:   (90/yr)  Next MD Visit : 22  Recert Date:  11/15/22        Therapy Diagnosis:  R Hamstring Pain     Visit Dx:    ICD-10-CM ICD-9-CM   1. Right leg pain  M79.604 729.5   2. Acute pain of right thigh  M79.651 729.5       Patient Active Problem List   Diagnosis   • Peter-Parkinson-White (WPW) pattern   • Essential hypertension   • Depression   • Class 1 obesity due to excess calories with serious comorbidity and body mass index (BMI) of 31.0 to 31.9 in adult   • Personal history of colon cancer   • Bilateral primary osteoarthritis of knee   • Internal derangement of left knee   • Internal derangement of right knee   • Chronic pain of both knees        Past Medical History:   Diagnosis Date   • Abdominal pain     lower, hypo gastrium, s/p surgery      • Anomalous atrioventricular excitation    • Arthritis    • C. difficile colitis     Hx   • Colon cancer (HCC)    • Conduction disorder of the heart      Supraventricular      • Depression    • Drug therapy    • Encounter for screening for malignant neoplasm of colon    • Essential hypertension    • Family history of malignant neoplasm of digestive organ    • History of colon polyps    • History of malignant neoplasm of colon     s/p resection in 2009 and chemo   • History of malignant neoplasm of gastrointestinal tract    • History of transfusion    • Hyperaldosteronism (HCC)    • Hyperplastic polyp of large intestine    • Hypokalemia    • Intramural leiomyoma of uterus    • 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     Folate and B12 ordered      • Menstruation disorder     abnormal bleeding from female genital tract      • MRSA infection     Hx   • Screening for malignant neoplasm of breast    • Vitamin D deficiency    •  Peter-Parkinson-White (WPW) pattern         Past Surgical History:   Procedure Laterality Date   • CARDIAC ABLATION     • CARDIAC CATHETERIZATION  07/02/2007    No significant coronary artery disease with normal left ventricular function   • COLECTOMY PARTIAL / TOTAL  12/23/2009    Mid sigmoid obstructing colon cancer   • COLONOSCOPY N/A 06/29/2021    Procedure: COLONOSCOPY;  Surgeon: Jorge A Sheehan MD;  Location: [facility] ENDOSCOPY;  Service: Gastroenterology;  Laterality: N/A;   • COLONOSCOPY N/A 8/17/2022    Procedure: COLONOSCOPY;  Surgeon: Jorge A Sheehan MD;  Location: [facility] ENDOSCOPY;  Service: Gastroenterology;  Laterality: N/A;   • ECG CONVERTED  09/29/2008    Sinus rhythm with short DE. Minimal voltage criteria for LVH, may be normal variant T wave abnormality, consider inferolateral ischemia. Prolonged QT Abnormal ECG   • ENDOSCOPY AND COLONOSCOPY  06/27/2016    External and internal hemorrhoids. The examination was otherwise normal. No specimens collected   • HERNIA REPAIR     • TUBAL ABDOMINAL LIGATION     • UPPER GASTROINTESTINAL ENDOSCOPY  12/23/2009        PT Ortho     Row Name 11/08/22 1600       Precautions and Contraindications    Precautions h/o colon cancer (2009)  -       Posture/Observations    Posture/Observations Comments using SPC for gait; gait ataxic at times; hyperextension of L knee with gait  -          User Key  (r) = Recorded By, (t) = Taken By, (c) = Cosigned By    Initials Name Provider Type    Clemencia Rehman PTA Physical Therapist Assistant                             PT Assessment/Plan     Row Name 11/08/22 1600          PT Assessment    Functional Limitations Impaired gait;Limitation in home management;Performance in work activities;Performance in sport activities;Performance in self-care ADL;Performance in leisure activities  -     Impairments Balance;Endurance;Gait;Impaired flexibility;Muscle strength;Pain  -     Assessment Comments did well with standing exercises;  "placed heel lift in L shoe secondary to LLD noted after ME to R anterior torison; Did help with hyperextension of the L knee some;  -KH     Rehab Potential Fair  -KH     Patient/caregiver participated in establishment of treatment plan and goals Yes  -KH     Patient would benefit from skilled therapy intervention Yes  -KH        PT Plan    PT Frequency 2x/week;1x/week  -IVA     Predicted Duration of Therapy Intervention (PT) 3 weeks then TBD  -KH     PT Plan Comments Continue to progress with strength, gait and balance  -           User Key  (r) = Recorded By, (t) = Taken By, (c) = Cosigned By    Initials Name Provider Type    Clemencia Rehman PTA Physical Therapist Assistant                 Modalities     Row Name 11/08/22 1600             Subjective Comments    Subjective Comments Patient reports that she has been using the SPC since last visit and even went to [facility] on Sunday;  -            User Key  (r) = Recorded By, (t) = Taken By, (c) = Cosigned By    Initials Name Provider Type    Clemencia Rehman PTA Physical Therapist Assistant               OP Exercises     Row Name 11/08/22 1600             Subjective Comments    Subjective Comments Patient reports that she has been using the SPC since last visit and even went to [facility] on Sunday;  -KH         Subjective Pain    Able to rate subjective pain? yes  -      Pre-Treatment Pain Level 1  -KH         Exercise 1    Exercise Name 1 pro ll LE strength  -KH      Time 1 10 mins  -KH      Additional Comments level 2; seat 6  -KH         Exercise 2    Exercise Name 2 standing hamstring stretch  -KH      Sets 2 3  -KH      Time 2 30\" holds  -KH         Exercise 3    Exercise Name 3 step ups fwd & lat  -KH      Sets 3 2  -KH      Reps 3 10 each  -KH      Additional Comments 4\" step  -KH         Exercise 4    Exercise Name 4 Measured LL  -KH      Additional Comments R 78 cm; L 77cm  placed small heel lift in L shoe with 2 levels; educated pt to wear it but if it " started to cause pain to take it out;  -KH         Exercise 5    Exercise Name 5 Standing Hip Abduction  -KH      Sets 5 2  -KH      Reps 5 10  -KH      Time 5 B  -KH         Exercise 6    Exercise Name 6 Standing Hip extension B  -KH      Sets 6 2  -KH      Reps 6 10  -KH      Time 6 B  -KH         Exercise 7    Exercise Name 7 Standing hip flexion (march)  -KH      Sets 7 2  -KH      Reps 7 10  -KH      Time 7 B  -KH         Exercise 8    Exercise Name 8 CR/TR  -KH      Sets 8 2  -KH      Reps 8 10  -KH            User Key  (r) = Recorded By, (t) = Taken By, (c) = Cosigned By    Initials Name Provider Type    Clemencia Rehman PTA Physical Therapist Assistant                              PT OP Goals     Row Name 11/08/22 1600          PT Short Term Goals    STG Date to Achieve 11/08/22  -KH     STG 1 Improve R hip flex and R knee flex/ext MMT to 5/5  -KH     STG 2 Improve R hamstrings flexibility 5-10° with 90/90 SLR test position  -     STG 3 Reduce R knee pain by 25-50% with standing and walking  -KH     STG 4 mild to no antalgic gait pattern with R LE with use of least AD x 135' in the clinic  -        Time Calculation    PT Goal Re-Cert Due Date 11/15/22  -KH           User Key  (r) = Recorded By, (t) = Taken By, (c) = Cosigned By    Initials Name Provider Type    Clemencia Rehman PTA Physical Therapist Assistant                               Time Calculation:   Start Time: 1601  Stop Time: 1641  Time Calculation (min): 40 min  Therapy Charges for Today     Code Description Service Date Service Provider Modifiers Qty    [id number]  PT THER PROC EA 15 MIN 11/8/2022 Clemencia Baltazar PTA GP, CQ 3                    Clemencia Baltazar PTA  11/8/2022

## 2022-11-10 ENCOUNTER — HOSPITAL ENCOUNTER (OUTPATIENT)
Dept: PHYSICAL THERAPY | Facility: HOSPITAL | Age: 57
Setting detail: THERAPIES SERIES
Discharge: HOME | End: 2022-11-10

## 2022-11-10 ENCOUNTER — OFFICE VISIT (OUTPATIENT)
Dept: GASTROENTEROLOGY | Facility: CLINIC | Age: 57
End: 2022-11-10

## 2022-11-10 VITALS
SYSTOLIC BLOOD PRESSURE: 141 MMHG | DIASTOLIC BLOOD PRESSURE: 95 MMHG | BODY MASS INDEX: 32.21 KG/M2 | HEART RATE: 70 BPM | HEIGHT: 63 IN | WEIGHT: 181.8 LBS

## 2022-11-10 DIAGNOSIS — M79.604 RIGHT LEG PAIN: Primary | ICD-10-CM

## 2022-11-10 DIAGNOSIS — M79.651 ACUTE PAIN OF RIGHT THIGH: ICD-10-CM

## 2022-11-10 DIAGNOSIS — Z85.038 PERSONAL HISTORY OF COLON CANCER: ICD-10-CM

## 2022-11-10 DIAGNOSIS — Z12.11 ENCOUNTER FOR SCREENING FOR MALIGNANT NEOPLASM OF COLON: Primary | ICD-10-CM

## 2022-11-10 PROCEDURE — S0285 CNSLT BEFORE SCREEN COLONOSC: HCPCS | Performed by: INTERNAL MEDICINE

## 2022-11-10 PROCEDURE — 97110 THERAPEUTIC EXERCISES: CPT | Performed by: PHYSICAL THERAPIST

## 2022-11-10 RX ORDER — SOD SULF/POT CHLORIDE/MAG SULF 1.479 G
1 TABLET ORAL TAKE AS DIRECTED
Qty: 12 TABLET | Refills: 0 | Status: SHIPPED | OUTPATIENT
Start: 2022-11-10

## 2022-11-10 RX ORDER — DEXTROSE AND SODIUM CHLORIDE 5; .45 G/100ML; G/100ML
30 INJECTION, SOLUTION INTRAVENOUS CONTINUOUS PRN
Status: CANCELLED | OUTPATIENT
Start: 2022-12-07

## 2022-11-10 NOTE — PROGRESS NOTES
[facility]      Chief Complaint:   Chief Complaint   Patient presents with   • endo follow up        Subjective     HPI:   Patient with history of colon cancer.  Patient had a colonoscopy which showed a poor prep.  Patient states that the Suprep made her nauseous and she had difficulty drinking it and has some episodes of vomiting.  We will try patient on Sutab to see if any improvement in the prep as we need to have a clean prep before clearing her from recurrence of colon cancer.    Plan; outpatient repeat colonoscopy    Past Medical History:   Past Medical History:   Diagnosis Date   • Abdominal pain     lower, hypo gastrium, s/p surgery      • Anomalous atrioventricular excitation    • Arthritis    • C. difficile colitis     Hx   • Colon cancer (HCC)    • Conduction disorder of the heart      Supraventricular      • Depression    • Drug therapy    • Encounter for screening for malignant neoplasm of colon    • Essential hypertension    • Family history of malignant neoplasm of digestive organ    • History of colon polyps    • History of malignant neoplasm of colon     s/p resection in 2009 and chemo   • History of malignant neoplasm of gastrointestinal tract    • History of transfusion    • Hyperaldosteronism (HCC)    • Hyperplastic polyp of large intestine    • Hypokalemia    • Intramural leiomyoma of uterus    • 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     Folate and B12 ordered      • Menstruation disorder     abnormal bleeding from female genital tract      • MRSA infection     Hx   • Screening for malignant neoplasm of breast    • Vitamin D deficiency    • Peter-Parkinson-White (WPW) pattern        Past Surgical History:  Past Surgical History:   Procedure Laterality Date   • CARDIAC ABLATION     • CARDIAC CATHETERIZATION  07/02/2007    No significant coronary artery disease with normal left ventricular function   • COLECTOMY PARTIAL / TOTAL  12/23/2009    Mid sigmoid  obstructing colon cancer   • COLONOSCOPY N/A 06/29/2021    Procedure: COLONOSCOPY;  Surgeon: Jorge A Sheehan MD;  Location: [facility] ENDOSCOPY;  Service: Gastroenterology;  Laterality: N/A;   • COLONOSCOPY N/A 8/17/2022    Procedure: COLONOSCOPY;  Surgeon: Jorge A Sheehan MD;  Location: [facility] ENDOSCOPY;  Service: Gastroenterology;  Laterality: N/A;   • ECG CONVERTED  09/29/2008    Sinus rhythm with short [place]. Minimal voltage criteria for LVH, may be normal variant T wave abnormality, consider inferolateral ischemia. Prolonged QT Abnormal ECG   • ENDOSCOPY AND COLONOSCOPY  06/27/2016    External and internal hemorrhoids. The examination was otherwise normal. No specimens collected   • HERNIA REPAIR     • TUBAL ABDOMINAL LIGATION     • UPPER GASTROINTESTINAL ENDOSCOPY  12/23/2009       Family History:  Family History   Problem Relation Age of Onset   • Depression Other    • Hypertension Other    • Cancer Father         colon   • Colon cancer Father    • Cancer Paternal Grandfather        Social History:   reports that she has never smoked. She has never used smokeless tobacco. She reports that she does not drink alcohol and does not use drugs.    Medications:   Prior to Admission medications    Medication Sig Start Date End Date Taking? Authorizing Provider   amitriptyline (ELAVIL) 50 MG tablet Take 1 tablet by mouth Every Night. 3/11/22  Yes George Ayala MD   amLODIPine (NORVASC) 10 MG tablet Take 1 tablet by mouth Daily. 4/11/22  Yes George Ayala MD   lisinopril (PRINIVIL,ZESTRIL) 40 MG tablet Take 1 tablet by mouth Daily. 7/11/22  Yes George Ayala MD   metoprolol tartrate (LOPRESSOR) 50 MG tablet Take 1 tablet by mouth 2 (Two) Times a Day. 3/11/22  Yes George Ayala MD   potassium chloride (KLOR-CON) 20 MEQ CR tablet Take 1 tablet by mouth 2 (Two) Times a Day. 6/13/22  Yes George Ayala MD   spironolactone (ALDACTONE) 25 MG tablet Take 1 tablet by mouth Daily. 7/11/22  Yes George Ayala MD   Vitamin D,  "Cholecalciferol, (CHOLECALCIFEROL) 10 MCG (400 UNIT) tablet Take 400 Units by mouth Daily.   Yes Provider, MD Seth   Sodium Sulfate-Mag Sulfate-KCl (Sutab) 9663-693-327 MG tablet Take 1 tablet by mouth Take As Directed. 11/10/22   Jorge A Sheehan MD       Allergies:  Tums [calcium carbonate antacid] and Sulfa antibiotics    ROS:    Review of Systems   Constitutional: Negative for activity change, appetite change, chills, diaphoresis, fatigue, fever and unexpected weight change.   HENT: Negative for sore throat and trouble swallowing.    Respiratory: Negative for shortness of breath.    Gastrointestinal: Negative for abdominal distention, abdominal pain, anal bleeding, blood in stool, constipation, diarrhea, nausea, rectal pain and vomiting.   Endocrine: Negative for polydipsia, polyphagia and polyuria.   Genitourinary: Negative for difficulty urinating.   Musculoskeletal: Negative for arthralgias.   Skin: Negative for pallor.   Allergic/Immunologic: Negative for food allergies.   Neurological: Negative for weakness and light-headedness.   Psychiatric/Behavioral: Negative for behavioral problems.     Objective     Blood pressure 141/95, pulse 70, height 160 cm (63\"), weight 82.5 kg (181 lb 12.8 oz), last menstrual period 09/08/2017, not currently breastfeeding.    Physical Exam  Constitutional:       General: She is not in acute distress.     Appearance: She is well-developed. She is not diaphoretic.   HENT:      Head: Normocephalic and atraumatic.   Cardiovascular:      Rate and Rhythm: Normal rate and regular rhythm.      Heart sounds: Normal heart sounds. No murmur heard.    No friction rub. No gallop.   Pulmonary:      Effort: No respiratory distress.      Breath sounds: Normal breath sounds. No wheezing or rales.   Chest:      Chest wall: No tenderness.   Abdominal:      General: Bowel sounds are normal. There is no distension.      Palpations: Abdomen is soft. There is no mass.      Tenderness: There is " no abdominal tenderness. There is no guarding or rebound.      Hernia: No hernia is present.   Musculoskeletal:         General: Normal range of motion.   Skin:     General: Skin is warm and dry.      Coloration: Skin is not pale.      Findings: No erythema or rash.   Neurological:      Mental Status: She is alert and oriented to person, place, and time.   Psychiatric:         Behavior: Behavior normal.         Thought Content: Thought content normal.         Judgment: Judgment normal.          Assessment & Plan   Diagnoses and all orders for this visit:    1. Encounter for screening for malignant neoplasm of colon (Primary)  -     Case Request; Standing  -     dextrose 5 % and sodium chloride 0.45 % infusion  -     Case Request    2. Personal history of colon cancer  -     Case Request; Standing  -     dextrose 5 % and sodium chloride 0.45 % infusion  -     Case Request    Other orders  -     Sodium Sulfate-Mag Sulfate-KCl (Sutab) 7380-110-637 MG tablet; Take 1 tablet by mouth Take As Directed.  Dispense: 12 tablet; Refill: 0  -     Follow Anesthesia Guidelines / Protocol; Future  -     Obtain Informed Consent; Future  -     Implement Anesthesia Orders Day of Procedure; Standing  -     Obtain Informed Consent; Standing  -     POC Glucose Once; Standing  -     Insert Peripheral IV; Standing        COLONOSCOPY (N/A)     Diagnosis Plan   1. Encounter for screening for malignant neoplasm of colon  Case Request    dextrose 5 % and sodium chloride 0.45 % infusion    Case Request      2. Personal history of colon cancer  Case Request    dextrose 5 % and sodium chloride 0.45 % infusion    Case Request          Anticipated Surgical Procedure:  Orders Placed This Encounter   Procedures   • Obtain Informed Consent     Standing Status:   Future     Order Specific Question:   Informed Consent Given For     Answer:   colonoscopy       The risks, benefits, and alternatives of this procedure have been discussed with the patient or  the responsible party- the patient understands and agrees to proceed.

## 2022-11-10 NOTE — THERAPY TREATMENT NOTE
Outpatient Physical Therapy Ortho Treatment Note  [facility]     Patient Name: Pati Sotelo  : 1965  MRN: [id number]  Today's Date: 11/10/2022      Visit Date: 11/10/2022  Subjective Improvement: 50%  Attendance:   (/yr)  Next MD Visit : 22  Recert Date:  11/15/22        Therapy Diagnosis:  R Hamstring Pain     Visit Dx:    ICD-10-CM ICD-9-CM   1. Right leg pain  M79.604 729.5   2. Acute pain of right thigh  M79.651 729.5       Patient Active Problem List   Diagnosis   • Peter-Parkinson-White (WPW) pattern   • Essential hypertension   • Depression   • Class 1 obesity due to excess calories with serious comorbidity and body mass index (BMI) of 31.0 to 31.9 in adult   • Personal history of colon cancer   • Bilateral primary osteoarthritis of knee   • Internal derangement of left knee   • Internal derangement of right knee   • Chronic pain of both knees   • Encounter for screening for malignant neoplasm of colon        Past Medical History:   Diagnosis Date   • Abdominal pain     lower, hypo gastrium, s/p surgery      • Anomalous atrioventricular excitation    • Arthritis    • C. difficile colitis     Hx   • Colon cancer (HCC)    • Conduction disorder of the heart      Supraventricular      • Depression    • Drug therapy    • Encounter for screening for malignant neoplasm of colon    • Essential hypertension    • Family history of malignant neoplasm of digestive organ    • History of colon polyps    • History of malignant neoplasm of colon     s/p resection in 2009 and chemo   • History of malignant neoplasm of gastrointestinal tract    • History of transfusion    • Hyperaldosteronism (HCC)    • Hyperplastic polyp of large intestine    • Hypokalemia    • Intramural leiomyoma of uterus    • 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     Folate and B12 ordered      • Menstruation disorder     abnormal bleeding from female genital tract      • MRSA infection     Hx   • Screening for malignant  neoplasm of breast    • Vitamin D deficiency    • Peter-Parkinson-White (WPW) pattern         Past Surgical History:   Procedure Laterality Date   • CARDIAC ABLATION     • CARDIAC CATHETERIZATION  07/02/2007    No significant coronary artery disease with normal left ventricular function   • COLECTOMY PARTIAL / TOTAL  12/23/2009    Mid sigmoid obstructing colon cancer   • COLONOSCOPY N/A 06/29/2021    Procedure: COLONOSCOPY;  Surgeon: Jorge A Sheehan MD;  Location: [facility] ENDOSCOPY;  Service: Gastroenterology;  Laterality: N/A;   • COLONOSCOPY N/A 8/17/2022    Procedure: COLONOSCOPY;  Surgeon: Jorge A Sheehan MD;  Location: [facility] ENDOSCOPY;  Service: Gastroenterology;  Laterality: N/A;   • ECG CONVERTED  09/29/2008    Sinus rhythm with short HI. Minimal voltage criteria for LVH, may be normal variant T wave abnormality, consider inferolateral ischemia. Prolonged QT Abnormal ECG   • ENDOSCOPY AND COLONOSCOPY  06/27/2016    External and internal hemorrhoids. The examination was otherwise normal. No specimens collected   • HERNIA REPAIR     • TUBAL ABDOMINAL LIGATION     • UPPER GASTROINTESTINAL ENDOSCOPY  12/23/2009        PT Ortho     Row Name 11/10/22 6056       Subjective Comments    Subjective Comments 75% improvement in hamstrings region pain, improved overall with B knee pain.  -BS       Precautions and Contraindications    Precautions h/o colon cancer (2009)  -BS       Subjective Pain    Able to rate subjective pain? yes  -BS    Pre-Treatment Pain Level 4  4/10 R knee, 2/10 L knee  -BS    Post-Treatment Pain Level 1  -BS       MMT (Manual Muscle Testing)    General MMT Comments R LE-hip flex 5/5 abd 4/5 add 4/5 ext 4/5 knee flex 5/5 knee ext 5/5 L LE-hip flex 5/5 hip abd 4/5 hip add 4/5 hip ext 4/5 knee flex/ext 5/5 (both)  -BS       Lower Extremity Flexibility    Hamstrings Bilateral:;Mildly limited  R 62° (66° manual S); L 46° (63° post manual S)  -BS          User Key  (r) = Recorded By, (t) = Taken By,  (c) = Cosigned By    Initials Name Provider Type    Mike Bullock, PT Physical Therapist                             PT Assessment/Plan     Row Name 11/10/22 4704          PT Assessment    Functional Limitations Impaired gait;Limitation in home management;Performance in work activities;Performance in sport activities;Performance in self-care ADL;Performance in leisure activities  -BS     Impairments Balance;Endurance;Gait;Impaired flexibility;Muscle strength;Pain  -BS     Assessment Comments Presents with improved B hamstrings flexibility with manual stretching w/ clinician. Reduced B knee pain post tx. Limited by B hip weakness. Ataxic gait pattern. Discussed neurologist consult.  -BS     Patient/caregiver participated in establishment of treatment plan and goals Yes  -BS     Patient would benefit from skilled therapy intervention Yes  -BS        PT Plan    PT Frequency 1x/week;2x/week  -BS     Predicted Duration of Therapy Intervention (PT) 3 weeks then TBD  -BS     PT Plan Comments advance hip strengthening, re-check hamstrings length.  -BS           User Key  (r) = Recorded By, (t) = Taken By, (c) = Cosigned By    Initials Name Provider Type    Mike Bullock, PT Physical Therapist                   OP Exercises     Row Name 11/10/22 4205             Subjective Comments    Subjective Comments 75% improvement in hamstrings region pain, improved overall with B knee pain.  -BS         Subjective Pain    Able to rate subjective pain? yes  -BS      Pre-Treatment Pain Level 4  4/10 R knee, 2/10 L knee  -BS      Post-Treatment Pain Level 1  -BS         Exercise 1    Exercise Name 1 pro ll LE strength, L2  -BS      Time 1 10 mins  -BS         Exercise 2    Exercise Name 2 SLR x 3 (hip abd/add, hip ext)  -BS      Sets 2 1  -BS      Reps 2 10  -BS         Exercise 3    Exercise Name 3 manual HS S w/ clinician  -BS      Time 3 6 mins  -BS      Additional Comments plus contract/relax PNF to facilitate increased B  knee ext ROM  -BS         Exercise 4    Exercise Name 4 sit to stand, B UE A  -BS      Sets 4 1  -BS      Reps 4 10  -BS            User Key  (r) = Recorded By, (t) = Taken By, (c) = Cosigned By    Initials Name Provider Type    Mike Bullock, PT Physical Therapist                              PT OP Goals     Row Name 11/10/22 1348          PT Short Term Goals    STG Date to Achieve 11/08/22  -BS     STG 1 Improve R hip flex and R knee flex/ext MMT to 5/5  -BS     STG 1 Progress Ongoing  -BS     STG 2 Improve R hamstrings flexibility 5-10° with 90/90 SLR test position  -BS     STG 2 Progress Ongoing  -BS     STG 3 Reduce R knee pain by 25-50% with standing and walking  -BS     STG 3 Progress Ongoing  -BS     STG 4 mild to no antalgic gait pattern with R LE with use of least AD x 135' in the clinic  -BS     STG 4 Progress Ongoing  -BS        Time Calculation    PT Goal Re-Cert Due Date 11/15/22  -BS           User Key  (r) = Recorded By, (t) = Taken By, (c) = Cosigned By    Initials Name Provider Type    Mike Bullock, PT Physical Therapist                               Time Calculation:   Start Time: 1348  Stop Time: 1440  Time Calculation (min): 52 min  Total Timed Code Minutes- PT: 52 minute(s)  Therapy Charges for Today     Code Description Service Date Service Provider Modifiers Qty    07710375478 HC PT THER PROC EA 15 MIN 11/10/2022 Mike Christensen, PT GP 3                    Mike Christensen PT  11/10/2022

## 2022-11-11 ENCOUNTER — OFFICE VISIT (OUTPATIENT)
Dept: FAMILY MEDICINE CLINIC | Facility: CLINIC | Age: 57
End: 2022-11-11

## 2022-11-11 VITALS
WEIGHT: 181.1 LBS | BODY MASS INDEX: 32.09 KG/M2 | DIASTOLIC BLOOD PRESSURE: 90 MMHG | HEIGHT: 63 IN | SYSTOLIC BLOOD PRESSURE: 132 MMHG

## 2022-11-11 DIAGNOSIS — M23.92 INTERNAL DERANGEMENT OF LEFT KNEE: Chronic | ICD-10-CM

## 2022-11-11 DIAGNOSIS — Z02.79 ISSUE OF MEDICAL CERTIFICATE: ICD-10-CM

## 2022-11-11 DIAGNOSIS — R26.2 DIFFICULTY WALKING: Primary | ICD-10-CM

## 2022-11-11 DIAGNOSIS — M23.91 INTERNAL DERANGEMENT OF RIGHT KNEE: Chronic | ICD-10-CM

## 2022-11-11 DIAGNOSIS — Z02.9 ADMINISTRATIVE ENCOUNTER: ICD-10-CM

## 2022-11-11 DIAGNOSIS — Z12.31 SCREENING MAMMOGRAM FOR BREAST CANCER: ICD-10-CM

## 2022-11-11 DIAGNOSIS — E66.09 CLASS 1 OBESITY DUE TO EXCESS CALORIES WITH SERIOUS COMORBIDITY AND BODY MASS INDEX (BMI) OF 32.0 TO 32.9 IN ADULT: Chronic | ICD-10-CM

## 2022-11-11 DIAGNOSIS — I10 ESSENTIAL HYPERTENSION: Chronic | ICD-10-CM

## 2022-11-11 DIAGNOSIS — Z13.220 SCREENING CHOLESTEROL LEVEL: ICD-10-CM

## 2022-11-11 PROBLEM — Z12.11 ENCOUNTER FOR SCREENING FOR MALIGNANT NEOPLASM OF COLON: Status: RESOLVED | Noted: 2022-11-10 | Resolved: 2022-11-11

## 2022-11-11 PROCEDURE — 99214 OFFICE O/P EST MOD 30 MIN: CPT | Performed by: FAMILY MEDICINE

## 2022-11-11 NOTE — PROGRESS NOTES
Subjective   Pati Sotelo is a 57 y.o. female.  Reevaluation difficulty walking bilateral knee internal derangement also time to follow-up on hypertension obesity diagnoses below.  Interim is now at least up and walking using a cane instead of in a wheelchair.  Doppler ultrasounds were negative.  Has started physical therapy within the last few days.  We have counseled on the need on contacting her orthopedist in regards to her knees.  She also has appointment to see neurologist for her possible neuromuscular component next week.  We have counseled on the need to keep both appointments and keep close follow-up.  We filled out a handicap sticker today.  She also has short-term disability form to fill out.  Will not be able to work as her job as a  till this is situated.  We have counseled on same.  Chart reviewed    History of Present Illness   HPI    The following portions of the patient's history were reviewed and updated as appropriate: allergies, current medications, past family history, past medical history, past social history, past surgical history and problem list.    Review of Systems  Review of Systems   Constitutional: Negative for activity change, appetite change, fatigue and unexpected weight change.   HENT: Negative for trouble swallowing and voice change.    Eyes: Negative for redness and visual disturbance.   Respiratory: Negative for cough and wheezing.    Cardiovascular: Negative for chest pain and palpitations.   Gastrointestinal: Negative for abdominal pain, constipation, diarrhea, nausea and vomiting.   Genitourinary: Negative for urgency.   Musculoskeletal: Positive for arthralgias and gait problem. Negative for joint swelling.   Neurological: Negative for syncope and headaches.   Hematological: Negative for adenopathy.   Psychiatric/Behavioral: Negative for sleep disturbance.       Objective   Physical Exam  Physical Exam  Constitutional:       Appearance: She is well-developed.  "  HENT:      Head: Normocephalic.   Eyes:      Pupils: Pupils are equal, round, and reactive to light.   Neck:      Thyroid: No thyromegaly.   Cardiovascular:      Rate and Rhythm: Normal rate and regular rhythm.      Heart sounds: Normal heart sounds. No murmur heard.    No friction rub. No gallop.   Pulmonary:      Breath sounds: Normal breath sounds.   Abdominal:      General: There is no distension.      Palpations: Abdomen is soft. There is no mass.      Tenderness: There is no abdominal tenderness.   Musculoskeletal:         General: Normal range of motion.      Cervical back: Normal range of motion.      Comments: Get up and go 5 seconds with use of a cane slightly stiffened wide-based gait.  But able to ambulate today.  No edema   Skin:     General: Skin is warm and dry.   Neurological:      Mental Status: She is alert and oriented to person, place, and time.      Deep Tendon Reflexes: Reflexes are normal and symmetric.   Psychiatric:         Mood and Affect: Affect is blunt.         Speech: Speech is delayed.         Behavior: Behavior is cooperative.           Visit Vitals  /90   Ht 160 cm (63\")   Wt 82.1 kg (181 lb 1.6 oz)   LMP 09/08/2017   BMI 32.08 kg/m²     Body mass index is 32.08 kg/m².  /90   Ht 160 cm (63\")   Wt 82.1 kg (181 lb 1.6 oz)   LMP 09/08/2017   BMI 32.08 kg/m²       Assessment/Plan   Diagnoses and all orders for this visit:    1. Difficulty walking (Primary)    2. Internal derangement of right knee    3. Internal derangement of left knee    4. Essential hypertension    5. Class 1 obesity due to excess calories with serious comorbidity and body mass index (BMI) of 32.0 to 32.9 in adult    6. Screening cholesterol level  -     Lipid Panel With LDL / HDL Ratio; Future    7. Administrative encounter    8. Issue of medical certificate    9. Screening mammogram for breast cancer  -     Mammo Screening Digital Tomosynthesis Bilateral With CAD; Future    Continue current " medication.  Counseled follow-up as above.  Obesity.  Counseled on fall prevention.  Refill medicines as needed form filled out recheck 4 weeks.  Off work till then.

## 2022-11-17 ENCOUNTER — HOSPITAL ENCOUNTER (OUTPATIENT)
Dept: PHYSICAL THERAPY | Facility: HOSPITAL | Age: 57
Setting detail: THERAPIES SERIES
Discharge: HOME | End: 2022-11-17

## 2022-11-17 DIAGNOSIS — M79.604 RIGHT LEG PAIN: Primary | ICD-10-CM

## 2022-11-17 DIAGNOSIS — M79.651 ACUTE PAIN OF RIGHT THIGH: ICD-10-CM

## 2022-11-17 DIAGNOSIS — R26.2 DIFFICULTY WALKING: ICD-10-CM

## 2022-11-17 PROCEDURE — 97110 THERAPEUTIC EXERCISES: CPT

## 2022-11-17 NOTE — THERAPY TREATMENT NOTE
Outpatient Physical Therapy Ortho Treatment Note  [facility]     Patient Name: Pati Sotelo  : 1965  MRN: [id number]  Today's Date: 2022      Visit Date: 2022     ATTENDANCE:   SUBJECTIVE IMPROVEMENT: 75%  NEXT MD APPOINTMENT: [facility]  RECERT DATE: 11/15/22    THERAPY DIAGNOSIS: R Hamstring pain       Visit Dx:    ICD-10-CM ICD-9-CM   1. Right leg pain  M79.604 729.5   2. Acute pain of right thigh  M79.651 729.5   3. Difficulty walking  R26.2 719.7       Patient Active Problem List   Diagnosis   • Peter-Parkinson-White (WPW) pattern   • Essential hypertension   • Depression   • Class 1 obesity due to excess calories with serious comorbidity and body mass index (BMI) of 32.0 to 32.9 in adult   • Personal history of colon cancer   • Bilateral primary osteoarthritis of knee   • Internal derangement of left knee   • Internal derangement of right knee   • Chronic pain of both knees        Past Medical History:   Diagnosis Date   • Abdominal pain     lower, hypo gastrium, s/p surgery      • Anomalous atrioventricular excitation    • Arthritis    • C. difficile colitis     Hx   • Colon cancer (HCC)    • Conduction disorder of the heart      Supraventricular      • Depression    • Drug therapy    • Encounter for screening for malignant neoplasm of colon    • Essential hypertension    • Family history of malignant neoplasm of digestive organ    • History of colon polyps    • History of malignant neoplasm of colon     s/p resection in  and chemo   • History of malignant neoplasm of gastrointestinal tract    • History of transfusion    • Hyperaldosteronism (HCC)    • Hyperplastic polyp of large intestine    • Hypokalemia    • Intramural leiomyoma of uterus    • 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     Folate and B12 ordered      • Menstruation disorder     abnormal bleeding from female genital tract      • MRSA infection     Hx   • Screening for malignant neoplasm of breast    • Vitamin D  deficiency    • Peter-Parkinson-White (WPW) pattern         Past Surgical History:   Procedure Laterality Date   • CARDIAC ABLATION     • CARDIAC CATHETERIZATION  07/02/2007    No significant coronary artery disease with normal left ventricular function   • COLECTOMY PARTIAL / TOTAL  12/23/2009    Mid sigmoid obstructing colon cancer   • COLONOSCOPY N/A 06/29/2021    Procedure: COLONOSCOPY;  Surgeon: Jorge A Sheehan MD;  Location: [facility] ENDOSCOPY;  Service: Gastroenterology;  Laterality: N/A;   • COLONOSCOPY N/A 8/17/2022    Procedure: COLONOSCOPY;  Surgeon: Jorge A Sheehan MD;  Location: [facility] ENDOSCOPY;  Service: Gastroenterology;  Laterality: N/A;   • ECG CONVERTED  09/29/2008    Sinus rhythm with short [place]. Minimal voltage criteria for LVH, may be normal variant T wave abnormality, consider inferolateral ischemia. Prolonged QT Abnormal ECG   • ENDOSCOPY AND COLONOSCOPY  06/27/2016    External and internal hemorrhoids. The examination was otherwise normal. No specimens collected   • HERNIA REPAIR     • TUBAL ABDOMINAL LIGATION     • UPPER GASTROINTESTINAL ENDOSCOPY  12/23/2009                        PT Assessment/Plan     Row Name 11/17/22 1700          PT Assessment    Functional Limitations Impaired gait;Limitation in home management;Performance in work activities;Performance in sport activities;Performance in self-care ADL;Performance in leisure activities  -AC     Impairments Balance;Endurance;Gait;Impaired flexibility;Muscle strength;Pain  -AC     Assessment Comments treatment tolerated well todya with focus on LE strengthening. no c/o pain throughout session. continued B knee hyperextension in standing due to poor quad control.  -AC     Rehab Potential Fair  -AC     Patient/caregiver participated in establishment of treatment plan and goals Yes  -AC     Patient would benefit from skilled therapy intervention Yes  -AC        PT Plan    PT Frequency 1x/week;2x/week  -AC     Predicted Duration of Therapy  Intervention (PT) 3 weeks then TBD  -AC     PT Plan Comments possible cybex equipment next session  -AC           User Key  (r) = Recorded By, (t) = Taken By, (c) = Cosigned By    Initials Name Provider Type    AC Kathy Mariscal, PT Physical Therapist                   OP Exercises     Row Name 11/17/22 1700 11/17/22 1600          Subjective Comments    Subjective Comments -- pt notes doing better today, still having some soreness in the back of her leg however overall is improving.  -AC        Subjective Pain    Able to rate subjective pain? -- yes  -AC     Pre-Treatment Pain Level -- 2  -AC        Total Minutes    12541 - PT Therapeutic Exercise Minutes 40  -AC --        Exercise 1    Exercise Name 1 -- Pro II- L4  -AC     Time 1 -- 10 min  -AC        Exercise 2    Exercise Name 2 -- standing HS stretch  -AC     Sets 2 -- 3  -AC     Time 2 -- 30s  -AC        Exercise 3    Exercise Name 3 -- incline stretch  -AC     Sets 3 -- 3  -AC     Time 3 -- 30s  -AC        Exercise 4    Exercise Name 4 -- SLR  -AC     Sets 4 -- 2  -AC     Reps 4 -- 10  -AC     Additional Comments -- 2# AW  -AC        Exercise 5    Exercise Name 5 -- SL hip abd/add  -AC     Sets 5 -- 2 each  -AC     Reps 5 -- 10  -AC     Additional Comments -- 2# AW  -AC        Exercise 6    Exercise Name 6 -- prone hip ext  -AC     Sets 6 -- 2  -AC     Reps 6 -- 10  -AC     Additional Comments -- 2# AW  -AC        Exercise 7    Exercise Name 7 -- prone HS curls  -AC     Sets 7 -- 2  -AC     Reps 7 -- 10  -AC     Additional Comments -- 2# AW  -AC        Exercise 8    Exercise Name 8 -- seated LAQ  -AC     Sets 8 -- 1  -AC     Reps 8 -- 15  -AC     Time 8 -- 5s hold  -AC     Additional Comments -- 2# AW  -AC        Exercise 9    Exercise Name 9 -- step ups- fdw/lateral on 6 inch step  -AC     Reps 9 -- 20 each  -AC        Exercise 10    Exercise Name 10 -- step down on 4 inch step  -AC     Sets 10 -- 20  -AC        Exercise 11    Exercise Name 11 -- airex  marching  -AC     Sets 11 -- 2  -AC     Reps 11 -- 10  -AC     Additional Comments -- BUE for balance  -        Exercise 12    Exercise Name 12 -- airex HR/TR  -AC     Reps 12 -- 2  -AC     Time 12 -- 10  -AC        Exercise 13    Exercise Name 13 -- minisquats  -AC     Sets 13 -- 1  -AC     Reps 13 -- 20  -AC        Exercise 14    Exercise Name 14 -- pt notes will ice at home if needed  -           User Key  (r) = Recorded By, (t) = Taken By, (c) = Cosigned By    Initials Name Provider Type    AC Kathy Mariscal, PT Physical Therapist                              PT OP Goals     Row Name 11/17/22 1700          PT Short Term Goals    STG Date to Achieve 11/08/22  -     STG 1 Improve R hip flex and R knee flex/ext MMT to 5/5  -     STG 1 Progress Ongoing  -     STG 2 Improve R hamstrings flexibility 5-10° with 90/90 SLR test position  -     STG 2 Progress Ongoing  -     STG 3 Reduce R knee pain by 25-50% with standing and walking  -     STG 3 Progress Ongoing  -     STG 4 mild to no antalgic gait pattern with R LE with use of least AD x 135' in the clinic  -     STG 4 Progress Ongoing  -        Time Calculation    PT Goal Re-Cert Due Date 11/15/22  -           User Key  (r) = Recorded By, (t) = Taken By, (c) = Cosigned By    Initials Name Provider Type    AC Kathy Mariscal, PT Physical Therapist                               Time Calculation:   Start Time: 1646  Stop Time: 1726  Time Calculation (min): 40 min  Timed Charges  17614 - PT Therapeutic Exercise Minutes: 40  Total Minutes  Timed Charges Total Minutes: 40   Total Minutes: 40  Therapy Charges for Today     Code Description Service Date Service Provider Modifiers Qty    40423874206 HC PT THER PROC EA 15 MIN 11/17/2022 Kathy Mariscal, PT GP 3                    Kathy Mariscal, PT  11/17/2022

## 2022-11-22 ENCOUNTER — HOSPITAL ENCOUNTER (OUTPATIENT)
Dept: PHYSICAL THERAPY | Facility: HOSPITAL | Age: 57
Setting detail: THERAPIES SERIES
Discharge: HOME | End: 2022-11-22

## 2022-11-22 DIAGNOSIS — M79.651 ACUTE PAIN OF RIGHT THIGH: ICD-10-CM

## 2022-11-22 DIAGNOSIS — R26.2 DIFFICULTY WALKING: ICD-10-CM

## 2022-11-22 DIAGNOSIS — M79.604 RIGHT LEG PAIN: Primary | ICD-10-CM

## 2022-11-22 PROCEDURE — 97110 THERAPEUTIC EXERCISES: CPT | Performed by: PHYSICAL THERAPIST

## 2022-11-22 NOTE — THERAPY PROGRESS REPORT/RE-CERT
Outpatient Physical Therapy Ortho Progress Note  [facility]     Patient Name: Pati Sotelo  : 1965  MRN: [id number]  Today's Date: 2022      Visit Date: 2022  ATTENDANCE:   SUBJECTIVE IMPROVEMENT: 75%  NEXT MD APPOINTMENT: [facility]  RECERT DATE: 22     THERAPY DIAGNOSIS: R Hamstring pain    Visit Dx:    ICD-10-CM ICD-9-CM   1. Right leg pain  M79.604 729.5   2. Acute pain of right thigh  M79.651 729.5   3. Difficulty walking  R26.2 719.7       Patient Active Problem List   Diagnosis   • Peter-Parkinson-White (WPW) pattern   • Essential hypertension   • Depression   • Class 1 obesity due to excess calories with serious comorbidity and body mass index (BMI) of 32.0 to 32.9 in adult   • Personal history of colon cancer   • Bilateral primary osteoarthritis of knee   • Internal derangement of left knee   • Internal derangement of right knee   • Chronic pain of both knees        Past Medical History:   Diagnosis Date   • Abdominal pain     lower, hypo gastrium, s/p surgery      • Anomalous atrioventricular excitation    • Arthritis    • C. difficile colitis     Hx   • Colon cancer (HCC)    • Conduction disorder of the heart      Supraventricular      • Depression    • Drug therapy    • Encounter for screening for malignant neoplasm of colon    • Essential hypertension    • Family history of malignant neoplasm of digestive organ    • History of colon polyps    • History of malignant neoplasm of colon     s/p resection in  and chemo   • History of malignant neoplasm of gastrointestinal tract    • History of transfusion    • Hyperaldosteronism (HCC)    • Hyperplastic polyp of large intestine    • Hypokalemia    • Intramural leiomyoma of uterus    • 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     Folate and B12 ordered      • Menstruation disorder     abnormal bleeding from female genital tract      • MRSA infection     Hx   • Screening for malignant neoplasm of breast    • Vitamin D deficiency     • Peter-Parkinson-White (WPW) pattern         Past Surgical History:   Procedure Laterality Date   • CARDIAC ABLATION     • CARDIAC CATHETERIZATION  07/02/2007    No significant coronary artery disease with normal left ventricular function   • COLECTOMY PARTIAL / TOTAL  12/23/2009    Mid sigmoid obstructing colon cancer   • COLONOSCOPY N/A 06/29/2021    Procedure: COLONOSCOPY;  Surgeon: Jorge A Sheehan MD;  Location: [facility] ENDOSCOPY;  Service: Gastroenterology;  Laterality: N/A;   • COLONOSCOPY N/A 8/17/2022    Procedure: COLONOSCOPY;  Surgeon: Jorge A Sheehan MD;  Location: [facility] ENDOSCOPY;  Service: Gastroenterology;  Laterality: N/A;   • ECG CONVERTED  09/29/2008    Sinus rhythm with short [place]. Minimal voltage criteria for LVH, may be normal variant T wave abnormality, consider inferolateral ischemia. Prolonged QT Abnormal ECG   • ENDOSCOPY AND COLONOSCOPY  06/27/2016    External and internal hemorrhoids. The examination was otherwise normal. No specimens collected   • HERNIA REPAIR     • TUBAL ABDOMINAL LIGATION     • UPPER GASTROINTESTINAL ENDOSCOPY  12/23/2009        PT Ortho     Row Name 11/22/22 5147       Subjective Comments    Subjective Comments Pain increases as the day goes on most days, intermittently using cane.  -BS       Precautions and Contraindications    Precautions h/o colon cancer (2009)  -BS       Subjective Pain    Able to rate subjective pain? yes  -BS    Pre-Treatment Pain Level 3  -BS       General ROM    GENERAL ROM COMMENTS AROM: L knee 0-136° R knee  -BS       MMT (Manual Muscle Testing)    General MMT Comments R LE-hip flex 4/5 hip abd 5/5 hip add 5/5 knee flex 4+/5 knee ext 4+/5  -BS          User Key  (r) = Recorded By, (t) = Taken By, (c) = Cosigned By    Initials Name Provider Type    Mike Bullock, PT Physical Therapist                             PT Assessment/Plan     Row Name 11/22/22 8116          PT Assessment    Functional Limitations Impaired gait;Limitation in  home management;Performance in work activities;Performance in sport activities;Performance in self-care ADL;Performance in leisure activities  -BS     Impairments Balance;Endurance;Gait;Impaired flexibility;Muscle strength;Pain  -BS     Assessment Comments Progressing toward goals, improved L knee ROM, improved L hip/knee strength as well. Still with quite altered and antalgic gait mechanics without use of AD.  -BS     Patient/caregiver participated in establishment of treatment plan and goals Yes  -BS     Patient would benefit from skilled therapy intervention Yes  -BS        PT Plan    PT Frequency 1x/week;2x/week  -BS     Predicted Duration of Therapy Intervention (PT) additional 2-3 weeks  -BS     PT Plan Comments Re-check hamstrings length. Progress knee stability. Patient to purchase a knee brace for use as needed on uneven terrain.  -BS           User Key  (r) = Recorded By, (t) = Taken By, (c) = Cosigned By    Initials Name Provider Type    BS Mike Christensen, PT Physical Therapist                   OP Exercises     Row Name 11/22/22 7961             Subjective Comments    Subjective Comments Pain increases as the day goes on most days, intermittently using cane.  -BS         Subjective Pain    Able to rate subjective pain? yes  -BS      Pre-Treatment Pain Level 3  -BS      Post-Treatment Pain Level 3  -BS         Exercise 1    Exercise Name 1 Pro II- L4  -BS      Time 1 10 min  -BS         Exercise 2    Exercise Name 2 MMT/ROM reassessment  -BS      Time 2 2'  -BS         Exercise 3    Exercise Name 3 R SLR  -BS      Sets 3 2  -BS      Reps 3 10  -BS      Additional Comments 3# aw  -BS         Exercise 4    Exercise Name 4 resisted R hip flex in standing w/ green TB  -BS      Sets 4 2  -BS      Reps 4 10  -BS         Exercise 6    Exercise Name 6 prone hip ext  -BS      Sets 6 1  -BS      Reps 6 30  -BS      Additional Comments 3# aw  -BS         Exercise 8    Exercise Name 8 seated LAQ  -BS      Sets 8 2   -BS      Reps 8 20  -BS      Additional Comments 3# aw  -BS            User Key  (r) = Recorded By, (t) = Taken By, (c) = Cosigned By    Initials Name Provider Type    Mike Bullock, PT Physical Therapist                              PT OP Goals     Row Name 11/22/22 1700 11/22/22 1652       PT Short Term Goals    STG Date to Achieve -- 11/08/22  -BS    STG 1 -- Improve R hip flex and R knee flex/ext MMT to 5/5  -BS    STG 1 Progress -- Ongoing;Progressing  -BS    STG 2 -- Improve R hamstrings flexibility 5-10° with 90/90 SLR test position  -BS    STG 2 Progress -- Ongoing  -BS    STG 3 -- Reduce R knee pain by 25-50% with standing and walking  -    STG 3 Progress -- Met  40% improvement  -    STG 4 -- mild to no antalgic gait pattern with R LE with use of least AD x 135' in the clinic  -    STG 4 Progress -- Ongoing  -BS       Time Calculation    PT Goal Re-Cert Due Date --  - 12/13/22  -          User Key  (r) = Recorded By, (t) = Taken By, (c) = Cosigned By    Initials Name Provider Type    Mike Bullock, PT Physical Therapist                               Time Calculation:   Start Time: 1651  Stop Time: 1729  Time Calculation (min): 38 min  Total Timed Code Minutes- PT: 38 minute(s)  Therapy Charges for Today     Code Description Service Date Service Provider Modifiers Qty    13442455641 HC PT THER PROC EA 15 MIN 11/22/2022 Mike Christensen PT GP 3                    Mike Christensen PT  11/22/2022

## 2022-11-29 ENCOUNTER — HOSPITAL ENCOUNTER (OUTPATIENT)
Dept: PHYSICAL THERAPY | Facility: HOSPITAL | Age: 57
Setting detail: THERAPIES SERIES
Discharge: HOME | End: 2022-11-29

## 2022-11-29 DIAGNOSIS — M25.562 BILATERAL CHRONIC KNEE PAIN: ICD-10-CM

## 2022-11-29 DIAGNOSIS — G89.29 BILATERAL CHRONIC KNEE PAIN: ICD-10-CM

## 2022-11-29 DIAGNOSIS — M79.651 ACUTE PAIN OF RIGHT THIGH: ICD-10-CM

## 2022-11-29 DIAGNOSIS — M25.561 BILATERAL CHRONIC KNEE PAIN: ICD-10-CM

## 2022-11-29 DIAGNOSIS — R26.2 DIFFICULTY WALKING: ICD-10-CM

## 2022-11-29 DIAGNOSIS — M79.604 RIGHT LEG PAIN: Primary | ICD-10-CM

## 2022-11-29 PROCEDURE — 97110 THERAPEUTIC EXERCISES: CPT

## 2022-11-29 NOTE — THERAPY TREATMENT NOTE
Outpatient Physical Therapy Ortho Treatment Note  [facility]     Patient Name: Pati Sotelo  : 1965  MRN: [id number]  Today's Date: 2022      Visit Date: 2022     ATTENDANCE:  (89/y)  SUBJECTIVE IMPROVEMENT: 75%  NEXT MD APPOINTMENT: 22 Dr. Ayala; 22 Maribel WALKER   RECERT DATE: 22     THERAPY DIAGNOSIS: R Hamstring pain    Visit Dx:    ICD-10-CM ICD-9-CM   1. Right leg pain  M79.604 729.5   2. Acute pain of right thigh  M79.651 729.5   3. Difficulty walking  R26.2 719.7   4. Bilateral chronic knee pain  M25.561 719.46    M25.562 338.29    G89.29        Patient Active Problem List   Diagnosis   • Peter-Parkinson-White (WPW) pattern   • Essential hypertension   • Depression   • Class 1 obesity due to excess calories with serious comorbidity and body mass index (BMI) of 32.0 to 32.9 in adult   • Personal history of colon cancer   • Bilateral primary osteoarthritis of knee   • Internal derangement of left knee   • Internal derangement of right knee   • Chronic pain of both knees        Past Medical History:   Diagnosis Date   • Abdominal pain     lower, hypo gastrium, s/p surgery      • Anomalous atrioventricular excitation    • Arthritis    • C. difficile colitis     Hx   • Colon cancer (HCC)    • Conduction disorder of the heart      Supraventricular      • Depression    • Drug therapy    • Encounter for screening for malignant neoplasm of colon    • Essential hypertension    • Family history of malignant neoplasm of digestive organ    • History of colon polyps    • History of malignant neoplasm of colon     s/p resection in 2009 and chemo   • History of malignant neoplasm of gastrointestinal tract    • History of transfusion    • Hyperaldosteronism (HCC)    • Hyperplastic polyp of large intestine    • Hypokalemia    • Intramural leiomyoma of uterus    • 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     Folate and B12 ordered      • Menstruation disorder     abnormal bleeding  from female genital tract      • MRSA infection     Hx   • Screening for malignant neoplasm of breast    • Vitamin D deficiency    • Peter-Parkinson-White (WPW) pattern         Past Surgical History:   Procedure Laterality Date   • CARDIAC ABLATION     • CARDIAC CATHETERIZATION  07/02/2007    No significant coronary artery disease with normal left ventricular function   • COLECTOMY PARTIAL / TOTAL  12/23/2009    Mid sigmoid obstructing colon cancer   • COLONOSCOPY N/A 06/29/2021    Procedure: COLONOSCOPY;  Surgeon: Jorge A Sheehan MD;  Location: [facility] ENDOSCOPY;  Service: Gastroenterology;  Laterality: N/A;   • COLONOSCOPY N/A 8/17/2022    Procedure: COLONOSCOPY;  Surgeon: Jorge A Sheehan MD;  Location: [facility] ENDOSCOPY;  Service: Gastroenterology;  Laterality: N/A;   • ECG CONVERTED  09/29/2008    Sinus rhythm with short WI. Minimal voltage criteria for LVH, may be normal variant T wave abnormality, consider inferolateral ischemia. Prolonged QT Abnormal ECG   • ENDOSCOPY AND COLONOSCOPY  06/27/2016    External and internal hemorrhoids. The examination was otherwise normal. No specimens collected   • HERNIA REPAIR     • TUBAL ABDOMINAL LIGATION     • UPPER GASTROINTESTINAL ENDOSCOPY  12/23/2009        PT Ortho     Row Name 11/29/22 1500       Precautions and Contraindications    Precautions h/o colon cancer (2009)  -       Subjective Pain    Post-Treatment Pain Level 3  -       Posture/Observations    Posture/Observations Comments ataxic gait; no use of [facility]  -       Lower Extremity Flexibility    Hamstrings Bilateral:;WNL  L -15°; R -10³ at 90/90 supine  -          User Key  (r) = Recorded By, (t) = Taken By, (c) = Cosigned By    Initials Name Provider Type    Clemencia Rehman PTA Physical Therapist Assistant                             PT Assessment/Plan     Row Name 11/29/22 1500          PT Assessment    Functional Limitations Impaired gait;Limitation in home management;Performance in work  "activities;Performance in sport activities;Performance in self-care ADL;Performance in leisure activities  -     Impairments Balance;Endurance;Gait;Impaired flexibility;Muscle strength;Pain  -     Assessment Comments much improved hamstring lengthening this date; also progressed strengthening without difficulty just felt like she had a work out.  -     Rehab Potential Fair  -     Patient/caregiver participated in establishment of treatment plan and goals Yes  -     Patient would benefit from skilled therapy intervention Yes  -KH        PT Plan    PT Frequency 1x/week;2x/week  -     Predicted Duration of Therapy Intervention (PT) additional 2-3 weeks  -     PT Plan Comments Pt to obtain knee brace for uneven terrain;  -           User Key  (r) = Recorded By, (t) = Taken By, (c) = Cosigned By    Initials Name Provider Type    Clemencia Rehman PTA Physical Therapist Assistant                 Modalities     Row Name 11/29/22 1500             Subjective Pain    Pre-Treatment Pain Level 3  -            User Key  (r) = Recorded By, (t) = Taken By, (c) = Cosigned By    Initials Name Provider Type    Clemencia Rehman PTA Physical Therapist Assistant               OP Exercises     Row Name 11/29/22 1500             Subjective Comments    Subjective Comments Sees Dr. Ayala on 12/09/22; Continues to feel like it is getting some better; Sees Maribel AIKEN for knees on 12/06/22; Also reports that she saw a neurologist last week and they are thinking she has neuropathy in her LEs  -         Subjective Pain    Able to rate subjective pain? yes  -KH      Pre-Treatment Pain Level 3  -KH      Post-Treatment Pain Level 3  -KH         Exercise 1    Exercise Name 1 pro ll LE strength  -      Time 1 10 min  -      Additional Comments level 3; seat 5  -KH         Exercise 2    Exercise Name 2 standing hamstring stretch B  -      Sets 2 3  -KH      Time 2 30\" holds  -         Exercise 3    Exercise Name 3 cybex leg press "  -KH      Sets 3 2  -KH      Reps 3 10  -KH      Additional Comments 50#  -         Exercise 4    Exercise Name 4 cybex hip adduction  -      Sets 4 2  -KH      Reps 4 10  -KH      Additional Comments 10#  -         Exercise 5    Exercise Name 5 cybex hip abduction  -KH      Sets 5 2  -KH      Reps 5 10  -KH      Additional Comments 10#  -         Exercise 6    Exercise Name 6 T-band seated IR & ER B  -KH      Sets 6 2  -KH      Reps 6 10 ea  -KH      Additional Comments Red  -            User Key  (r) = Recorded By, (t) = Taken By, (c) = Cosigned By    Initials Name Provider Type     Cleemncia Baltazar PTA Physical Therapist Assistant                              PT OP Goals     Row Name 11/29/22 1500          PT Short Term Goals    STG Date to Achieve 11/08/22  -     STG 1 Improve R hip flex and R knee flex/ext MMT to 5/5  -     STG 1 Progress Ongoing;Progressing  -     STG 2 Improve R hamstrings flexibility 5-10° with 90/90 SLR test position  -     STG 2 Progress Met;Partially Met   -     STG 3 Reduce R knee pain by 25-50% with standing and walking  -     STG 3 Progress Met  40% improvement  -     STG 4 mild to no antalgic gait pattern with R LE with use of least AD x 135' in the clinic  -     STG 4 Progress Ongoing  -        Time Calculation    PT Goal Re-Cert Due Date 12/13/22  -           User Key  (r) = Recorded By, (t) = Taken By, (c) = Cosigned By    Initials Name Provider Type    Clemencia Rehman PTA Physical Therapist Assistant                               Time Calculation:   Start Time: 1515  Stop Time: 1600  Time Calculation (min): 45 min  Therapy Charges for Today     Code Description Service Date Service Provider Modifiers Qty    [id number]  PT THER PROC EA 15 MIN 11/29/2022 Clemencia Baltazar PTA GP, CQ 3                    Clemencia Baltazar PTA  11/29/2022

## 2022-12-06 ENCOUNTER — HOSPITAL ENCOUNTER (OUTPATIENT)
Dept: PHYSICAL THERAPY | Facility: HOSPITAL | Age: 57
Setting detail: THERAPIES SERIES
Discharge: HOME | End: 2022-12-06

## 2022-12-06 ENCOUNTER — OFFICE VISIT (OUTPATIENT)
Dept: ORTHOPEDIC SURGERY | Facility: CLINIC | Age: 57
End: 2022-12-06

## 2022-12-06 VITALS — BODY MASS INDEX: 32.08 KG/M2 | HEIGHT: 63 IN

## 2022-12-06 DIAGNOSIS — M25.562 BILATERAL CHRONIC KNEE PAIN: ICD-10-CM

## 2022-12-06 DIAGNOSIS — M17.0 BILATERAL PRIMARY OSTEOARTHRITIS OF KNEE: ICD-10-CM

## 2022-12-06 DIAGNOSIS — M23.301 DEGENERATIVE TEAR OF LATERAL MENISCUS OF LEFT KNEE: ICD-10-CM

## 2022-12-06 DIAGNOSIS — M79.651 ACUTE PAIN OF RIGHT THIGH: ICD-10-CM

## 2022-12-06 DIAGNOSIS — R26.2 DIFFICULTY WALKING: ICD-10-CM

## 2022-12-06 DIAGNOSIS — M79.604 RIGHT LEG PAIN: Primary | ICD-10-CM

## 2022-12-06 DIAGNOSIS — M25.561 BILATERAL CHRONIC KNEE PAIN: ICD-10-CM

## 2022-12-06 DIAGNOSIS — G89.29 CHRONIC PAIN OF BOTH KNEES: Primary | ICD-10-CM

## 2022-12-06 DIAGNOSIS — M94.262 CHONDROMALACIA OF LEFT KNEE: ICD-10-CM

## 2022-12-06 DIAGNOSIS — M25.461 EFFUSION OF RIGHT KNEE: ICD-10-CM

## 2022-12-06 DIAGNOSIS — M25.462 EFFUSION OF LEFT KNEE: ICD-10-CM

## 2022-12-06 DIAGNOSIS — M94.261 CHONDROMALACIA OF RIGHT KNEE: ICD-10-CM

## 2022-12-06 DIAGNOSIS — M25.562 CHRONIC PAIN OF BOTH KNEES: Primary | ICD-10-CM

## 2022-12-06 DIAGNOSIS — G89.29 BILATERAL CHRONIC KNEE PAIN: ICD-10-CM

## 2022-12-06 DIAGNOSIS — M25.561 CHRONIC PAIN OF BOTH KNEES: Primary | ICD-10-CM

## 2022-12-06 DIAGNOSIS — M23.300 DEGENERATIVE TEAR OF LATERAL MENISCUS OF RIGHT KNEE: ICD-10-CM

## 2022-12-06 PROCEDURE — 97110 THERAPEUTIC EXERCISES: CPT

## 2022-12-06 PROCEDURE — 20610 DRAIN/INJ JOINT/BURSA W/O US: CPT | Performed by: NURSE PRACTITIONER

## 2022-12-06 PROCEDURE — 99213 OFFICE O/P EST LOW 20 MIN: CPT | Performed by: NURSE PRACTITIONER

## 2022-12-06 RX ADMIN — TRIAMCINOLONE ACETONIDE 40 MG: 40 INJECTION, SUSPENSION INTRA-ARTICULAR; INTRAMUSCULAR at 10:29

## 2022-12-06 RX ADMIN — LIDOCAINE HYDROCHLORIDE 2 ML: 10 INJECTION, SOLUTION INFILTRATION; PERINEURAL at 10:29

## 2022-12-06 NOTE — PROGRESS NOTES
"Pati Sotelo is a 57 y.o. female returns for     Chief Complaint   Patient presents with   • Left Knee - Follow-up, Pain     MRI results   • Right Knee - Follow-up, Pain     MRI results     HISTORY OF PRESENT ILLNESS: Patient presents to office for follow-up of chronic bilateral knee pain and to discuss MRI results of the bilateral knees.  Initial onset of pain occurred several years ago with no known injury or incident.  She has primarily experienced persistent pain in the anterior aspects of both knees.  The patient works as a  at an elementary school and experiences increased pain with her work duties and longer periods of weightbearing activity. The patient previously established care with orthopedics on 2/17/2022.  She has been treated conservatively with previous intra-articular injections of steroid, a concentrated period of time off from work, prescription oral NSAIDs, physical therapy, ice therapy and rest/activity modification.  Patient previously attended 5 visits of physical therapy but had to discontinue PT at that time due to the out-of-pocket cost of $50 co-pays with each visit.  Patient reports that she is currently back in physical therapy at this time due to an acute right hamstring injury/strain.  Patient states her bilateral knee pain has been gradually worsening.  She is requesting repeat injections in both knees today in an effort to improve her pain.  No new complaints or concerns noted regarding her bilateral knee joints.  Current pain scale is 3/10.     CONCURRENT MEDICAL HISTORY:    The following portions of the patient's history were reviewed and updated as appropriate: allergies, current medications, past family history, past medical history, past social history, past surgical history and problem list.     ROS  No fevers or chills.  No chest pain or shortness of air.  No GI or  disturbances.  Bilateral knee pain.    PHYSICAL EXAMINATION:       Ht 160 cm (63\")   LMP " 09/08/2017   BMI 32.08 kg/m²     Physical Exam  Vitals reviewed.   Constitutional:       General: She is not in acute distress.     Appearance: She is well-developed. She is not ill-appearing.   HENT:      Head: Normocephalic.   Pulmonary:      Effort: Pulmonary effort is normal. No respiratory distress.   Abdominal:      General: There is no distension.      Palpations: Abdomen is soft.   Musculoskeletal:         General: Swelling (Mild, bilateral knees) and tenderness (Mild, bilateral knees) present. No deformity or signs of injury.      Right knee: No effusion.      Instability Tests: Medial Amelia test negative and lateral Amelia test negative.      Left knee: No effusion.      Instability Tests: Medial Amelia test negative and lateral Amelia test negative.   Skin:     General: Skin is warm and dry.      Capillary Refill: Capillary refill takes less than 2 seconds.      Findings: No erythema.   Neurological:      Mental Status: She is alert and oriented to person, place, and time.      GCS: GCS eye subscore is 4. GCS verbal subscore is 5. GCS motor subscore is 6.   Psychiatric:         Speech: Speech normal.         Behavior: Behavior normal.         Thought Content: Thought content normal.         Judgment: Judgment normal.         GAIT:     []  Normal  [x]  Antalgic    Assistive device: [x]  None  []  Walker     []  Crutches  []  Cane     []  Wheelchair  []  Stretcher    Right Knee Exam     Tenderness   Right knee tenderness location: Mild, diffuse (anterior)    Range of Motion   Right knee extension: 2.   Flexion: 120     Tests   Amelia:  Medial - negative Lateral - negative  Varus: negative Valgus: negative    Other   Erythema: absent  Sensation: normal  Pulse: present  Swelling: mild  Effusion: no effusion present    Comments:  Bony prominence noted at the tibial tubercle.      Left Knee Exam     Tenderness   Left knee tenderness location: Mild, diffuse (anterior)    Range of Motion   Extension:  0   Flexion: 120     Tests   Amelia:  Medial - negative Lateral - negative  Varus: negative Valgus: negative    Other   Erythema: absent  Sensation: normal  Pulse: present  Swelling: mild  Effusion: no effusion present    Comments:  Bony prominence noted at the tibial tubercle.            EXAM: MRI KNEE RIGHT  WO CONTRAST      CLINICAL INDICATION: Right knee pain     COMPARISON: 2/17/2022     TECHNIQUE: The MRI was done using sagittal T1, PD and T2 fat sat,  axial T2 fat sat and coronal PD and T2 fat sat images.     FINDINGS: There is a moderate knee joint effusion. The ACL and  PCL are intact. The quadriceps and patellar tendons are intact.  The medial and lateral collateral ligaments are intact. The body  segment of the lateral meniscus has an abnormal configuration  consistent with tear with small tear or defect noted along its  undersurface, series 4, image 13. The medial meniscus appears  intact. There is mild thinning and irregularity of articular  cartilage in the lateral compartment. Articular cartilage in the  medial and patellofemoral compartments appears relatively  unremarkable. The visualized osseous structures otherwise have  normal morphology and signal characteristics with no evidence of  bone marrow edema, occult fractures, or marrow-replacing bone  lesions.      IMPRESSION:  1. Lateral meniscal tear.  2. Mild chondromalacia in the lateral compartment.  3. Moderate knee joint effusion.        Electronically signed by:  Galo Ansari MD  9/7/2022 7:47 PM CDT  Workstation: [id number]    EXAM: MRI KNEE LEFT  WO CONTRAST      CLINICAL INDICATION: Left knee pain     COMPARISON: 2/17/2022     TECHNIQUE: The MRI was done using coronal PD and T2 fat sat,  sagittal T1, PD and T2 fat sat and axial T2 fat sat images.     FINDINGS: There is a moderate knee joint effusion. The ACL and  PCL are intact. The quadriceps and patellar tendons are intact.  There is periligamentous edema about the MCL consistent  with  grade 1 sprain. The lateral collateral ligament structures are  unremarkable. The body segment of the lateral meniscus has a  globular, abnormal appearance consistent with tear. The medial  meniscus appears intact without tear. There is mild thinning and  irregularity of articular cartilage in the lateral compartment.  Patellofemoral and medial compartment cartilage is relatively  well preserved. The visualized osseous structures otherwise have  normal morphology and signal characteristics with no evidence of  bone marrow edema, occult fractures, or marrow-replacing bone  lesions.      IMPRESSION:  1. Lateral meniscal tear.  2. Mild chondromalacia in the lateral compartment.  3. Moderate knee joint effusion.        Electronically signed by:  Galo Ansari MD  9/7/2022 7:45 PM CDT  Workstation: [id number]    XR Knee 1 or 2 View Left     Result Date: 2/17/2022  Narrative: EXAM: Bilateral knee series (3 views) CLINICAL INDICATION: Bilateral knee pain COMPARISON: Right knee series 6/18/2021 FINDINGS: There is no acute fracture, dislocation or destructive lesion. There is mild prominence of the bilateral tibial spines and prominent marginal osteophytosis of the lateral compartment on the left. There is no osseous loose body. There is small left suprapatellar effusion. There is no significant soft tissue swelling or obvious subcutaneous gas.      Impression: CONCLUSION: Mild bilateral osteoarthritic changes and small left suprapatellar effusion. Electronically signed by:  Umberto Warner DO  2/17/2022 11:08 AM [facility] Workstation: [id number]     XR Knee Bilateral AP Standing     Result Date: 2/17/2022  Narrative: EXAM: Bilateral knee series (3 views) CLINICAL INDICATION: Bilateral knee pain COMPARISON: Right knee series 6/18/2021 FINDINGS: There is no acute fracture, dislocation or destructive lesion. There is mild prominence of the bilateral tibial spines and prominent marginal osteophytosis of the lateral compartment on  the left. There is no osseous loose body. There is small left suprapatellar effusion. There is no significant soft tissue swelling or obvious subcutaneous gas.      Impression: CONCLUSION: Mild bilateral osteoarthritic changes and small left suprapatellar effusion. Electronically signed by:  Umberto Warner DO  2/17/2022 11:08 AM CST Workstation: [id number]     PROCEDURE: Right knee x-rays with five views     INDICATION: contusion to rt knee, M25.561 Pain in right knee     COMPARISON: None.     FINDINGS:     No fractures or acute osseous abnormalities.     Anterior enthesophytes along the superior and inferior aspect of  the patella.     Lateral joint space narrowing and lateral osteophytes along the  lateral tibial margin.     IMPRESSION:  No acute osseous abnormalities.     Mild-to-moderate degenerative change lateral joint compartment  with joint space narrowing and osteophytes.     [id number]     Electronically signed by:  Johnny Harry MD  6/18/2021 1:18  PM CDT Workstation: [id number]      ASSESSMENT:    Diagnoses and all orders for this visit:    Chronic pain of both knees  -     Large Joint Arthrocentesis: R knee  -     Large Joint Arthrocentesis: L knee    Bilateral primary osteoarthritis of knee  -     Large Joint Arthrocentesis: R knee  -     Large Joint Arthrocentesis: L knee    Effusion of left knee  -     Large Joint Arthrocentesis: L knee    Effusion of right knee  -     Large Joint Arthrocentesis: R knee    Degenerative tear of lateral meniscus of left knee  -     Large Joint Arthrocentesis: L knee    Degenerative tear of lateral meniscus of right knee  -     Large Joint Arthrocentesis: R knee    Chondromalacia of left knee  -     Large Joint Arthrocentesis: L knee    Chondromalacia of right knee  -     Large Joint Arthrocentesis: R knee    PLAN    Large Joint Arthrocentesis: R knee  Date/Time: 12/6/2022 10:29 AM  Consent given by: patient  Timeout: Immediately prior to procedure a time out was called to  verify the correct patient, procedure, equipment, support staff and site/side marked as required   Supporting Documentation  Indications: pain, joint swelling and diagnostic evaluation   Procedure Details  Location: knee - R knee  Preparation: Patient was prepped and draped in the usual sterile fashion  Needle size: 22 G  Approach: anterolateral  Medications administered: 40 mg triamcinolone acetonide 40 MG/ML; 2 mL lidocaine 1 %  Patient tolerance: patient tolerated the procedure well with no immediate complications    Large Joint Arthrocentesis: L knee  Date/Time: 12/6/2022 10:29 AM  Consent given by: patient  Timeout: Immediately prior to procedure a time out was called to verify the correct patient, procedure, equipment, support staff and site/side marked as required   Supporting Documentation  Indications: pain, diagnostic evaluation and joint swelling   Procedure Details  Location: knee - L knee  Preparation: Patient was prepped and draped in the usual sterile fashion  Needle size: 22 G  Approach: anterolateral  Medications administered: 40 mg triamcinolone acetonide 40 MG/ML; 2 mL lidocaine 1 %  Patient tolerance: patient tolerated the procedure well with no immediate complications      MRI of the left knee reviewed and results are discussed with patient today.  We discussed that she has evidence of a moderate joint effusion at the time of the MRI performed on 9/7/2022.  We discussed that she has evidence of edema at the MCL consistent with a grade 1 sprain. There is evidence of some mild thinning and irregularity of the articular cartilage in the lateral compartment consistent with chondromalacia as well as evidence of a lateral meniscus tear.    MRI of the right knee reviewed and results are discussed with patient today.  We discussed that she has evidence of a moderate knee joint effusion at the time of the MRI performed on 9/7/2022.  We discussed that she has mild thinning and irregularity of the  articular cartilage in the lateral compartment consistent with chondromalacia and evidence of a small undersurface tear of the lateral meniscus.    Patient is doing well overall but complains of continued bilateral knee pain, which has been an ongoing issue over the course of several years with no initial injury or incident.  We discussed continued conservative management versus surgical consult.  At this time, the patient wants to continue with conservative care.  The patient has been treated conservatively as described in the HPI above with some intermittent episodes of improvement.  She is requesting repeat injections today in both knees in an effort to improve her pain.  It has been 9-1/2 months since her last injections.  Recommend proceeding today with repeat intra-articular injections of steroid to the bilateral knees for management of joint pain/inflammation/swelling.     Patient's current insurance does not cover viscosupplementation so that is not a treatment option at this time.  I had previously ordered the patient a cane to use as needed for modified weightbearing.  It is unclear if she ever received a cane or if she has been using it.  She is ambulatory in office today with no assistive device.    Recommend the following:     -Rest and activity modification during times of increased pain.  -Modified weightbearing of the affected extremity with use of a cane or walker as needed during times of increased pain.  -Gradual progression of weightbearing and activity as pain and swelling allow.  -Elevation and ice therapy to the bilateral knees as needed to minimize pain/swelling/inflammation.  -Continue with home exercises for conditioning and strengthening of the bilateral knees/legs that she previously learned in her course of physical therapy.  As previously noted, she is currently in physical therapy again for a right hamstring injury.  -Tylenol, Aleve or Ibuprofen as needed for  pain/discomfort.    Follow-up in 3 months for recheck as needed for any new, worsening or persistent symptoms.  Follow-up sooner as needed.     Time spent of a minimum of 20 minutes including the face to face evaluation, reviewing of medical history and prior medial records, reviewing of diagnostic studies, documentation, patient education and coordination of care.     EMR Dragon/Transciption Disclaimer: Some of this note may be an electronic transcription/translation of spoken language to printed text using the Dragon Dictation System.     Return in about 3 months (around 3/6/2023), or if symptoms worsen or fail to improve, for Recheck.        This document has been electronically signed by YONY Burns on December 8, 2022 20:58 CST      YONY Burns

## 2022-12-06 NOTE — THERAPY TREATMENT NOTE
Outpatient Physical Therapy Ortho Treatment Note  [facility]     Patient Name: Pati Sotelo  : 1965  MRN: [id number]  Today's Date: 2022      Visit Date: 2022     ATTENDANCE: 10/10 (89/y)  SUBJECTIVE IMPROVEMENT: 75%  NEXT MD APPOINTMENT: 22 Dr. Ayala; 22 Maribel WALKER   RECERT DATE: 22     THERAPY DIAGNOSIS: R Hamstring pain    Visit Dx:    ICD-10-CM ICD-9-CM   1. Right leg pain  M79.604 729.5   2. Acute pain of right thigh  M79.651 729.5   3. Difficulty walking  R26.2 719.7   4. Bilateral chronic knee pain  M25.561 719.46    M25.562 338.29    G89.29        Patient Active Problem List   Diagnosis   • Peter-Parkinson-White (WPW) pattern   • Essential hypertension   • Depression   • Class 1 obesity due to excess calories with serious comorbidity and body mass index (BMI) of 32.0 to 32.9 in adult   • Personal history of colon cancer   • Bilateral primary osteoarthritis of knee   • Internal derangement of left knee   • Internal derangement of right knee   • Chronic pain of both knees        Past Medical History:   Diagnosis Date   • Abdominal pain     lower, hypo gastrium, s/p surgery      • Anomalous atrioventricular excitation    • Arthritis    • C. difficile colitis     Hx   • Colon cancer (HCC)    • Conduction disorder of the heart      Supraventricular      • Depression    • Drug therapy    • Encounter for screening for malignant neoplasm of colon    • Essential hypertension    • Family history of malignant neoplasm of digestive organ    • History of colon polyps    • History of malignant neoplasm of colon     s/p resection in 2009 and chemo   • History of malignant neoplasm of gastrointestinal tract    • History of transfusion    • Hyperaldosteronism (HCC)    • Hyperplastic polyp of large intestine    • Hypokalemia    • Intramural leiomyoma of uterus    • 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     Folate and B12 ordered      • Menstruation disorder     abnormal bleeding  from female genital tract      • MRSA infection     Hx   • Screening for malignant neoplasm of breast    • Vitamin D deficiency    • Peter-Parkinson-White (WPW) pattern         Past Surgical History:   Procedure Laterality Date   • CARDIAC ABLATION     • CARDIAC CATHETERIZATION  07/02/2007    No significant coronary artery disease with normal left ventricular function   • COLECTOMY PARTIAL / TOTAL  12/23/2009    Mid sigmoid obstructing colon cancer   • COLONOSCOPY N/A 06/29/2021    Procedure: COLONOSCOPY;  Surgeon: Jorge A Sheehan MD;  Location: [facility] ENDOSCOPY;  Service: Gastroenterology;  Laterality: N/A;   • COLONOSCOPY N/A 8/17/2022    Procedure: COLONOSCOPY;  Surgeon: Jorge A Sheehan MD;  Location: [facility] ENDOSCOPY;  Service: Gastroenterology;  Laterality: N/A;   • ECG CONVERTED  09/29/2008    Sinus rhythm with short CA. Minimal voltage criteria for LVH, may be normal variant T wave abnormality, consider inferolateral ischemia. Prolonged QT Abnormal ECG   • ENDOSCOPY AND COLONOSCOPY  06/27/2016    External and internal hemorrhoids. The examination was otherwise normal. No specimens collected   • HERNIA REPAIR     • TUBAL ABDOMINAL LIGATION     • UPPER GASTROINTESTINAL ENDOSCOPY  12/23/2009        PT Ortho     Row Name 12/06/22 1300       Precautions and Contraindications    Precautions h/o colon cancer (2009)  -IVA       Posture/Observations    Posture/Observations Comments no SPC used this date; decreased hyper extension on B knees, still ataxic at times;  -IVA          User Key  (r) = Recorded By, (t) = Taken By, (c) = Cosigned By    Initials Name Provider Type    Clemencia Rehman PTA Physical Therapist Assistant                             PT Assessment/Plan     Row Name 12/06/22 1300          PT Assessment    Functional Limitations Impaired gait;Limitation in home management;Performance in work activities;Performance in sport activities;Performance in self-care ADL;Performance in leisure activities  -IVA      Impairments Balance;Endurance;Gait;Impaired flexibility;Muscle strength;Pain  -     Assessment Comments decreased to mat and ROM only exercises this date secondary to pt having injections and pain was up in B knees; updated HEP this date; Progressing well with gait and strength;  -KH     Rehab Potential Fair  -KH     Patient/caregiver participated in establishment of treatment plan and goals Yes  -KH     Patient would benefit from skilled therapy intervention Yes  -KH        PT Plan    PT Frequency 1x/week;2x/week  -     Predicted Duration of Therapy Intervention (PT) additional 2-3 weeks  -     PT Plan Comments Resume prior level of PT next visit; Progress balance and LE strength  -           User Key  (r) = Recorded By, (t) = Taken By, (c) = Cosigned By    Initials Name Provider Type    Clemencia Rehman PTA Physical Therapist Assistant                 Modalities     Row Name 12/06/22 1300             Ice    Ice Applied Yes  -KH      Location B knees  -KH      Ice S/P Rx Yes  15 mins  -            User Key  (r) = Recorded By, (t) = Taken By, (c) = Cosigned By    Initials Name Provider Type    Clemencia Rehman PTA Physical Therapist Assistant               OP Exercises     Row Name 12/06/22 1300             Subjective Comments    Subjective Comments Patient reports that she sees the MD on friday about return to work; States that she got steroid injection in B knees today and prior to that her knees were not hurting but having some pain now.  -KH         Subjective Pain    Able to rate subjective pain? yes  -KH      Pre-Treatment Pain Level 3  -KH      Post-Treatment Pain Level 3  -KH         Exercise 1    Exercise Name 1 pro ll LE strength  -      Time 1 10 mins  -KH      Additional Comments level 1; seat 5  -KH         Exercise 2    Exercise Name 2 Bridges  -KH      Sets 2 2  -KH      Reps 2 10  -KH         Exercise 3    Exercise Name 3 Clam Shells B  -KH      Sets 3 2  -KH      Reps 3 10  -KH          "Exercise 4    Exercise Name 4 Reverse Clam Shells B  -      Sets 4 2  -KH      Reps 4 10  -KH         Exercise 5    Exercise Name 5 B Hamstring stretch w/ strap  -KH      Sets 5 3  -KH      Time 5 30\" holds  -KH         Exercise 6    Exercise Name 6 quad sets B  -KH      Sets 6 2  -KH      Reps 6 10  -KH         Exercise 7    Exercise Name 7 SLR B  -KH      Sets 7 2  -KH      Reps 7 10  -KH      Additional Comments flexion  -            User Key  (r) = Recorded By, (t) = Taken By, (c) = Cosigned By    Initials Name Provider Type    Clemencia Rehman PTA Physical Therapist Assistant                              PT OP Goals     Row Name 12/06/22 1300          PT Short Term Goals    STG Date to Achieve 11/08/22  -     STG 1 Improve R hip flex and R knee flex/ext MMT to 5/5  -     STG 1 Progress Ongoing;Progressing  -     STG 2 Improve R hamstrings flexibility 5-10° with 90/90 SLR test position  -     STG 2 Progress Met;Partially Met  -     STG 3 Reduce R knee pain by 25-50% with standing and walking  -     STG 3 Progress Met  40% improvement  -     STG 4 mild to no antalgic gait pattern with R LE with use of least AD x 135' in the clinic  -     STG 4 Progress Ongoing  -        Time Calculation    PT Goal Re-Cert Due Date 12/13/22  -           User Key  (r) = Recorded By, (t) = Taken By, (c) = Cosigned By    Initials Name Provider Type    Clemencia Rehman PTA Physical Therapist Assistant                Therapy Education  Education Details: clam shells, reverse clam shells. SLR flexion, bridges  Given: HEP  Program: New, Modified  How Provided: Verbal, Demonstration, Written  Provided to: Patient  Level of Understanding: Teach back education performed, Verbalized, Demonstrated              Time Calculation:   Start Time: 1347  Stop Time: 1442  Time Calculation (min): 55 min  Therapy Charges for Today     Code Description Service Date Service Provider Modifiers Qty    96608541635  PT THER PROC EA 15 " MIN 12/6/2022 Clemencia Baltazar PTA GP, CQ 3    [id number]  PT THER SUPP EA 15 MIN 12/6/2022 Clemencia Baltazar PTA GP 1                    Clemencia Baltazar PTA  12/6/2022

## 2022-12-07 ENCOUNTER — ANESTHESIA (OUTPATIENT)
Dept: GASTROENTEROLOGY | Facility: HOSPITAL | Age: 57
End: 2022-12-07

## 2022-12-07 ENCOUNTER — HOSPITAL ENCOUNTER (OUTPATIENT)
Facility: HOSPITAL | Age: 57
Setting detail: HOSPITAL OUTPATIENT SURGERY
Discharge: HOME | End: 2022-12-07
Attending: INTERNAL MEDICINE | Admitting: INTERNAL MEDICINE

## 2022-12-07 ENCOUNTER — ANESTHESIA EVENT (OUTPATIENT)
Dept: GASTROENTEROLOGY | Facility: HOSPITAL | Age: 57
End: 2022-12-07

## 2022-12-07 VITALS
HEIGHT: 63 IN | BODY MASS INDEX: 32.43 KG/M2 | RESPIRATION RATE: 18 BRPM | HEART RATE: 98 BPM | OXYGEN SATURATION: 100 % | TEMPERATURE: 98.3 F | DIASTOLIC BLOOD PRESSURE: 74 MMHG | SYSTOLIC BLOOD PRESSURE: 132 MMHG | WEIGHT: 183 LBS

## 2022-12-07 DIAGNOSIS — Z12.11 ENCOUNTER FOR SCREENING FOR MALIGNANT NEOPLASM OF COLON: ICD-10-CM

## 2022-12-07 DIAGNOSIS — Z85.038 PERSONAL HISTORY OF COLON CANCER: ICD-10-CM

## 2022-12-07 PROCEDURE — 25010000002 PROPOFOL 10 MG/ML EMULSION: Performed by: NURSE ANESTHETIST, CERTIFIED REGISTERED

## 2022-12-07 PROCEDURE — 45378 DIAGNOSTIC COLONOSCOPY: CPT | Performed by: INTERNAL MEDICINE

## 2022-12-07 RX ORDER — PROPOFOL 10 MG/ML
VIAL (ML) INTRAVENOUS AS NEEDED
Status: DISCONTINUED | OUTPATIENT
Start: 2022-12-07 | End: 2022-12-07 | Stop reason: SURG

## 2022-12-07 RX ORDER — DEXTROSE AND SODIUM CHLORIDE 5; .45 G/100ML; G/100ML
30 INJECTION, SOLUTION INTRAVENOUS CONTINUOUS PRN
Status: DISCONTINUED | OUTPATIENT
Start: 2022-12-07 | End: 2022-12-07 | Stop reason: HOSPADM

## 2022-12-07 RX ORDER — ONDANSETRON 2 MG/ML
4 INJECTION INTRAMUSCULAR; INTRAVENOUS ONCE AS NEEDED
Status: DISCONTINUED | OUTPATIENT
Start: 2022-12-07 | End: 2022-12-07 | Stop reason: HOSPADM

## 2022-12-07 RX ADMIN — PROPOFOL 80 MG: 10 INJECTION, EMULSION INTRAVENOUS at 13:12

## 2022-12-07 RX ADMIN — PROPOFOL 20 MG: 10 INJECTION, EMULSION INTRAVENOUS at 13:19

## 2022-12-07 RX ADMIN — DEXTROSE AND SODIUM CHLORIDE 30 ML/HR: 5; 450 INJECTION, SOLUTION INTRAVENOUS at 12:12

## 2022-12-07 RX ADMIN — PROPOFOL 20 MG: 10 INJECTION, EMULSION INTRAVENOUS at 13:14

## 2022-12-07 RX ADMIN — PROPOFOL 30 MG: 10 INJECTION, EMULSION INTRAVENOUS at 13:17

## 2022-12-07 NOTE — ANESTHESIA PREPROCEDURE EVALUATION
Anesthesia Evaluation     no history of anesthetic complications:  NPO Solid Status: > 8 hours  NPO Liquid Status: > 2 hours           Airway   Mallampati: II  TM distance: >3 FB  Neck ROM: full  No difficulty expected  Dental - normal exam     Pulmonary - negative pulmonary ROS and normal exam   (-) not a smoker  Cardiovascular - normal exam    Patient on routine beta blocker    (+) hypertension 2 medications or greater, dysrhythmias (WPW),   (-) CAD      Neuro/Psych  (+) psychiatric history Depression,    (-) seizures, CVA  GI/Hepatic/Renal/Endo    (+) obesity,     (-) diabetes    Musculoskeletal     Abdominal    Substance History - negative use     OB/GYN negative ob/gyn ROS         Other   arthritis,    history of cancer (COLON) remission                      Anesthesia Plan    ASA 3     general   total IV anesthesia  intravenous induction     Anesthetic plan, risks, benefits, and alternatives have been provided, discussed and informed consent has been obtained with: patient.  Pre-procedure education provided

## 2022-12-07 NOTE — ADDENDUM NOTE
Addendum  created 12/07/22 1345 by Angelita Suarez, CRNA    Review and Sign - Ready for Procedure

## 2022-12-07 NOTE — ANESTHESIA POSTPROCEDURE EVALUATION
Patient: Pati Sotelo    Procedure Summary     Date: 12/07/22 Room / Location: [facility] ENDOSCOPY 1 / [facility] ENDOSCOPY    Anesthesia Start: 1308 Anesthesia Stop: 1323    Procedure: COLONOSCOPY Diagnosis:       Encounter for screening for malignant neoplasm of colon      Personal history of colon cancer      (Encounter for screening for malignant neoplasm of colon [Z12.11])      (Personal history of colon cancer [Z85.038])    Surgeons: Jorge A Sheehan MD Provider: Angelita Suarez CRNA    Anesthesia Type: general ASA Status: 3          Anesthesia Type: general    Vitals  No vitals data found for the desired time range.          Post Anesthesia Care and Evaluation    Patient location during evaluation: bedside  Patient participation: complete - patient participated  Level of consciousness: sleepy but conscious  Pain score: 0  Pain management: adequate    Airway patency: patent  Anesthetic complications: No anesthetic complications  PONV Status: none  Cardiovascular status: hemodynamically stable  Respiratory status: room air  Hydration status: acceptable

## 2022-12-08 PROBLEM — M23.300 DEGENERATIVE TEAR OF LATERAL MENISCUS OF RIGHT KNEE: Status: ACTIVE | Noted: 2022-12-08

## 2022-12-08 PROBLEM — M94.262 CHONDROMALACIA OF LEFT KNEE: Status: ACTIVE | Noted: 2022-12-08

## 2022-12-08 PROBLEM — M23.301 DEGENERATIVE TEAR OF LATERAL MENISCUS OF LEFT KNEE: Status: ACTIVE | Noted: 2022-12-08

## 2022-12-08 PROBLEM — M25.461 EFFUSION OF RIGHT KNEE: Status: ACTIVE | Noted: 2022-12-08

## 2022-12-08 PROBLEM — M25.462 EFFUSION OF LEFT KNEE: Status: ACTIVE | Noted: 2022-12-08

## 2022-12-08 PROBLEM — M94.261 CHONDROMALACIA OF RIGHT KNEE: Status: ACTIVE | Noted: 2022-12-08

## 2022-12-08 RX ORDER — TRIAMCINOLONE ACETONIDE 40 MG/ML
40 INJECTION, SUSPENSION INTRA-ARTICULAR; INTRAMUSCULAR
Status: COMPLETED | OUTPATIENT
Start: 2022-12-06 | End: 2022-12-06

## 2022-12-08 RX ORDER — LIDOCAINE HYDROCHLORIDE 10 MG/ML
2 INJECTION, SOLUTION INFILTRATION; PERINEURAL
Status: COMPLETED | OUTPATIENT
Start: 2022-12-06 | End: 2022-12-06

## 2022-12-09 ENCOUNTER — OFFICE VISIT (OUTPATIENT)
Dept: FAMILY MEDICINE CLINIC | Facility: CLINIC | Age: 57
End: 2022-12-09

## 2022-12-09 VITALS
WEIGHT: 183 LBS | DIASTOLIC BLOOD PRESSURE: 84 MMHG | HEIGHT: 63 IN | BODY MASS INDEX: 32.43 KG/M2 | SYSTOLIC BLOOD PRESSURE: 132 MMHG

## 2022-12-09 DIAGNOSIS — M23.91 INTERNAL DERANGEMENT OF RIGHT KNEE: Primary | Chronic | ICD-10-CM

## 2022-12-09 DIAGNOSIS — E66.09 CLASS 1 OBESITY DUE TO EXCESS CALORIES WITH SERIOUS COMORBIDITY AND BODY MASS INDEX (BMI) OF 32.0 TO 32.9 IN ADULT: Chronic | ICD-10-CM

## 2022-12-09 DIAGNOSIS — F32.4 MAJOR DEPRESSIVE DISORDER WITH SINGLE EPISODE, IN PARTIAL REMISSION: Chronic | ICD-10-CM

## 2022-12-09 DIAGNOSIS — M23.92 INTERNAL DERANGEMENT OF LEFT KNEE: Chronic | ICD-10-CM

## 2022-12-09 DIAGNOSIS — M17.0 BILATERAL PRIMARY OSTEOARTHRITIS OF KNEE: Chronic | ICD-10-CM

## 2022-12-09 DIAGNOSIS — I10 ESSENTIAL HYPERTENSION: Chronic | ICD-10-CM

## 2022-12-09 PROCEDURE — 99214 OFFICE O/P EST MOD 30 MIN: CPT | Performed by: FAMILY MEDICINE

## 2022-12-09 NOTE — PROGRESS NOTES
Subjective   Pati Sotelo is a 57 y.o. female.  Reevaluation bilateral internal derangement knees difficulty walking related to same hypertension history of colon cancer.  In interim is now doing physical therapy and getting shots of the knees from orthopedics.  Is having some issue deciding on disability from work.  We have counseled we will be able to fill out some forms with the rest forms need to be filled out by her orthopedist since her limiting factor is orthopedic.  Medicines have remained the same otherwise.    History of Present Illness   HPI    The following portions of the patient's history were reviewed and updated as appropriate: allergies, current medications, past family history, past medical history, past social history, past surgical history and problem list.    Review of Systems  Review of Systems   Constitutional: Negative for activity change, appetite change, fatigue and unexpected weight change.   HENT: Negative for trouble swallowing and voice change.    Eyes: Negative for redness and visual disturbance.   Respiratory: Negative for cough and wheezing.    Cardiovascular: Negative for chest pain and palpitations.   Gastrointestinal: Negative for abdominal pain, constipation, diarrhea, nausea and vomiting.   Genitourinary: Negative for urgency.   Musculoskeletal: Positive for arthralgias, gait problem and joint swelling.   Neurological: Negative for syncope and headaches.   Hematological: Negative for adenopathy.   Psychiatric/Behavioral: Negative for sleep disturbance.       Objective   Physical Exam  Physical Exam  Vitals reviewed.   Constitutional:       Appearance: Normal appearance. She is obese.   Musculoskeletal:      Comments: Get up and go 3 to 5 seconds slightly antalgic and wide-based gait   Psychiatric:         Mood and Affect: Affect is blunt.         Speech: Speech is delayed.         Behavior: Behavior is slowed.           Visit Vitals  /84 (BP Location: Left arm,  "Patient Position: Sitting, Cuff Size: Adult)   Ht 160 cm (63\")   Wt 83 kg (183 lb)   LMP 09/08/2017   BMI 32.42 kg/m²     Body mass index is 32.42 kg/m².  /84 (BP Location: Left arm, Patient Position: Sitting, Cuff Size: Adult)   Ht 160 cm (63\")   Wt 83 kg (183 lb)   LMP 09/08/2017   BMI 32.42 kg/m²       Assessment/Plan   Diagnoses and all orders for this visit:    1. Internal derangement of right knee (Primary)    2. Internal derangement of left knee    3. Bilateral primary osteoarthritis of knee    4. Major depressive disorder with single episode, in partial remission (HCC)    5. Class 1 obesity due to excess calories with serious comorbidity and body mass index (BMI) of 32.0 to 32.9 in adult    6. Essential hypertension    Have counseled on following up with orthopedics have counseled on lifestyle measures as always including weight loss diet and exercise continue all medicines including blood pressure medicine behavioral health medicines.  Recheck blood pressure here 6 months  "

## 2022-12-13 ENCOUNTER — HOSPITAL ENCOUNTER (OUTPATIENT)
Dept: PHYSICAL THERAPY | Facility: HOSPITAL | Age: 57
Setting detail: THERAPIES SERIES
Discharge: HOME | End: 2022-12-13

## 2022-12-13 DIAGNOSIS — R26.2 DIFFICULTY WALKING: ICD-10-CM

## 2022-12-13 DIAGNOSIS — M79.604 RIGHT LEG PAIN: Primary | ICD-10-CM

## 2022-12-13 DIAGNOSIS — M79.651 ACUTE PAIN OF RIGHT THIGH: ICD-10-CM

## 2022-12-13 PROCEDURE — 97110 THERAPEUTIC EXERCISES: CPT | Performed by: PHYSICAL THERAPIST

## 2022-12-13 PROCEDURE — 97116 GAIT TRAINING THERAPY: CPT | Performed by: PHYSICAL THERAPIST

## 2022-12-13 NOTE — THERAPY PROGRESS REPORT/RE-CERT
Outpatient Physical Therapy Ortho Progress Note  [facility]     Patient Name: Pati Sotelo  : 1965  MRN: [id number]  Today's Date: 2022      Visit Date: 2022  ATTENDANCE:  (89/y)  SUBJECTIVE IMPROVEMENT: 75%  NEXT MD APPOINTMENT: neurologist 2023  RECERT DATE: 1/3/23     THERAPY DIAGNOSIS: R Hamstring pain  Visit Dx:    ICD-10-CM ICD-9-CM   1. Right leg pain  M79.604 729.5   2. Acute pain of right thigh  M79.651 729.5   3. Difficulty walking  R26.2 719.7       Patient Active Problem List   Diagnosis   • Peter-Parkinson-White (WPW) pattern   • Essential hypertension   • Depression   • Class 1 obesity due to excess calories with serious comorbidity and body mass index (BMI) of 32.0 to 32.9 in adult   • Personal history of colon cancer   • Bilateral primary osteoarthritis of knee   • Internal derangement of left knee   • Internal derangement of right knee   • Chronic pain of both knees   • Degenerative tear of lateral meniscus of right knee   • Degenerative tear of lateral meniscus of left knee   • Effusion of right knee   • Effusion of left knee   • Chondromalacia of left knee   • Chondromalacia of right knee        Past Medical History:   Diagnosis Date   • Abdominal pain     lower, hypo gastrium, s/p surgery      • Anomalous atrioventricular excitation    • Arthritis    • C. difficile colitis     Hx   • Colon cancer (HCC)    • Conduction disorder of the heart      Supraventricular      • Depression    • Drug therapy    • Encounter for screening for malignant neoplasm of colon    • Essential hypertension    • Family history of malignant neoplasm of digestive organ    • History of colon polyps    • History of malignant neoplasm of colon     s/p resection in 2009 and chemo   • History of malignant neoplasm of gastrointestinal tract    • History of transfusion    • Hyperaldosteronism (HCC)    • Hyperplastic polyp of large intestine    • Hypokalemia    • Intramural leiomyoma  of uterus    • 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     Folate and B12 ordered      • Menstruation disorder     abnormal bleeding from female genital tract      • MRSA infection     Hx   • Screening for malignant neoplasm of breast    • Vitamin D deficiency    • Peter-Parkinson-White (WPW) pattern         Past Surgical History:   Procedure Laterality Date   • CARDIAC ABLATION     • CARDIAC CATHETERIZATION  07/02/2007    No significant coronary artery disease with normal left ventricular function   • COLECTOMY PARTIAL / TOTAL  12/23/2009    Mid sigmoid obstructing colon cancer   • COLONOSCOPY N/A 06/29/2021    Procedure: COLONOSCOPY;  Surgeon: Jorge A Sheehan MD;  Location: [facility] ENDOSCOPY;  Service: Gastroenterology;  Laterality: N/A;   • COLONOSCOPY N/A 8/17/2022    Procedure: COLONOSCOPY;  Surgeon: Jorge A Sheehan MD;  Location: [facility] ENDOSCOPY;  Service: Gastroenterology;  Laterality: N/A;   • ECG CONVERTED  09/29/2008    Sinus rhythm with short FL. Minimal voltage criteria for LVH, may be normal variant T wave abnormality, consider inferolateral ischemia. Prolonged QT Abnormal ECG   • ENDOSCOPY AND COLONOSCOPY  06/27/2016    External and internal hemorrhoids. The examination was otherwise normal. No specimens collected   • HERNIA REPAIR     • TUBAL ABDOMINAL LIGATION     • UPPER GASTROINTESTINAL ENDOSCOPY  12/23/2009        PT Ortho     Row Name 12/13/22 5301       Subjective Comments    Subjective Comments Got started on disability paperwork, went to the social security office. Dr. Ayala recommended pt pursue disability due to R>L knee pain.  -BS       Precautions and Contraindications    Precautions h/o colon cancer (2009)  -BS       Subjective Pain    Able to rate subjective pain? yes  -BS    Pre-Treatment Pain Level 1  -BS    Post-Treatment Pain Level 1  -BS       Posture/Observations    Posture/Observations Comments severely ataxic gait pattern with excessive B knee ext during gait cycle despite vc's to  increase B hip flex during swing phase, intermittent scissoring pattern bilat.  -BS       General ROM    GENERAL ROM COMMENTS R knee 0-137° L knee 0-136°  -BS       MMT (Manual Muscle Testing)    General MMT Comments R LE-hip flex 4+/5 R hip abd 4+/5 hip add 4/5 knee flex 4+/5 knee ext 4+/5; L hip flex 4+/5 hip abd 3+/5 hip add 3+/5  -BS       Lower Extremity Flexibility    Hamstrings Bilateral:;Mildly limited  R -20° L -28° at 90/90 positioin  -BS          User Key  (r) = Recorded By, (t) = Taken By, (c) = Cosigned By    Initials Name Provider Type    Mike Bullock, PT Physical Therapist                             PT Assessment/Plan     Row Name 12/13/22 1203          PT Assessment    Assessment Comments Slight improvement in B LE strength, however, severe B LE ataxia remains and severely unsteady gait pattern without an AD, improved with use of a RW. Limited by B hip weakness, especially with abductor/adductor weakness.  -BS        PT Plan    PT Frequency 1x/week;2x/week  -BS     Predicted Duration of Therapy Intervention (PT) additional 2 weeks  -BS     PT Plan Comments continue LE strengthening, LE coordination exercises and gait training with RW.  -BS           User Key  (r) = Recorded By, (t) = Taken By, (c) = Cosigned By    Initials Name Provider Type    Mike Bullock, PT Physical Therapist                   OP Exercises     Row Name 12/13/22 4882             Subjective Comments    Subjective Comments Got started on disability paperwork, went to the social security office. Dr. Ayala recommended pt pursue disability due to R>L knee pain.  -BS         Subjective Pain    Able to rate subjective pain? yes  -BS      Pre-Treatment Pain Level 1  -BS      Post-Treatment Pain Level 1  -BS         Exercise 1    Exercise Name 1 pro ll LE strength  -BS      Time 1 10 mins  -BS         Exercise 2    Exercise Name 2 gait training with RW  -BS      Time 2 100', SBA w/ vc's  -BS         Exercise 3    Exercise Name 3  gait training with SC + HHA of PT  -BS      Time 3 70'  -BS      Additional Comments unsteady gait pattern, excessive wt bearing on therapist due to instability  -BS         Exercise 4    Exercise Name 4 SL hip abduction AROM, bilat  -BS      Sets 4 2  -BS      Reps 4 10  -BS         Exercise 5    Exercise Name 5 SL B hip adduction  -BS      Sets 5 1  -BS      Reps 5 10  -BS      Additional Comments AROM  -BS         Exercise 6    Exercise Name 6 MMT/ROM reassessment  -BS      Time 6 2 mins  -BS            User Key  (r) = Recorded By, (t) = Taken By, (c) = Cosigned By    Initials Name Provider Type    BS Mike Christensen, PT Physical Therapist                              PT OP Goals     Row Name 12/13/22 1400          PT Short Term Goals    STG Date to Achieve 12/27/22  -BS     STG 1 Improve R hip flex and R knee flex/ext MMT to 5/5  -BS     STG 1 Progress Ongoing;Progressing  -BS     STG 2 Improve R hamstrings flexibility 5-10° with 90/90 SLR test position  -BS     STG 2 Progress Not Met  -BS     STG 3 Reduce R knee pain by 25-50% with standing and walking  -BS     STG 3 Progress Met  80% improvement  -BS     STG 4 mild to no antalgic gait pattern with R LE with use of least AD x 135' in the clinic  -BS     STG 4 Progress Ongoing  -BS     STG 5 Improve B hip abduction/adduction MMT to 4+/5  -BS     STG 5 Progress New  -BS        Time Calculation    PT Goal Re-Cert Due Date 01/03/23  -BS           User Key  (r) = Recorded By, (t) = Taken By, (c) = Cosigned By    Initials Name Provider Type    Mike Bullock PT Physical Therapist                               Time Calculation:   Start Time: 1435  Stop Time: 1520  Time Calculation (min): 45 min  Total Timed Code Minutes- PT: 45 minute(s)  Therapy Charges for Today     Code Description Service Date Service Provider Modifiers Qty    43250439383 HC PT THER PROC EA 15 MIN 12/13/2022 Mike Christensen, PT GP 2    [id number] HC GAIT TRAINING EA 15 MIN 12/13/2022 Amparo  Mike AIKEN, PT GP 1                    Mike Christensen, PT  12/13/2022

## 2022-12-14 ENCOUNTER — TELEPHONE (OUTPATIENT)
Dept: FAMILY MEDICINE CLINIC | Facility: CLINIC | Age: 57
End: 2022-12-14

## 2022-12-14 NOTE — TELEPHONE ENCOUNTER
Pt says Dr. Ayala told her to apply for permanent disability and she talked orthopedics and they advised her that she needed to let Dr. Ayala handle this. She would like a call back

## 2022-12-21 ENCOUNTER — LAB (OUTPATIENT)
Dept: ONCOLOGY | Facility: HOSPITAL | Age: 57
End: 2022-12-21

## 2022-12-21 ENCOUNTER — OFFICE VISIT (OUTPATIENT)
Dept: ONCOLOGY | Facility: CLINIC | Age: 57
End: 2022-12-21

## 2022-12-21 ENCOUNTER — HOSPITAL ENCOUNTER (OUTPATIENT)
Dept: PHYSICAL THERAPY | Facility: HOSPITAL | Age: 57
Setting detail: THERAPIES SERIES
Discharge: HOME | End: 2022-12-21

## 2022-12-21 VITALS
TEMPERATURE: 97.2 F | SYSTOLIC BLOOD PRESSURE: 161 MMHG | WEIGHT: 183.2 LBS | HEART RATE: 92 BPM | BODY MASS INDEX: 32.45 KG/M2 | DIASTOLIC BLOOD PRESSURE: 104 MMHG | OXYGEN SATURATION: 93 % | RESPIRATION RATE: 18 BRPM

## 2022-12-21 DIAGNOSIS — Z85.038 PERSONAL HISTORY OF COLON CANCER: ICD-10-CM

## 2022-12-21 DIAGNOSIS — R94.4 ABNORMAL RENAL FUNCTION TEST: ICD-10-CM

## 2022-12-21 DIAGNOSIS — M79.651 ACUTE PAIN OF RIGHT THIGH: ICD-10-CM

## 2022-12-21 DIAGNOSIS — Z85.038 PERSONAL HISTORY OF COLON CANCER: Primary | Chronic | ICD-10-CM

## 2022-12-21 DIAGNOSIS — M25.562 BILATERAL CHRONIC KNEE PAIN: ICD-10-CM

## 2022-12-21 DIAGNOSIS — M79.604 RIGHT LEG PAIN: Primary | ICD-10-CM

## 2022-12-21 DIAGNOSIS — M25.561 BILATERAL CHRONIC KNEE PAIN: ICD-10-CM

## 2022-12-21 DIAGNOSIS — G89.29 BILATERAL CHRONIC KNEE PAIN: ICD-10-CM

## 2022-12-21 DIAGNOSIS — R26.2 DIFFICULTY WALKING: ICD-10-CM

## 2022-12-21 LAB
ALBUMIN SERPL-MCNC: 4.2 G/DL (ref 3.5–5.2)
ALBUMIN SERPL-MCNC: 4.2 G/DL (ref 3.5–5.2)
ALBUMIN/GLOB SERPL: 1.4 G/DL
ALP SERPL-CCNC: 116 U/L (ref 39–117)
ALT SERPL W P-5'-P-CCNC: 25 U/L (ref 1–33)
ANION GAP SERPL CALCULATED.3IONS-SCNC: 10 MMOL/L (ref 5–15)
ANION GAP SERPL CALCULATED.3IONS-SCNC: 13 MMOL/L (ref 5–15)
AST SERPL-CCNC: 19 U/L (ref 1–32)
BASOPHILS # BLD AUTO: 0.07 10*3/MM3 (ref 0–0.2)
BASOPHILS NFR BLD AUTO: 0.8 % (ref 0–1.5)
BILIRUB SERPL-MCNC: 0.3 MG/DL (ref 0–1.2)
BUN SERPL-MCNC: 17 MG/DL (ref 6–20)
BUN SERPL-MCNC: 17 MG/DL (ref 6–20)
BUN/CREAT SERPL: 17.3 (ref 7–25)
BUN/CREAT SERPL: 17.3 (ref 7–25)
CALCIUM SPEC-SCNC: 10.1 MG/DL (ref 8.6–10.5)
CALCIUM SPEC-SCNC: 10.1 MG/DL (ref 8.6–10.5)
CEA SERPL-MCNC: 1.52 NG/ML
CHLORIDE SERPL-SCNC: 109 MMOL/L (ref 98–107)
CHLORIDE SERPL-SCNC: 109 MMOL/L (ref 98–107)
CO2 SERPL-SCNC: 20 MMOL/L (ref 22–29)
CO2 SERPL-SCNC: 23 MMOL/L (ref 22–29)
CREAT SERPL-MCNC: 0.98 MG/DL (ref 0.57–1)
CREAT SERPL-MCNC: 0.98 MG/DL (ref 0.57–1)
DEPRECATED RDW RBC AUTO: 39.3 FL (ref 37–54)
EGFRCR SERPLBLD CKD-EPI 2021: 67 ML/MIN/1.73
EGFRCR SERPLBLD CKD-EPI 2021: 67 ML/MIN/1.73
EOSINOPHIL # BLD AUTO: 0.08 10*3/MM3 (ref 0–0.4)
EOSINOPHIL NFR BLD AUTO: 0.9 % (ref 0.3–6.2)
ERYTHROCYTE [DISTWIDTH] IN BLOOD BY AUTOMATED COUNT: 12.3 % (ref 12.3–15.4)
GLOBULIN UR ELPH-MCNC: 3 GM/DL
GLUCOSE SERPL-MCNC: 106 MG/DL (ref 65–99)
GLUCOSE SERPL-MCNC: 107 MG/DL (ref 65–99)
HCT VFR BLD AUTO: 44.8 % (ref 34–46.6)
HGB BLD-MCNC: 15.1 G/DL (ref 12–15.9)
IMM GRANULOCYTES # BLD AUTO: 0.03 10*3/MM3 (ref 0–0.05)
IMM GRANULOCYTES NFR BLD AUTO: 0.3 % (ref 0–0.5)
LYMPHOCYTES # BLD AUTO: 1.59 10*3/MM3 (ref 0.7–3.1)
LYMPHOCYTES NFR BLD AUTO: 17.5 % (ref 19.6–45.3)
MCH RBC QN AUTO: 29.5 PG (ref 26.6–33)
MCHC RBC AUTO-ENTMCNC: 33.7 G/DL (ref 31.5–35.7)
MCV RBC AUTO: 87.7 FL (ref 79–97)
MONOCYTES # BLD AUTO: 0.74 10*3/MM3 (ref 0.1–0.9)
MONOCYTES NFR BLD AUTO: 8.1 % (ref 5–12)
NEUTROPHILS NFR BLD AUTO: 6.58 10*3/MM3 (ref 1.7–7)
NEUTROPHILS NFR BLD AUTO: 72.4 % (ref 42.7–76)
NRBC BLD AUTO-RTO: 0 /100 WBC (ref 0–0.2)
PHOSPHATE SERPL-MCNC: 2.8 MG/DL (ref 2.5–4.5)
PLATELET # BLD AUTO: 353 10*3/MM3 (ref 140–450)
PMV BLD AUTO: 9.6 FL (ref 6–12)
POTASSIUM SERPL-SCNC: 4 MMOL/L (ref 3.5–5.2)
POTASSIUM SERPL-SCNC: 4 MMOL/L (ref 3.5–5.2)
PROT SERPL-MCNC: 7.2 G/DL (ref 6–8.5)
RBC # BLD AUTO: 5.11 10*6/MM3 (ref 3.77–5.28)
SODIUM SERPL-SCNC: 142 MMOL/L (ref 136–145)
SODIUM SERPL-SCNC: 142 MMOL/L (ref 136–145)
WBC NRBC COR # BLD: 9.09 10*3/MM3 (ref 3.4–10.8)

## 2022-12-21 PROCEDURE — 82378 CARCINOEMBRYONIC ANTIGEN: CPT

## 2022-12-21 PROCEDURE — G0463 HOSPITAL OUTPT CLINIC VISIT: HCPCS | Performed by: NURSE PRACTITIONER

## 2022-12-21 PROCEDURE — 85025 COMPLETE CBC W/AUTO DIFF WBC: CPT

## 2022-12-21 PROCEDURE — 97110 THERAPEUTIC EXERCISES: CPT

## 2022-12-21 PROCEDURE — 84100 ASSAY OF PHOSPHORUS: CPT

## 2022-12-21 PROCEDURE — 99213 OFFICE O/P EST LOW 20 MIN: CPT | Performed by: NURSE PRACTITIONER

## 2022-12-21 PROCEDURE — 80053 COMPREHEN METABOLIC PANEL: CPT

## 2022-12-21 NOTE — THERAPY TREATMENT NOTE
Outpatient Physical Therapy Ortho Treatment Note  [facility]     Patient Name: Pati Sotelo  : 1965  MRN: [id number]  Today's Date: 2022      Visit Date: 2022     ATTENDANCE:  (90/y)  SUBJECTIVE IMPROVEMENT: 75%  NEXT MD APPOINTMENT: neurologist 2023  RECERT DATE: 1/3/23     THERAPY DIAGNOSIS: R Hamstring pain    Visit Dx:    ICD-10-CM ICD-9-CM   1. Right leg pain  M79.604 729.5   2. Acute pain of right thigh  M79.651 729.5   3. Difficulty walking  R26.2 719.7   4. Bilateral chronic knee pain  M25.561 719.46    M25.562 338.29    G89.29        Patient Active Problem List   Diagnosis   • Peter-Parkinson-White (WPW) pattern   • Essential hypertension   • Depression   • Class 1 obesity due to excess calories with serious comorbidity and body mass index (BMI) of 32.0 to 32.9 in adult   • Personal history of colon cancer   • Bilateral primary osteoarthritis of knee   • Internal derangement of left knee   • Internal derangement of right knee   • Chronic pain of both knees   • Degenerative tear of lateral meniscus of right knee   • Degenerative tear of lateral meniscus of left knee   • Effusion of right knee   • Effusion of left knee   • Chondromalacia of left knee   • Chondromalacia of right knee        Past Medical History:   Diagnosis Date   • Abdominal pain     lower, hypo gastrium, s/p surgery      • Anomalous atrioventricular excitation    • Arthritis    • C. difficile colitis     Hx   • Colon cancer (HCC)    • Conduction disorder of the heart      Supraventricular      • Depression    • Drug therapy    • Encounter for screening for malignant neoplasm of colon    • Essential hypertension    • Family history of malignant neoplasm of digestive organ    • History of colon polyps    • History of malignant neoplasm of colon     s/p resection in 2009 and chemo   • History of malignant neoplasm of gastrointestinal tract    • History of transfusion    • Hyperaldosteronism (HCC)     • Hyperplastic polyp of large intestine    • Hypokalemia    • Intramural leiomyoma of uterus    • 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     Folate and B12 ordered      • Menstruation disorder     abnormal bleeding from female genital tract      • MRSA infection     Hx   • Screening for malignant neoplasm of breast    • Vitamin D deficiency    • Peter-Parkinson-White (WPW) pattern         Past Surgical History:   Procedure Laterality Date   • CARDIAC ABLATION     • CARDIAC CATHETERIZATION  07/02/2007    No significant coronary artery disease with normal left ventricular function   • COLECTOMY PARTIAL / TOTAL  12/23/2009    Mid sigmoid obstructing colon cancer   • COLONOSCOPY N/A 06/29/2021    Procedure: COLONOSCOPY;  Surgeon: Jorge A Sheehan MD;  Location: [facility] ENDOSCOPY;  Service: Gastroenterology;  Laterality: N/A;   • COLONOSCOPY N/A 8/17/2022    Procedure: COLONOSCOPY;  Surgeon: Jorge A Sheehan MD;  Location: [facility] ENDOSCOPY;  Service: Gastroenterology;  Laterality: N/A;   • COLONOSCOPY N/A 12/7/2022    Procedure: COLONOSCOPY;  Surgeon: Jorge A Sheehan MD;  Location: [facility] ENDOSCOPY;  Service: Gastroenterology;  Laterality: N/A;   • ECG CONVERTED  09/29/2008    Sinus rhythm with short IL. Minimal voltage criteria for LVH, may be normal variant T wave abnormality, consider inferolateral ischemia. Prolonged QT Abnormal ECG   • ENDOSCOPY AND COLONOSCOPY  06/27/2016    External and internal hemorrhoids. The examination was otherwise normal. No specimens collected   • HERNIA REPAIR     • TUBAL ABDOMINAL LIGATION     • UPPER GASTROINTESTINAL ENDOSCOPY  12/23/2009        PT Ortho     Row Name 12/21/22 1400       Precautions and Contraindications    Precautions h/o colon cancer (2009)  -IVA       Posture/Observations    Posture/Observations Comments ataxic gait; using [facility] [place]  -          User Key  (r) = Recorded By, (t) = Taken By, (c) = Cosigned By    Initials Name Provider Type    Clemencia Rehman PTA Physical  "Therapist Assistant                             PT Assessment/Plan     Row Name 12/21/22 1400          PT Assessment    Functional Limitations Impaired gait;Limitation in home management;Performance in work activities;Performance in sport activities;Performance in self-care ADL;Performance in leisure activities  -     Impairments Balance;Endurance;Gait;Impaired flexibility;Muscle strength;Pain  -     Assessment Comments decreased treatment this date secondary to patient late for appt; did well with new stabilization and gait activites; no increaed pain  -     Rehab Potential Fair  -     Patient/caregiver participated in establishment of treatment plan and goals Yes  -     Patient would benefit from skilled therapy intervention Yes  -KH        PT Plan    PT Frequency 1x/week;2x/week  -     PT Plan Comments Continue to progress strength gait and balance as able.  -           User Key  (r) = Recorded By, (t) = Taken By, (c) = Cosigned By    Initials Name Provider Type    Clemencia Rehman PTA Physical Therapist Assistant                   OP Exercises     Row Name 12/21/22 1400             Subjective Comments    Subjective Comments Patient reports that she has been using her cane again; Reports that her pain is more like a burning pain in the knee;  -         Subjective Pain    Able to rate subjective pain? yes  -      Pre-Treatment Pain Level 1  -      Post-Treatment Pain Level 1  -         Exercise 1    Exercise Name 1 pro ll LE strength/ROM  -KH      Time 1 10 mins  -      Additional Comments level 2; seat 6  -KH         Exercise 2    Exercise Name 2 Lateral stepping at plynth table  -      Reps 2 10ft x10  -KH         Exercise 3    Exercise Name 3 bkd walking--->high knee  -KH      Reps 3 10ft x5 each  -KH         Exercise 4    Exercise Name 4 sit to stands  -      Sets 4 3  -KH      Reps 4 5  -KH         Exercise 5    Exercise Name 5 step taps at 4\" step  -KH      Sets 5 2  -KH      Reps " 5 10  -KH            User Key  (r) = Recorded By, (t) = Taken By, (c) = Cosigned By    Initials Name Provider Type    Clemencia Rehman PTA Physical Therapist Assistant                              PT OP Goals     Row Name 12/21/22 1400          PT Short Term Goals    STG Date to Achieve 12/27/22  -     STG 1 Improve R hip flex and R knee flex/ext MMT to 5/5  -KH     STG 1 Progress Ongoing;Progressing  -     STG 2 Improve R hamstrings flexibility 5-10° with 90/90 SLR test position  -     STG 2 Progress Not Met  -     STG 3 Reduce R knee pain by 25-50% with standing and walking  -     STG 3 Progress Met  80% improvement  -     STG 4 mild to no antalgic gait pattern with R LE with use of least AD x 135' in the clinic  -     STG 4 Progress Ongoing  -     STG 5 Improve B hip abduction/adduction MMT to 4+/5  -KH     STG 5 Progress New  -        Time Calculation    PT Goal Re-Cert Due Date 01/03/23  -           User Key  (r) = Recorded By, (t) = Taken By, (c) = Cosigned By    Initials Name Provider Type    Clemencia Rehman PTA Physical Therapist Assistant                               Time Calculation:   Start Time: 1449  Stop Time: 1513  Time Calculation (min): 24 min  Therapy Charges for Today     Code Description Service Date Service Provider Modifiers Qty    46603464979 HC PT THER PROC EA 15 MIN 12/21/2022 Clemencia Baltazar PTA GP, CQ 2                    Clemencia Baltazar PTA  12/21/2022

## 2022-12-22 NOTE — PROGRESS NOTES
DATE OF VISIT: 12/21/2022        REASON FOR VISIT:  Follow-up for colon cancer surveillance      HISTORY OF PRESENT ILLNESS:   57 yr old female diagnosed with Stage II colon cancer in 2009, T3N0M0. She completed 6 months of Xeloda chemotherapy in the adjuvant setting.She is being followed yearly. She denies any changes with her bowels, denies any blood in stools.   She just had colonoscopy in 12/7/2022 that was unremarkable for any pathology; next colonoscopy recommended in 2027.      Past Medical History, Past Surgical History, Social History, Family History have been reviewed and are without significant changes except as mentioned.    Review of Systems   A comprehensive 14 point review of systems was performed and was negative except as mentioned.    Medications:  The current medication list was reviewed in the EMR    ALLERGIES:    Allergies   Allergen Reactions   • Tums [Calcium Carbonate Antacid] Anaphylaxis   • Sulfa Antibiotics Rash       Objective      Vitals:    12/21/22 1401   BP: (!) 161/104   Pulse: 92   Resp: 18   Temp: 97.2 °F (36.2 °C)   SpO2: 93%   Weight: 83.1 kg (183 lb 3.2 oz)   PainSc: 0-No pain     Current Status 12/18/2019   ECOG score 0       Physical Exam  General:alert and oriented no acute distress  Lungs;normal breath sounds  Card: RRR  Ext: no edema    RECENT LABS:  Glucose   Date Value Ref Range Status   12/21/2022 107 (H) 65 - 99 mg/dL Final   12/21/2022 106 (H) 65 - 99 mg/dL Final     Sodium   Date Value Ref Range Status   12/21/2022 142 136 - 145 mmol/L Final   12/21/2022 142 136 - 145 mmol/L Final     Potassium   Date Value Ref Range Status   12/21/2022 4.0 3.5 - 5.2 mmol/L Final   12/21/2022 4.0 3.5 - 5.2 mmol/L Final     CO2   Date Value Ref Range Status   12/21/2022 23.0 22.0 - 29.0 mmol/L Final   12/21/2022 20.0 (L) 22.0 - 29.0 mmol/L Final     Chloride   Date Value Ref Range Status   12/21/2022 109 (H) 98 - 107 mmol/L Final   12/21/2022 109 (H) 98 - 107 mmol/L Final     Anion Gap    Date Value Ref Range Status   12/21/2022 10.0 5.0 - 15.0 mmol/L Final   12/21/2022 13.0 5.0 - 15.0 mmol/L Final     Creatinine   Date Value Ref Range Status   12/21/2022 0.98 0.57 - 1.00 mg/dL Final   12/21/2022 0.98 0.57 - 1.00 mg/dL Final     BUN   Date Value Ref Range Status   12/21/2022 17 6 - 20 mg/dL Final   12/21/2022 17 6 - 20 mg/dL Final     BUN/Creatinine Ratio   Date Value Ref Range Status   12/21/2022 17.3 7.0 - 25.0 Final   12/21/2022 17.3 7.0 - 25.0 Final     Calcium   Date Value Ref Range Status   12/21/2022 10.1 8.6 - 10.5 mg/dL Final   12/21/2022 10.1 8.6 - 10.5 mg/dL Final     eGFR Non  Amer   Date Value Ref Range Status   12/16/2021 61 >60 mL/min/1.73 Final     Alkaline Phosphatase   Date Value Ref Range Status   12/21/2022 116 39 - 117 U/L Final     Total Protein   Date Value Ref Range Status   12/21/2022 7.2 6.0 - 8.5 g/dL Final     ALT (SGPT)   Date Value Ref Range Status   12/21/2022 25 1 - 33 U/L Final     AST (SGOT)   Date Value Ref Range Status   12/21/2022 19 1 - 32 U/L Final     Total Bilirubin   Date Value Ref Range Status   12/21/2022 0.3 0.0 - 1.2 mg/dL Final     Albumin   Date Value Ref Range Status   12/21/2022 4.20 3.50 - 5.20 g/dL Final   12/21/2022 4.20 3.50 - 5.20 g/dL Final     Globulin   Date Value Ref Range Status   12/21/2022 3.0 gm/dL Final     Lab Results   Component Value Date    WBC 9.09 12/21/2022    HGB 15.1 12/21/2022    HCT 44.8 12/21/2022    MCV 87.7 12/21/2022     12/21/2022     Lab Results   Component Value Date    NEUTROABS 6.58 12/21/2022     Lab Results   Component Value Date    CEA 1.52 12/21/2022         PATHOLOGY:  * Cannot find OR log *         RADIOLOGY DATA :  Mammo Screening Digital Tomosynthesis Bilateral With CAD    Result Date: 12/19/2022  No mammographic evidence of malignancy.  In the absence of suspicious clinical or physical findings, routine follow-up mammography is recommended in one year. BIRADS Category 2: Benign findings  Electronically signed by:  Umberto Rajan MD  12/19/2022 9:34 AM CST Workstation: [id number]           Assessment & Plan     Stage II colon cancer, diagnosed in 2009, status post surgery and 6 months adjuvant Xeloda chemotherapy.   -currently on surveillance; recent colonoscopy from 12/2022 was unremarkable.  -labs reviewed today and unremarkable; RTC one year with CBC, CMP and CEA.             PHQ-9 Total Score: 0     Pati Sotelo reports a pain score of 0.  Given her pain assessment as noted, treatment options were discussed and the following options were decided upon as a follow-up plan to address the patient's pain: no pain today.         YONY Vazquez  12/22/2022  08:50 CST        Part of this note may be an electronic transcription/translation of spoken language to printed text using the Dragon Dictation System.          CC:

## 2022-12-27 ENCOUNTER — TRANSCRIBE ORDERS (OUTPATIENT)
Dept: LAB | Facility: HOSPITAL | Age: 57
End: 2022-12-27

## 2022-12-27 DIAGNOSIS — R94.4 ABNORMAL RESULTS OF KIDNEY FUNCTION STUDIES: Primary | ICD-10-CM

## 2022-12-29 ENCOUNTER — HOSPITAL ENCOUNTER (OUTPATIENT)
Dept: PHYSICAL THERAPY | Facility: HOSPITAL | Age: 57
Setting detail: THERAPIES SERIES
Discharge: HOME | End: 2022-12-29

## 2022-12-29 DIAGNOSIS — M79.651 ACUTE PAIN OF RIGHT THIGH: ICD-10-CM

## 2022-12-29 DIAGNOSIS — R26.2 DIFFICULTY WALKING: ICD-10-CM

## 2022-12-29 DIAGNOSIS — M79.604 RIGHT LEG PAIN: Primary | ICD-10-CM

## 2022-12-29 PROCEDURE — 97110 THERAPEUTIC EXERCISES: CPT

## 2022-12-29 NOTE — THERAPY DISCHARGE NOTE
Outpatient Physical Therapy Ortho Treatment Note/Discharge Summary  [facility]     Patient Name: Pati Sotelo  : 1965  MRN: [id number]  Today's Date: 2022      Visit Date: 2022     Subjective Improvement 75%  Visits   Visits approved 90  RTMD neurologist 2023  Recert Date 2023    R Hamstring pain    Visit Dx:    ICD-10-CM ICD-9-CM   1. Right leg pain  M79.604 729.5   2. Acute pain of right thigh  M79.651 729.5   3. Difficulty walking  R26.2 719.7       Patient Active Problem List   Diagnosis   • Peter-Parkinson-White (WPW) pattern   • Essential hypertension   • Depression   • Class 1 obesity due to excess calories with serious comorbidity and body mass index (BMI) of 32.0 to 32.9 in adult   • Personal history of colon cancer   • Bilateral primary osteoarthritis of knee   • Internal derangement of left knee   • Internal derangement of right knee   • Chronic pain of both knees   • Degenerative tear of lateral meniscus of right knee   • Degenerative tear of lateral meniscus of left knee   • Effusion of right knee   • Effusion of left knee   • Chondromalacia of left knee   • Chondromalacia of right knee        Past Medical History:   Diagnosis Date   • Abdominal pain     lower, hypo gastrium, s/p surgery      • Anomalous atrioventricular excitation    • Arthritis    • C. difficile colitis     Hx   • Colon cancer (HCC)    • Conduction disorder of the heart      Supraventricular      • Depression    • Drug therapy    • Encounter for screening for malignant neoplasm of colon    • Essential hypertension    • Family history of malignant neoplasm of digestive organ    • History of colon polyps    • History of malignant neoplasm of colon     s/p resection in 2009 and chemo   • History of malignant neoplasm of gastrointestinal tract    • History of transfusion    • Hyperaldosteronism (HCC)    • Hyperplastic polyp of large intestine    • Hypokalemia    • Intramural leiomyoma of  uterus    • 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     Folate and B12 ordered      • Menstruation disorder     abnormal bleeding from female genital tract      • MRSA infection     Hx   • Screening for malignant neoplasm of breast    • Vitamin D deficiency    • Peter-Parkinson-White (WPW) pattern         Past Surgical History:   Procedure Laterality Date   • CARDIAC ABLATION     • CARDIAC CATHETERIZATION  07/02/2007    No significant coronary artery disease with normal left ventricular function   • COLECTOMY PARTIAL / TOTAL  12/23/2009    Mid sigmoid obstructing colon cancer   • COLONOSCOPY N/A 06/29/2021    Procedure: COLONOSCOPY;  Surgeon: Jorge A Sheehan MD;  Location: [facility] ENDOSCOPY;  Service: Gastroenterology;  Laterality: N/A;   • COLONOSCOPY N/A 8/17/2022    Procedure: COLONOSCOPY;  Surgeon: Jorge A Sheehan MD;  Location: [facility] ENDOSCOPY;  Service: Gastroenterology;  Laterality: N/A;   • COLONOSCOPY N/A 12/7/2022    Procedure: COLONOSCOPY;  Surgeon: Jorge A Sheehan MD;  Location: [facility] ENDOSCOPY;  Service: Gastroenterology;  Laterality: N/A;   • ECG CONVERTED  09/29/2008    Sinus rhythm with short TN. Minimal voltage criteria for LVH, may be normal variant T wave abnormality, consider inferolateral ischemia. Prolonged QT Abnormal ECG   • ENDOSCOPY AND COLONOSCOPY  06/27/2016    External and internal hemorrhoids. The examination was otherwise normal. No specimens collected   • HERNIA REPAIR     • TUBAL ABDOMINAL LIGATION     • UPPER GASTROINTESTINAL ENDOSCOPY  12/23/2009        PT Ortho     Row Name 12/29/22 1400       Subjective Comments    Subjective Comments Patient rpeorts pain in right knee and leg.  She requested to be D/C from therapy secondary to her therapy co-pay.  -CP       Precautions and Contraindications    Precautions h/o colon cancer (2009)  -CP       Subjective Pain    Able to rate subjective pain? yes  -CP    Pre-Treatment Pain Level 3  -CP    Post-Treatment Pain Level 3  -CP        "Posture/Observations    Posture/Observations Comments ataxic gait; using SPC  -CP          User Key  (r) = Recorded By, (t) = Taken By, (c) = Cosigned By    Initials Name Provider Type    Jessica Parrish PTA Physical Therapist Assistant                             PT Assessment/Plan     Row Name 12/29/22 1432          PT Assessment    Assessment Comments patient amb with ataxia gait.  She has met one goal so far.  Patient does request to be D/C secondary to her higher co-pay  -CP        PT Plan    PT Plan Comments D/C with HEP  -CP           User Key  (r) = Recorded By, (t) = Taken By, (c) = Cosigned By    Initials Name Provider Type    Jessica Parrish PTA Physical Therapist Assistant                     OP Exercises     Row Name 12/29/22 1438 12/29/22 1400          Subjective Comments    Subjective Comments -- Patient rpeorts pain in right knee and leg.  She requested to be D/C from therapy secondary to her therapy co-pay.  -CP        Subjective Pain    Able to rate subjective pain? -- yes  -CP     Pre-Treatment Pain Level -- 3  -CP     Post-Treatment Pain Level -- 3  -CP        Total Minutes    31753 - PT Therapeutic Exercise Minutes 40  -CP --        Exercise 1    Exercise Name 1 -- Pro II level 2  -CP     Time 1 -- 10  -CP        Exercise 2    Exercise Name 2 -- CR/TR  -CP     Cueing 2 -- Verbal  -CP     Sets 2 -- 2  -CP     Reps 2 -- 10  -CP        Exercise 3    Exercise Name 3 -- standing hip fl and AB  -CP     Cueing 3 -- Verbal;Demo  -CP     Sets 3 -- 2  -CP     Reps 3 -- 10 each  -CP     Time 3 -- B LE  -CP     Additional Comments -- handrail assist  -CP        Exercise 4    Exercise Name 4 -- LAQ  -CP     Cueing 4 -- Verbal;Tactile  -CP     Sets 4 -- 2  -CP     Reps 4 -- 10  -CP     Time 4 -- 5\" holds  -CP        Exercise 5    Exercise Name 5 -- bridging  -CP     Cueing 5 -- Verbal;Tactile  -CP     Sets 5 -- 2  -CP     Reps 5 -- 10  -CP     Time 5 -- 5\" holds  -CP        Exercise 6    Exercise " Name 6 -- clamshells  -CP     Cueing 6 -- Verbal;Tactile  -CP     Sets 6 -- 2  -CP     Reps 6 -- 10  -CP        Exercise 7    Exercise Name 7 -- reverse clamshells  -CP     Cueing 7 -- Verbal;Tactile  -CP     Sets 7 -- 2  -CP     Reps 7 -- 10  -CP        Exercise 8    Exercise Name 8 -- review HEP  -CP           User Key  (r) = Recorded By, (t) = Taken By, (c) = Cosigned By    Initials Name Provider Type    CP Jessica Joseph, PTA Physical Therapist Assistant                                PT OP Goals     Row Name 12/29/22 1400          PT Short Term Goals    STG Date to Achieve 12/27/22  -CP     STG 1 Improve R hip flex and R knee flex/ext MMT to 5/5  -CP     STG 1 Progress Ongoing;Progressing  -CP     STG 2 Improve R hamstrings flexibility 5-10° with 90/90 SLR test position  -CP     STG 2 Progress Not Met  -CP     STG 3 Reduce R knee pain by 25-50% with standing and walking  -CP     STG 3 Progress Met  80% improvement  -CP     STG 4 mild to no antalgic gait pattern with R LE with use of least AD x 135' in the clinic  -CP     STG 4 Progress Ongoing  -CP     STG 5 Improve B hip abduction/adduction MMT to 4+/5  -CP     STG 5 Progress New  -CP        Time Calculation    PT Goal Re-Cert Due Date 01/03/23  -CP           User Key  (r) = Recorded By, (t) = Taken By, (c) = Cosigned By    Initials Name Provider Type    Jessica Parrish PTA Physical Therapist Assistant                Therapy Education  Education Details: CR/Tr, standing hip AB and Fl B LE, LAQ, bridging, clamshell and reverse clamshells  Given: HEP  Program: New  How Provided: Verbal, Demonstration, Written  Provided to: Patient  Level of Understanding: Teach back education performed, Verbalized, Demonstrated              Time Calculation:   Start Time: 1350  Stop Time: 1430  Time Calculation (min): 40 min  Total Timed Code Minutes- PT: 40 minute(s)  Timed Charges  48774 - PT Therapeutic Exercise Minutes: 40  Total Minutes  Timed Charges Total  Minutes: 40   Total Minutes: 40  Therapy Charges for Today     Code Description Service Date Service Provider Modifiers Qty    [id number] HC PT THER PROC EA 15 MIN 12/29/2022 Jessica Joseph PTA GP, CQ 3                OP PT Discharge Summary  Date of Discharge: 12/29/22  Reason for Discharge: Patient/Caregiver request  Outcomes Achieved: Patient able to partially acheive established goals, Unable to make functional progress toward goals at this time  Discharge Destination: Home with home program      Jessica Joseph PTA  12/29/2022

## 2023-03-03 DIAGNOSIS — I10 ESSENTIAL HYPERTENSION: ICD-10-CM

## 2023-03-03 RX ORDER — METOPROLOL TARTRATE 50 MG/1
50 TABLET, FILM COATED ORAL 2 TIMES DAILY
Qty: 180 TABLET | Refills: 3 | Status: SHIPPED | OUTPATIENT
Start: 2023-03-03

## 2023-03-03 RX ORDER — AMITRIPTYLINE HYDROCHLORIDE 50 MG/1
50 TABLET, FILM COATED ORAL NIGHTLY
Qty: 90 TABLET | Refills: 3 | Status: SHIPPED | OUTPATIENT
Start: 2023-03-03

## 2023-03-27 ENCOUNTER — LAB (OUTPATIENT)
Dept: LAB | Facility: HOSPITAL | Age: 58
End: 2023-03-27
Payer: MEDICAID

## 2023-03-27 ENCOUNTER — TRANSCRIBE ORDERS (OUTPATIENT)
Dept: LAB | Facility: HOSPITAL | Age: 58
End: 2023-03-27
Payer: MEDICAID

## 2023-03-27 DIAGNOSIS — G62.89 OTHER POLYNEUROPATHY: Primary | ICD-10-CM

## 2023-03-27 DIAGNOSIS — G62.89 OTHER POLYNEUROPATHY: ICD-10-CM

## 2023-03-27 DIAGNOSIS — R26.89 LOSS OF BALANCE: ICD-10-CM

## 2023-03-27 PROCEDURE — 36415 COLL VENOUS BLD VENIPUNCTURE: CPT

## 2023-03-27 PROCEDURE — 86618 LYME DISEASE ANTIBODY: CPT

## 2023-03-27 PROCEDURE — 82607 VITAMIN B-12: CPT

## 2023-03-27 PROCEDURE — 84425 ASSAY OF VITAMIN B-1: CPT

## 2023-03-27 PROCEDURE — 84443 ASSAY THYROID STIM HORMONE: CPT | Performed by: PSYCHIATRY & NEUROLOGY

## 2023-03-27 PROCEDURE — 82746 ASSAY OF FOLIC ACID SERUM: CPT

## 2023-03-27 PROCEDURE — 84155 ASSAY OF PROTEIN SERUM: CPT

## 2023-03-27 PROCEDURE — 82784 ASSAY IGA/IGD/IGG/IGM EACH: CPT

## 2023-03-27 PROCEDURE — 84165 PROTEIN E-PHORESIS SERUM: CPT

## 2023-03-27 PROCEDURE — 86334 IMMUNOFIX E-PHORESIS SERUM: CPT

## 2023-03-28 LAB
B BURGDOR AB SER-IMP: NORMAL
B BURGDOR IGG SERPL QL IA: NEGATIVE
B BURGDOR IGG+IGM SER QL IA: POSITIVE
B BURGDOR IGM SERPL QL IA: NEGATIVE
FOLATE SERPL-MCNC: 8.99 NG/ML (ref 4.78–24.2)
TSH SERPL DL<=0.05 MIU/L-ACNC: 1.2 UIU/ML (ref 0.27–4.2)
VIT B12 BLD-MCNC: 317 PG/ML (ref 211–946)

## 2023-03-29 LAB
ALBUMIN SERPL ELPH-MCNC: 3.9 G/DL (ref 2.9–4.4)
ALBUMIN/GLOB SERPL: 1.4 {RATIO} (ref 0.7–1.7)
ALPHA1 GLOB SERPL ELPH-MCNC: 0.2 G/DL (ref 0–0.4)
ALPHA2 GLOB SERPL ELPH-MCNC: 0.8 G/DL (ref 0.4–1)
B-GLOBULIN SERPL ELPH-MCNC: 1.2 G/DL (ref 0.7–1.3)
GAMMA GLOB SERPL ELPH-MCNC: 0.8 G/DL (ref 0.4–1.8)
GLOBULIN SER-MCNC: 3 G/DL (ref 2.2–3.9)
IGA SERPL-MCNC: 190 MG/DL (ref 87–352)
IGG SERPL-MCNC: 754 MG/DL (ref 586–1602)
IGM SERPL-MCNC: 48 MG/DL (ref 26–217)
INTERPRETATION SERPL IEP-IMP: NORMAL
LABORATORY COMMENT REPORT: NORMAL
M PROTEIN SERPL ELPH-MCNC: NORMAL G/DL
PROT SERPL-MCNC: 6.9 G/DL (ref 6–8.5)

## 2023-03-30 DIAGNOSIS — I10 ESSENTIAL HYPERTENSION: Chronic | ICD-10-CM

## 2023-03-30 LAB — VIT B1 BLD-SCNC: 150.9 NMOL/L (ref 66.5–200)

## 2023-03-30 RX ORDER — AMLODIPINE BESYLATE 10 MG/1
10 TABLET ORAL DAILY
Qty: 90 TABLET | Refills: 3 | Status: SHIPPED | OUTPATIENT
Start: 2023-03-30

## 2023-05-05 ENCOUNTER — DOCUMENTATION (OUTPATIENT)
Dept: FAMILY MEDICINE CLINIC | Facility: CLINIC | Age: 58
End: 2023-05-05
Payer: MEDICAID

## 2023-05-05 NOTE — PROGRESS NOTES
Pt was called to inform her that she will need an appointment to be seen for disability.Patient agreed to do so.

## 2023-05-12 ENCOUNTER — LAB (OUTPATIENT)
Dept: LAB | Facility: HOSPITAL | Age: 58
End: 2023-05-12
Payer: MEDICAID

## 2023-05-12 ENCOUNTER — OFFICE VISIT (OUTPATIENT)
Dept: FAMILY MEDICINE CLINIC | Facility: CLINIC | Age: 58
End: 2023-05-12
Payer: MEDICAID

## 2023-05-12 VITALS
WEIGHT: 195.9 LBS | SYSTOLIC BLOOD PRESSURE: 138 MMHG | HEIGHT: 63 IN | HEART RATE: 76 BPM | DIASTOLIC BLOOD PRESSURE: 88 MMHG | OXYGEN SATURATION: 98 % | BODY MASS INDEX: 34.71 KG/M2

## 2023-05-12 DIAGNOSIS — M25.562 CHRONIC PAIN OF BOTH KNEES: Chronic | ICD-10-CM

## 2023-05-12 DIAGNOSIS — F32.4 MAJOR DEPRESSIVE DISORDER WITH SINGLE EPISODE, IN PARTIAL REMISSION: Chronic | ICD-10-CM

## 2023-05-12 DIAGNOSIS — M23.92 INTERNAL DERANGEMENT OF LEFT KNEE: Chronic | ICD-10-CM

## 2023-05-12 DIAGNOSIS — Z02.9 ADMINISTRATIVE ENCOUNTER: ICD-10-CM

## 2023-05-12 DIAGNOSIS — I45.6 WOLFF-PARKINSON-WHITE (WPW) PATTERN: Chronic | ICD-10-CM

## 2023-05-12 DIAGNOSIS — M25.561 CHRONIC PAIN OF BOTH KNEES: Chronic | ICD-10-CM

## 2023-05-12 DIAGNOSIS — Z13.220 SCREENING CHOLESTEROL LEVEL: ICD-10-CM

## 2023-05-12 DIAGNOSIS — I10 ESSENTIAL HYPERTENSION: Primary | Chronic | ICD-10-CM

## 2023-05-12 DIAGNOSIS — E66.09 CLASS 1 OBESITY DUE TO EXCESS CALORIES WITH SERIOUS COMORBIDITY AND BODY MASS INDEX (BMI) OF 34.0 TO 34.9 IN ADULT: ICD-10-CM

## 2023-05-12 DIAGNOSIS — G89.29 CHRONIC PAIN OF BOTH KNEES: Chronic | ICD-10-CM

## 2023-05-12 DIAGNOSIS — M23.91 INTERNAL DERANGEMENT OF RIGHT KNEE: Chronic | ICD-10-CM

## 2023-05-12 DIAGNOSIS — G60.9 IDIOPATHIC NEUROPATHY: ICD-10-CM

## 2023-05-12 LAB
ALBUMIN SERPL-MCNC: 4.5 G/DL (ref 3.5–5.2)
ALBUMIN/GLOB SERPL: 1.5 G/DL
ALP SERPL-CCNC: 121 U/L (ref 39–117)
ALT SERPL W P-5'-P-CCNC: 33 U/L (ref 1–33)
ANION GAP SERPL CALCULATED.3IONS-SCNC: 11 MMOL/L (ref 5–15)
AST SERPL-CCNC: 24 U/L (ref 1–32)
BILIRUB SERPL-MCNC: 0.4 MG/DL (ref 0–1.2)
BUN SERPL-MCNC: 12 MG/DL (ref 6–20)
BUN/CREAT SERPL: 12.9 (ref 7–25)
CALCIUM SPEC-SCNC: 10.3 MG/DL (ref 8.6–10.5)
CHLORIDE SERPL-SCNC: 104 MMOL/L (ref 98–107)
CO2 SERPL-SCNC: 24 MMOL/L (ref 22–29)
CREAT SERPL-MCNC: 0.93 MG/DL (ref 0.57–1)
EGFRCR SERPLBLD CKD-EPI 2021: 71.4 ML/MIN/1.73
GLOBULIN UR ELPH-MCNC: 3 GM/DL
GLUCOSE SERPL-MCNC: 95 MG/DL (ref 65–99)
POTASSIUM SERPL-SCNC: 3.8 MMOL/L (ref 3.5–5.2)
PROT SERPL-MCNC: 7.5 G/DL (ref 6–8.5)
SODIUM SERPL-SCNC: 139 MMOL/L (ref 136–145)

## 2023-05-12 PROCEDURE — 85027 COMPLETE CBC AUTOMATED: CPT | Performed by: FAMILY MEDICINE

## 2023-05-12 PROCEDURE — 36415 COLL VENOUS BLD VENIPUNCTURE: CPT

## 2023-05-12 PROCEDURE — 83036 HEMOGLOBIN GLYCOSYLATED A1C: CPT | Performed by: FAMILY MEDICINE

## 2023-05-12 PROCEDURE — 80053 COMPREHEN METABOLIC PANEL: CPT | Performed by: FAMILY MEDICINE

## 2023-05-12 PROCEDURE — 83735 ASSAY OF MAGNESIUM: CPT | Performed by: FAMILY MEDICINE

## 2023-05-12 PROCEDURE — 80061 LIPID PANEL: CPT

## 2023-05-12 RX ORDER — LISINOPRIL 40 MG/1
40 TABLET ORAL DAILY
Qty: 90 TABLET | Refills: 3 | Status: SHIPPED | OUTPATIENT
Start: 2023-05-12

## 2023-05-12 RX ORDER — SPIRONOLACTONE 25 MG/1
25 TABLET ORAL DAILY
Qty: 90 TABLET | Refills: 3 | Status: SHIPPED | OUTPATIENT
Start: 2023-05-12

## 2023-05-12 NOTE — PROGRESS NOTES
Subjective   Pati Sotelo is a 57 y.o. female.  Reevaluation.  Patient currently still not able to work.  Continues to complain of bilateral knee pain chondromalacia and peripheral neuropathy symptoms.  Apparently was due to visit with neurologist but neurologist is left contrary to September is going need a new set up for nerve conduction studies etc.  Chemically has shown no evidence of any neuropathy.  Also has seen orthopedics as mentioned.  We will need to disability forms refilled out today.  Is also due for lab work for her diet hypertension.  Is also gained weight from inactivity.  Chart reviewed.    History of Present Illness   HPI    The following portions of the patient's history were reviewed and updated as appropriate: allergies, current medications, past family history, past medical history, past social history, past surgical history and problem list.    Review of Systems  Review of Systems   Constitutional: Negative for activity change, appetite change, fatigue and unexpected weight change.   HENT: Negative for trouble swallowing and voice change.    Eyes: Negative for redness and visual disturbance.   Respiratory: Negative for cough and wheezing.    Cardiovascular: Negative for chest pain and palpitations.   Gastrointestinal: Negative for abdominal pain, constipation, diarrhea, nausea and vomiting.   Genitourinary: Negative for urgency.   Musculoskeletal: Positive for arthralgias and gait problem. Negative for joint swelling.   Neurological: Positive for weakness. Negative for syncope and headaches.   Hematological: Negative for adenopathy.   Psychiatric/Behavioral: Negative for sleep disturbance.       Objective   Physical Exam  Physical Exam  Constitutional:       Appearance: She is well-developed.   HENT:      Head: Normocephalic.   Eyes:      Pupils: Pupils are equal, round, and reactive to light.   Neck:      Thyroid: No thyromegaly.   Cardiovascular:      Rate and Rhythm: Normal rate and  "regular rhythm.      Heart sounds: Normal heart sounds. No murmur heard.    No friction rub. No gallop.   Pulmonary:      Breath sounds: Normal breath sounds.   Abdominal:      General: There is no distension.      Palpations: Abdomen is soft. There is no mass.      Tenderness: There is no abdominal tenderness.   Musculoskeletal:         General: Normal range of motion.      Cervical back: Normal range of motion.      Comments: Mild proximal mid and distal muscle wasting of the lower extremities.  Crepitance to both knees with flexion extension.  Get up and go 3 to 5 seconds with cane.  Gait slightly antalgic   Skin:     General: Skin is warm and dry.   Neurological:      Mental Status: She is alert and oriented to person, place, and time.      Deep Tendon Reflexes: Reflexes are normal and symmetric.   Psychiatric:         Attention and Perception: Attention normal.         Mood and Affect: Mood normal.         Speech: Speech normal.         Behavior: Behavior normal.         Thought Content: Thought content normal.         Cognition and Memory: Cognition normal.           Visit Vitals  /88   Pulse 76   Ht 160 cm (63\")   Wt 88.9 kg (195 lb 14.4 oz)   LMP 09/08/2017   SpO2 98%   BMI 34.70 kg/m²     Body mass index is 34.7 kg/m².    /88   Pulse 76   Ht 160 cm (63\")   Wt 88.9 kg (195 lb 14.4 oz)   LMP 09/08/2017   SpO2 98%   BMI 34.70 kg/m²     Assessment/Plan   Diagnoses and all orders for this visit:    1. Essential hypertension (Primary)  -     Cancel: Comprehensive Metabolic Panel  -     Magnesium  -     spironolactone (ALDACTONE) 25 MG tablet; Take 1 tablet by mouth Daily.  Dispense: 90 tablet; Refill: 3  -     lisinopril (PRINIVIL,ZESTRIL) 40 MG tablet; Take 1 tablet by mouth Daily.  Dispense: 90 tablet; Refill: 3  -     Comprehensive Metabolic Panel    2. Class 1 obesity due to excess calories with serious comorbidity and body mass index (BMI) of 34.0 to 34.9 in adult  -     Hemoglobin " A1c    3. Major depressive disorder with single episode, in partial remission    4. Internal derangement of right knee    5. Internal derangement of left knee    6. Chronic pain of both knees    7. Idiopathic neuropathy  -     CBC (No Diff)  -     Ambulatory Referral to Neurology    8. Peter-Parkinson-White (WPW) pattern    9. Screening cholesterol level  -     Lipid Panel With LDL / HDL Ratio    10. Administrative encounter     on wt loss measures and programs  Counseled strongly on lifestyle measures diet and exercise hydration etc.  Medications updated.  Continues on medicine to help with WPW keep potassium up.  Recheck 6 months.  Extra 30 minutes to an hour spent after visit working on paperwork filling out forms etc.

## 2023-05-13 LAB
CHOLEST SERPL-MCNC: 217 MG/DL (ref 0–200)
DEPRECATED RDW RBC AUTO: 39 FL (ref 37–54)
ERYTHROCYTE [DISTWIDTH] IN BLOOD BY AUTOMATED COUNT: 12.9 % (ref 12.3–15.4)
HBA1C MFR BLD: 5.5 % (ref 4.8–5.6)
HCT VFR BLD AUTO: 46.4 % (ref 34–46.6)
HDLC SERPL-MCNC: 51 MG/DL (ref 40–60)
HGB BLD-MCNC: 15.9 G/DL (ref 12–15.9)
LDLC SERPL CALC-MCNC: 136 MG/DL (ref 0–100)
LDLC/HDLC SERPL: 2.59 {RATIO}
MAGNESIUM SERPL-MCNC: 2.4 MG/DL (ref 1.6–2.6)
MCH RBC QN AUTO: 29.5 PG (ref 26.6–33)
MCHC RBC AUTO-ENTMCNC: 34.3 G/DL (ref 31.5–35.7)
MCV RBC AUTO: 86.1 FL (ref 79–97)
PLATELET # BLD AUTO: 349 10*3/MM3 (ref 140–450)
PMV BLD AUTO: 11 FL (ref 6–12)
RBC # BLD AUTO: 5.39 10*6/MM3 (ref 3.77–5.28)
TRIGL SERPL-MCNC: 169 MG/DL (ref 0–150)
VLDLC SERPL-MCNC: 30 MG/DL (ref 5–40)
WBC NRBC COR # BLD: 7.17 10*3/MM3 (ref 3.4–10.8)

## 2023-05-14 NOTE — PROGRESS NOTES
Repeat chol still high.  Ldl 136.  Should be 110.  Candidate for medication.  If wish to start, let us know

## 2023-05-15 DIAGNOSIS — E78.2 MIXED HYPERLIPIDEMIA: Primary | ICD-10-CM

## 2023-05-15 RX ORDER — ATORVASTATIN CALCIUM 10 MG/1
10 TABLET, FILM COATED ORAL DAILY
Qty: 90 TABLET | Refills: 3 | Status: SHIPPED | OUTPATIENT
Start: 2023-05-15

## 2023-06-06 DIAGNOSIS — I10 ESSENTIAL HYPERTENSION: Chronic | ICD-10-CM

## 2023-06-06 RX ORDER — POTASSIUM CHLORIDE 20 MEQ/1
20 TABLET, EXTENDED RELEASE ORAL 2 TIMES DAILY
Qty: 180 TABLET | Refills: 3 | Status: SHIPPED | OUTPATIENT
Start: 2023-06-06

## (undated) DEVICE — CANN SMPL SOFTECH BIFLO ETCO2 A/M 7FT

## (undated) DEVICE — TRAP SXN POLYP QUICKCATCH LF

## (undated) DEVICE — Device: Brand: DISPOSABLE ELECTROSURGICAL SNARE

## (undated) DEVICE — PATIENT RETURN ELECTRODE, SINGLE-USE, CONTACT QUALITY MONITORING, ADULT, WITH 9FT CORD, FOR PATIENTS WEIGING OVER 33LBS. (15KG): Brand: MEGADYNE